# Patient Record
Sex: MALE | Race: WHITE | NOT HISPANIC OR LATINO | Employment: UNEMPLOYED | URBAN - METROPOLITAN AREA
[De-identification: names, ages, dates, MRNs, and addresses within clinical notes are randomized per-mention and may not be internally consistent; named-entity substitution may affect disease eponyms.]

---

## 2017-08-02 ENCOUNTER — OFFICE VISIT (OUTPATIENT)
Dept: RETAIL CLINIC | Facility: CLINIC | Age: 27
End: 2017-08-02

## 2017-08-02 DIAGNOSIS — Z02.83 ENCOUNTER FOR DRUG SCREENING: Primary | ICD-10-CM

## 2021-07-21 ENCOUNTER — HOSPITAL ENCOUNTER (EMERGENCY)
Facility: HOSPITAL | Age: 31
Discharge: HOME | End: 2021-07-21
Attending: EMERGENCY MEDICINE | Admitting: EMERGENCY MEDICINE

## 2021-07-21 ENCOUNTER — APPOINTMENT (OUTPATIENT)
Dept: GENERAL RADIOLOGY | Facility: HOSPITAL | Age: 31
End: 2021-07-21

## 2021-07-21 VITALS
TEMPERATURE: 98.3 F | OXYGEN SATURATION: 95 % | DIASTOLIC BLOOD PRESSURE: 97 MMHG | BODY MASS INDEX: 39.17 KG/M2 | HEIGHT: 75 IN | WEIGHT: 315 LBS | SYSTOLIC BLOOD PRESSURE: 143 MMHG | HEART RATE: 70 BPM | RESPIRATION RATE: 18 BRPM

## 2021-07-21 DIAGNOSIS — R07.9 CHEST PAIN, UNSPECIFIED TYPE: Primary | ICD-10-CM

## 2021-07-21 LAB
ALBUMIN SERPL-MCNC: 4.4 G/DL (ref 3.5–5.2)
ALBUMIN/GLOB SERPL: 1.7 G/DL
ALP SERPL-CCNC: 78 U/L (ref 39–117)
ALT SERPL W P-5'-P-CCNC: 51 U/L (ref 1–41)
ANION GAP SERPL CALCULATED.3IONS-SCNC: 10.7 MMOL/L (ref 5–15)
AST SERPL-CCNC: 25 U/L (ref 1–40)
BASOPHILS # BLD AUTO: 0.05 10*3/MM3 (ref 0–0.2)
BASOPHILS NFR BLD AUTO: 0.6 % (ref 0–1.5)
BILIRUB SERPL-MCNC: 0.3 MG/DL (ref 0–1.2)
BUN SERPL-MCNC: 9 MG/DL (ref 6–20)
BUN/CREAT SERPL: 11.8 (ref 7–25)
CALCIUM SPEC-SCNC: 9.7 MG/DL (ref 8.6–10.5)
CHLORIDE SERPL-SCNC: 103 MMOL/L (ref 98–107)
CO2 SERPL-SCNC: 26.3 MMOL/L (ref 22–29)
CREAT SERPL-MCNC: 0.76 MG/DL (ref 0.76–1.27)
DEPRECATED RDW RBC AUTO: 38.1 FL (ref 37–54)
EOSINOPHIL # BLD AUTO: 0.19 10*3/MM3 (ref 0–0.4)
EOSINOPHIL NFR BLD AUTO: 2.3 % (ref 0.3–6.2)
ERYTHROCYTE [DISTWIDTH] IN BLOOD BY AUTOMATED COUNT: 11.9 % (ref 12.3–15.4)
GFR SERPL CREATININE-BSD FRML MDRD: 120 ML/MIN/1.73
GLOBULIN UR ELPH-MCNC: 2.6 GM/DL
GLUCOSE SERPL-MCNC: 87 MG/DL (ref 65–99)
HCT VFR BLD AUTO: 50.7 % (ref 37.5–51)
HGB BLD-MCNC: 17.5 G/DL (ref 13–17.7)
HOLD SPECIMEN: NORMAL
HOLD SPECIMEN: NORMAL
IMM GRANULOCYTES # BLD AUTO: 0.02 10*3/MM3 (ref 0–0.05)
IMM GRANULOCYTES NFR BLD AUTO: 0.2 % (ref 0–0.5)
LYMPHOCYTES # BLD AUTO: 1.76 10*3/MM3 (ref 0.7–3.1)
LYMPHOCYTES NFR BLD AUTO: 21.5 % (ref 19.6–45.3)
MCH RBC QN AUTO: 30 PG (ref 26.6–33)
MCHC RBC AUTO-ENTMCNC: 34.5 G/DL (ref 31.5–35.7)
MCV RBC AUTO: 87 FL (ref 79–97)
MONOCYTES # BLD AUTO: 0.63 10*3/MM3 (ref 0.1–0.9)
MONOCYTES NFR BLD AUTO: 7.7 % (ref 5–12)
NEUTROPHILS NFR BLD AUTO: 5.54 10*3/MM3 (ref 1.7–7)
NEUTROPHILS NFR BLD AUTO: 67.7 % (ref 42.7–76)
NRBC BLD AUTO-RTO: 0 /100 WBC (ref 0–0.2)
PLATELET # BLD AUTO: 181 10*3/MM3 (ref 140–450)
PMV BLD AUTO: 10.6 FL (ref 6–12)
POTASSIUM SERPL-SCNC: 3.9 MMOL/L (ref 3.5–5.2)
PROT SERPL-MCNC: 7 G/DL (ref 6–8.5)
RBC # BLD AUTO: 5.83 10*6/MM3 (ref 4.14–5.8)
SODIUM SERPL-SCNC: 140 MMOL/L (ref 136–145)
TROPONIN T SERPL-MCNC: <0.01 NG/ML (ref 0–0.03)
WBC # BLD AUTO: 8.19 10*3/MM3 (ref 3.4–10.8)
WHOLE BLOOD HOLD SPECIMEN: NORMAL

## 2021-07-21 PROCEDURE — 99283 EMERGENCY DEPT VISIT LOW MDM: CPT

## 2021-07-21 PROCEDURE — 85025 COMPLETE CBC W/AUTO DIFF WBC: CPT | Performed by: EMERGENCY MEDICINE

## 2021-07-21 PROCEDURE — 93005 ELECTROCARDIOGRAM TRACING: CPT | Performed by: EMERGENCY MEDICINE

## 2021-07-21 PROCEDURE — 71045 X-RAY EXAM CHEST 1 VIEW: CPT

## 2021-07-21 PROCEDURE — 84484 ASSAY OF TROPONIN QUANT: CPT | Performed by: EMERGENCY MEDICINE

## 2021-07-21 PROCEDURE — 80053 COMPREHEN METABOLIC PANEL: CPT | Performed by: EMERGENCY MEDICINE

## 2021-07-21 RX ORDER — ASPIRIN 81 MG/1
81 TABLET ORAL DAILY
COMMUNITY

## 2021-07-21 RX ORDER — SODIUM CHLORIDE 0.9 % (FLUSH) 0.9 %
10 SYRINGE (ML) INJECTION AS NEEDED
Status: DISCONTINUED | OUTPATIENT
Start: 2021-07-21 | End: 2021-07-21 | Stop reason: HOSPADM

## 2021-07-21 RX ORDER — LISINOPRIL 5 MG/1
TABLET ORAL
COMMUNITY
End: 2021-12-13 | Stop reason: DRUGHIGH

## 2021-07-21 RX ORDER — SERTRALINE HYDROCHLORIDE 25 MG/1
TABLET, FILM COATED ORAL
COMMUNITY
End: 2021-08-13

## 2021-07-21 RX ORDER — ALBUTEROL SULFATE 90 UG/1
AEROSOL, METERED RESPIRATORY (INHALATION) EVERY 4 HOURS
COMMUNITY

## 2021-07-21 NOTE — ED PROVIDER NOTES
Subjective   31-year-old male presents to the ED with a chief complaint of chest pain.  Patient states that he has had 3 episodes of chest pain today.  One episode around 9 PM, one episode around 9:30 PM and one episode around 10:30 PM.  Dull ache throughout his entire chest.  The episodes last about 10 minutes.  The episodes are not associated with any other symptoms.  No shortness of breath cough or wheeze.  No nausea vomiting diarrhea or abdominal pain.  No lightheadedness or dizziness.  No diaphoresis.  No known history of coronary artery disease.  No other complaints at this time.          Review of Systems   Cardiovascular: Positive for chest pain.   All other systems reviewed and are negative.      Past Medical History:   Diagnosis Date   • Depression    • Hypertension        No Known Allergies    Past Surgical History:   Procedure Laterality Date   • DENTAL PROCEDURE     • TONSILLECTOMY         History reviewed. No pertinent family history.    Social History     Socioeconomic History   • Marital status: Unknown     Spouse name: Not on file   • Number of children: Not on file   • Years of education: Not on file   • Highest education level: Not on file   Tobacco Use   • Smoking status: Current Every Day Smoker     Packs/day: 1.00     Types: Cigarettes   • Smokeless tobacco: Never Used   Substance and Sexual Activity   • Alcohol use: Not Currently   • Drug use: Never   • Sexual activity: Defer           Objective   Physical Exam  Vitals and nursing note reviewed.   Constitutional:       General: He is not in acute distress.     Appearance: He is well-developed. He is not diaphoretic.   HENT:      Head: Normocephalic and atraumatic.      Nose: Nose normal.   Eyes:      Conjunctiva/sclera: Conjunctivae normal.      Pupils: Pupils are equal, round, and reactive to light.   Cardiovascular:      Rate and Rhythm: Normal rate and regular rhythm.   Pulmonary:      Effort: Pulmonary effort is normal. No respiratory  distress.      Breath sounds: Normal breath sounds.   Abdominal:      General: There is no distension.      Palpations: Abdomen is soft.      Tenderness: There is no abdominal tenderness.   Musculoskeletal:         General: No deformity.   Neurological:      Mental Status: He is alert and oriented to person, place, and time.      Cranial Nerves: No cranial nerve deficit.      Coordination: Coordination normal.         Procedures           ED Course  ED Course as of Jul 21 0112 Wed Jul 21, 2021   0038 EKG interpreted by me.  Sinus rhythm.  Rate of 78.  No ST segment or T wave changes.  Normal EKG    [CG]      ED Course User Index  [CG] Trevor Pitts,                                            [facility]  EKG nonischemic.  Chest x-ray negative for acute process.  Heart score of 2.  Patient is appropriate for discharge follow-up outpatient as needed.  He is agreeable to this plan.      Final diagnoses:   Chest pain, unspecified type       ED Disposition  ED Disposition     ED Disposition Condition Comment    Discharge Stable           Blake Candelaria MD  [address] [address] [phone]               Medication List      No changes were made to your prescriptions during this visit.          Trevor Pitts DO  07/21/21 0113

## 2021-08-06 NOTE — PROGRESS NOTES
Patient: Ozzie Le    YOB: 1990    Date: 08/13/2021    Primary Care Provider: Hussein Pierce APRN    Chief Complaint   Patient presents with   • Hernia       SUBJECTIVE:    History of present illness:  I saw the patient in the office today as a consultation for evaluation and treatment of periumbilical abdominal pain. Recent abdominal CT shows small umbilical hernia.  Hernia is asymptomatic and increasing in size.  Patient has pain when he is touched or have prolonged lifting or standing.  No change in bowel habits.    The following portions of the patient's history were reviewed and updated as appropriate: allergies, current medications, past family history, past medical history, past social history, past surgical history and problem list.    Review of Systems   Constitutional: Negative for chills, fever and unexpected weight change.   HENT: Negative for trouble swallowing and voice change.    Eyes: Negative for visual disturbance.   Respiratory: Negative for apnea, cough, chest tightness, shortness of breath and wheezing.    Cardiovascular: Negative for chest pain, palpitations and leg swelling.   Gastrointestinal: Positive for abdominal pain. Negative for abdominal distention, anal bleeding, blood in stool, constipation, diarrhea, nausea, rectal pain and vomiting.   Endocrine: Negative for cold intolerance and heat intolerance.   Genitourinary: Negative for difficulty urinating, dysuria, flank pain, scrotal swelling and testicular pain.   Musculoskeletal: Negative for back pain, gait problem and joint swelling.   Skin: Negative for color change, rash and wound.   Neurological: Negative for dizziness, syncope, speech difficulty, weakness, numbness and headaches.   Hematological: Negative for adenopathy. Does not bruise/bleed easily.   Psychiatric/Behavioral: Negative for confusion. The patient is not nervous/anxious.        History:  Past Medical History:   Diagnosis Date   • Depression   "  • Hypertension        Past Surgical History:   Procedure Laterality Date   • DENTAL PROCEDURE     • TONSILLECTOMY         No family history on file.    Social History     Tobacco Use   • Smoking status: Current Every Day Smoker     Packs/day: 1.00     Types: Cigarettes   • Smokeless tobacco: Never Used   Substance Use Topics   • Alcohol use: Not Currently   • Drug use: Never       Allergies:  No Known Allergies    Medications:    Current Outpatient Medications:   •  albuterol sulfate HFA (ProAir HFA) 108 (90 Base) MCG/ACT inhaler, Every 4 (Four) Hours., Disp: , Rfl:   •  aspirin 81 MG EC tablet, Take 81 mg by mouth Daily., Disp: , Rfl:   •  buPROPion XL (WELLBUTRIN XL) 150 MG 24 hr tablet, , Disp: , Rfl:   •  lisinopril (PRINIVIL,ZESTRIL) 5 MG tablet, lisinopril 5 mg tablet  Take 1 tablet every day by oral route., Disp: , Rfl:     OBJECTIVE:    Vital Signs:   Vitals:    08/13/21 1356   BP: 140/74   Pulse: 95   Temp: 97.3 °F (36.3 °C)   SpO2: 96%   Weight: (!) 149 kg (328 lb)   Height: 190.5 cm (75\")       Physical Exam:   General Appearance:    Alert, cooperative, in no acute distress   Head:    Normocephalic, without obvious abnormality, atraumatic   Eyes:            Lids and lashes normal, conjunctivae and sclerae normal, no   icterus, no pallor, corneas clear, PERRLA   Ears:    Ears appear intact with no abnormalities noted   Throat:   No oral lesions, no thrush, oral mucosa moist   Neck:   No adenopathy, supple, trachea midline, no thyromegaly, no   carotid bruit, no JVD   Lungs:     Clear to auscultation,respirations regular, even and                  unlabored    Heart:    Regular rhythm and normal rate, normal S1 and S2, no            murmur   Abdomen:     no masses, no organomegaly, soft non-tender, non-distended, no guarding, there is evidence of a 4 cm umbilical hernia that is nonreducible.  Tender to palpation.   Extremities:   Moves all extremities well, no edema, no cyanosis, no             redness "   Pulses:   Pulses palpable and equal bilaterally   Skin:   No bleeding, bruising or rash   Lymph nodes:   No palpable adenopathy   Neurologic:   Cranial nerves 2 - 12 grossly intact, sensation intact        Results Review:   I reviewed the patient's new clinical results.    Review of Systems was reviewed and confirmed as accurate as documented by the MA.    ASSESSMENT/PLAN:    1. Umbilical hernia without obstruction and without gangrene        I had a detailed and extensive discussion with the patient in the office and they understand that they need to undergo incarcerated umbilical hernia repair with mesh.  Full risks and benefits of operative versus nonoperative intervention were discussed with the patient and these included things such as nonresolution of symptoms and possible worsening of symptoms without surgical intervention versus infection, bleeding, possible recurrent hernia, possible postoperative neuralgia from nerve damage or involvement with scar tissue, etc.  The patient understands, agrees, and had no questions for me at the end of the office visit.     I discussed the patients findings and my recommendations with patient.    Electronically signed by Jackelyn Albert MD  08/13/21

## 2021-08-13 ENCOUNTER — OFFICE VISIT (OUTPATIENT)
Dept: SURGERY | Facility: CLINIC | Age: 31
End: 2021-08-13

## 2021-08-13 VITALS
HEIGHT: 75 IN | HEART RATE: 95 BPM | SYSTOLIC BLOOD PRESSURE: 140 MMHG | OXYGEN SATURATION: 96 % | TEMPERATURE: 97.3 F | WEIGHT: 315 LBS | DIASTOLIC BLOOD PRESSURE: 74 MMHG | BODY MASS INDEX: 39.17 KG/M2

## 2021-08-13 DIAGNOSIS — Z01.818 PREOP TESTING: Primary | ICD-10-CM

## 2021-08-13 DIAGNOSIS — K42.9 UMBILICAL HERNIA WITHOUT OBSTRUCTION AND WITHOUT GANGRENE: Primary | ICD-10-CM

## 2021-08-13 PROCEDURE — 99203 OFFICE O/P NEW LOW 30 MIN: CPT | Performed by: SURGERY

## 2021-08-13 RX ORDER — BUPROPION HYDROCHLORIDE 150 MG/1
TABLET ORAL
COMMUNITY
Start: 2021-07-28 | End: 2021-12-13 | Stop reason: DRUGHIGH

## 2021-08-23 ENCOUNTER — LAB (OUTPATIENT)
Dept: LAB | Facility: HOSPITAL | Age: 31
End: 2021-08-23

## 2021-08-23 DIAGNOSIS — Z01.818 PREOP TESTING: ICD-10-CM

## 2021-08-23 DIAGNOSIS — Z01.818 PREOP TESTING: Primary | ICD-10-CM

## 2021-08-23 LAB — SARS-COV-2 RNA PNL SPEC NAA+PROBE: NOT DETECTED

## 2021-08-23 PROCEDURE — C9803 HOPD COVID-19 SPEC COLLECT: HCPCS

## 2021-08-23 PROCEDURE — 87635 SARS-COV-2 COVID-19 AMP PRB: CPT

## 2021-08-24 ENCOUNTER — OUTSIDE FACILITY SERVICE (OUTPATIENT)
Dept: SURGERY | Facility: CLINIC | Age: 31
End: 2021-08-24

## 2021-08-24 DIAGNOSIS — K42.9 UMBILICAL HERNIA WITHOUT OBSTRUCTION AND WITHOUT GANGRENE: Primary | ICD-10-CM

## 2021-08-24 PROCEDURE — 49587 PR REPAIR UMBILICAL HERN,5+Y/O,STRANG: CPT | Performed by: SURGERY

## 2021-08-24 RX ORDER — HYDROCODONE BITARTRATE AND ACETAMINOPHEN 7.5; 325 MG/1; MG/1
1 TABLET ORAL EVERY 6 HOURS PRN
Qty: 14 TABLET | Refills: 0 | Status: SHIPPED | OUTPATIENT
Start: 2021-08-24 | End: 2021-11-03

## 2021-09-03 NOTE — PROGRESS NOTES
"Patient: Ozzie Le    YOB: 1990    Date: 09/10/2021    Primary Care Provider: Hussein Pierce APRN    Chief Complaint   Patient presents with   • Post-op Follow-up     umbilical hernia repair.       History of present illness:  I saw the patient in the office today as a followup from their recent hernia repair.  They state that they have done well and are having no complaints.    Vital Signs:   Vitals:    09/10/21 1418   BP: 142/74   Pulse: 96   Resp: 18   Temp: 97.6 °F (36.4 °C)   TempSrc: Temporal   SpO2: 97%   Weight: (!) 149 kg (328 lb)   Height: 190.5 cm (75\")       Physical Exam:   General Appearance:    Alert, cooperative, in no acute distress   Abdomen:     no masses, no organomegaly, soft non-tender, non-distended, no guarding, wounds are well healed, no evidence of recurrent hernia     Assessment / Plan:    1. Postoperative visit        I did discuss the situation with the patient today in the office and they have done well from their recent herniorraphy.  I have released the patient back to normal activity, they understand that they need to be careful about heavy lifting.  I need to see the patient back in the office only if they are having further problems, they know to call me if they are.    Electronically signed by Jackelyn Albert MD  09/10/21                "

## 2021-09-10 ENCOUNTER — OFFICE VISIT (OUTPATIENT)
Dept: SURGERY | Facility: CLINIC | Age: 31
End: 2021-09-10

## 2021-09-10 VITALS
OXYGEN SATURATION: 97 % | HEIGHT: 75 IN | WEIGHT: 315 LBS | HEART RATE: 96 BPM | RESPIRATION RATE: 18 BRPM | DIASTOLIC BLOOD PRESSURE: 74 MMHG | BODY MASS INDEX: 39.17 KG/M2 | TEMPERATURE: 97.6 F | SYSTOLIC BLOOD PRESSURE: 142 MMHG

## 2021-09-10 DIAGNOSIS — Z48.89 POSTOPERATIVE VISIT: Primary | ICD-10-CM

## 2021-09-10 PROCEDURE — 99024 POSTOP FOLLOW-UP VISIT: CPT | Performed by: SURGERY

## 2021-11-03 ENCOUNTER — OFFICE VISIT (OUTPATIENT)
Dept: BEHAVIORAL HEALTH | Facility: CLINIC | Age: 31
End: 2021-11-03

## 2021-11-03 VITALS — WEIGHT: 315 LBS | BODY MASS INDEX: 39.17 KG/M2 | HEIGHT: 75 IN

## 2021-11-03 DIAGNOSIS — F33.1 MODERATE EPISODE OF RECURRENT MAJOR DEPRESSIVE DISORDER (HCC): Primary | ICD-10-CM

## 2021-11-03 PROCEDURE — 90791 PSYCH DIAGNOSTIC EVALUATION: CPT | Performed by: COUNSELOR

## 2021-12-13 ENCOUNTER — OFFICE VISIT (OUTPATIENT)
Dept: BEHAVIORAL HEALTH | Facility: CLINIC | Age: 31
End: 2021-12-13

## 2021-12-13 VITALS — WEIGHT: 315 LBS | BODY MASS INDEX: 39.17 KG/M2 | HEIGHT: 75 IN

## 2021-12-13 DIAGNOSIS — F41.1 GAD (GENERALIZED ANXIETY DISORDER): Primary | ICD-10-CM

## 2021-12-13 PROCEDURE — 90792 PSYCH DIAG EVAL W/MED SRVCS: CPT | Performed by: NURSE PRACTITIONER

## 2021-12-13 RX ORDER — HYDROXYZINE PAMOATE 25 MG/1
25 CAPSULE ORAL 3 TIMES DAILY PRN
Qty: 90 CAPSULE | Refills: 2 | Status: SHIPPED | OUTPATIENT
Start: 2021-12-13

## 2021-12-13 RX ORDER — LORATADINE 10 MG/1
TABLET ORAL
COMMUNITY
Start: 2021-10-26

## 2021-12-13 RX ORDER — LISINOPRIL 10 MG/1
TABLET ORAL
COMMUNITY
Start: 2021-10-26

## 2021-12-13 RX ORDER — BUPROPION HYDROCHLORIDE 300 MG/1
TABLET ORAL
COMMUNITY
Start: 2021-10-26

## 2021-12-13 NOTE — PROGRESS NOTES
Patient Name: Ozzie Le  MRN: [id number]   :  1990     Referring Physician: Hussein Pierce APRN    Chief Complaint:     ICD-10-CM ICD-9-CM   1. YOUNG (generalized anxiety disorder)  F41.1 300.02       HPI:   Ozzie Le is a 31 y.o. male who is here today for initial evaluation of Anxiety . Pt going through a divorce. Having increased anxiety. Has one biological child and one step child. 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 When anxious pt has chest pain and some shortness of breath. Has difficulty falling asleep and staying asleep. Pt states they are trying to reconcile.     Past Medical History:   Past Medical History:   Diagnosis Date   • Depression    • Hypertension        Past Surgical History:   Past Surgical History:   Procedure Laterality Date   • DENTAL PROCEDURE     • TONSILLECTOMY         Social History:   Social History     Socioeconomic History   • Marital status: Legally    Tobacco Use   • Smoking status: Current Every Day Smoker     Packs/day: 1.00     Types: Cigarettes   • Smokeless tobacco: Never Used   Vaping Use   • Vaping Use: Never used   Substance and Sexual Activity   • Alcohol use: Not Currently   • Drug use: Never   • Sexual activity: Defer       Family History:  No family history on file.    Allergy:  No Known Allergies    Current Medications:   Current Outpatient Medications   Medication Sig Dispense Refill   • albuterol sulfate HFA (ProAir HFA) 108 (90 Base) MCG/ACT inhaler Every 4 (Four) Hours.     • aspirin 81 MG EC tablet Take 81 mg by mouth Daily.     • buPROPion XL (WELLBUTRIN XL) 300 MG 24 hr tablet      • lisinopril (PRINIVIL,ZESTRIL) 10 MG tablet      • loratadine (CLARITIN) 10 MG tablet      • hydrOXYzine pamoate (VISTARIL) 25 MG capsule Take 1 capsule by mouth 3 (Three) Times a Day As Needed for Anxiety. 90 capsule 2     No current facility-administered medications for this visit.       Lab Results:   No visits with  results within 3 Month(s) from this visit.   Latest known visit with results is:   Lab on 08/23/2021   Component Date Value Ref Range Status   • COVID19 08/23/2021 Not Detected  Not Detected - Ref. Range Final       Review of Symptoms:   Review of Systems   Constitutional: Negative for activity change, appetite change, fatigue, unexpected weight gain and unexpected weight loss.   Respiratory: Negative for shortness of breath and wheezing.    Gastrointestinal: Negative for constipation, diarrhea, nausea and vomiting.   Musculoskeletal: Negative for gait problem.   Skin: Negative for dry skin and rash.   Neurological: Negative for dizziness, speech difficulty, weakness, light-headedness, headache, memory problem and confusion.   Psychiatric/Behavioral: Positive for depressed mood. Negative for agitation, behavioral problems, decreased concentration, dysphoric mood, hallucinations, self-injury, sleep disturbance, suicidal ideas, negative for hyperactivity and stress. The patient is nervous/anxious.        Physical Exam:   Physical Exam  Vitals and nursing note reviewed.   Constitutional:       General: He is not in acute distress.     Appearance: He is well-developed. He is not diaphoretic.   HENT:      Head: Normocephalic and atraumatic.   Eyes:      Conjunctiva/sclera: Conjunctivae normal.   Cardiovascular:      Rate and Rhythm: Normal rate.   Pulmonary:      Effort: Pulmonary effort is normal. No respiratory distress.   Musculoskeletal:         General: Normal range of motion.      Cervical back: Full passive range of motion without pain and normal range of motion.   Skin:     General: Skin is warm and dry.   Neurological:      Mental Status: He is alert and oriented to person, place, and time.   Psychiatric:         Mood and Affect: Mood is anxious. Mood is not depressed. Affect is not labile, blunt, angry or inappropriate.         Speech: Speech is not rapid and pressured or tangential.         Behavior: Behavior  "normal. Behavior is not agitated, slowed, aggressive, withdrawn, hyperactive or combative. Behavior is cooperative.         Thought Content: Thought content normal. Thought content is not paranoid or delusional. Thought content does not include homicidal or suicidal ideation. Thought content does not include homicidal or suicidal plan.         Judgment: Judgment normal.       Height 190.5 cm (75\"), weight (!) 143 kg (315 lb).  Body mass index is 39.37 kg/m².     Mental Status Exam:   Appearance: appropriate  Hygiene:   good  Cooperation:  Cooperative  Eye Contact:  Good  Psychomotor Behavior:  Restless  Mood:anxious  Affect:  Appropriate  Hopelessness: Denies  Speech:  Normal  Thought Process:  Goal directed  Thought Content:  Normal  Suicidal:  None  Homicidal:  None  Hallucinations:  None  Delusion:  None  Memory:  Intact  Orientation:  Person, Place, Time and Situation  Reliability:  good  Insight:  Good  Judgement:  Good  Impulse Control:  Good  Physical/Medical Issues:  No     PHQ-9 Depression Screening  Little interest or pleasure in doing things? 1   Feeling down, depressed, or hopeless? 1   Trouble falling or staying asleep, or sleeping too much? 3 (Falling and staying asleep)   Feeling tired or having little energy? 1   Poor appetite or overeating? 2 (Poor appetite)   Feeling bad about yourself - or that you are a failure or have let yourself or your family down? 2   Trouble concentrating on things, such as reading the newspaper or watching television? 2   Moving or speaking so slowly that other people could have noticed? Or the opposite - being so fidgety or restless that you have been moving around a lot more than usual? 0   Thoughts that you would be better off dead, or of hurting yourself in some way? 0   PHQ-9 Total Score 12   If you checked off any problems, how difficult have these problems made it for you to do your work, take care of things at home, or get along with other people? Not difficult at " all      Assessment/Plan:   Diagnoses and all orders for this visit:    1. YOUNG (generalized anxiety disorder) (Primary)  -     hydrOXYzine pamoate (VISTARIL) 25 MG capsule; Take 1 capsule by mouth 3 (Three) Times a Day As Needed for Anxiety.  Dispense: 90 capsule; Refill: 2    Will add hydroxyzine prn for anxiety.     A psychological evaluation was conducted in order to assess past and current level of functioning. Areas assessed included, but were not limited to: perception of social support, perception of ability to face and deal with challenges in life (positive functioning), anxiety symptoms, depressive symptoms, perspective on beliefs/belief system, coping skills for stress, intelligence level,  Therapeutic rapport was established. Interventions conducted today were geared towards incorporating medication management along with support for continued therapy. Education was also provided as to the med management with this provider and what to expect in subsequent sessions.    We discussed risks, benefits,goals and side effects of the above medication and the patient was agreeable with the plan.Patient was educated on the importance of compliance with treatment and follow-up appointments. Patient is aware to contact the [facility] with any worsening of symptoms. To call for questions or concerns and return early if necessary. Patent is agreeable to go to the Emergency Department or call 911 should they begin SI/HI.     Treatment Plan:   Discussed risks, benefits, and alternatives of medication. Encouraged healthy habits (eating, exercise and sleep). Call if any questions or problems arise. Medication reconciled. Controlled substance monitoring report reviewed. Provided psychoeducation.. Discussed coping strategies and current stressors. Set appropriate boundaries and limits for patient's well-being. Use distraction techniques to improve symptoms. Access support networks.      Return in about 4 weeks (around  1/10/2022) for Follow Up 15 min.    Jailyn Pruitt, APRN

## 2022-02-07 ENCOUNTER — OFFICE VISIT (OUTPATIENT)
Dept: BEHAVIORAL HEALTH | Facility: CLINIC | Age: 32
End: 2022-02-07

## 2022-02-07 VITALS — WEIGHT: 315 LBS | BODY MASS INDEX: 39.17 KG/M2 | HEIGHT: 75 IN

## 2022-02-07 DIAGNOSIS — F33.0 MILD EPISODE OF RECURRENT MAJOR DEPRESSIVE DISORDER: Primary | ICD-10-CM

## 2022-02-07 PROCEDURE — 90834 PSYTX W PT 45 MINUTES: CPT | Performed by: COUNSELOR

## 2022-05-25 ENCOUNTER — OFFICE VISIT (OUTPATIENT)
Dept: BEHAVIORAL HEALTH | Facility: CLINIC | Age: 32
End: 2022-05-25

## 2022-05-25 VITALS — RESPIRATION RATE: 16 BRPM | WEIGHT: 315 LBS | BODY MASS INDEX: 39.17 KG/M2 | HEIGHT: 75 IN

## 2022-05-25 DIAGNOSIS — F33.0 MILD EPISODE OF RECURRENT MAJOR DEPRESSIVE DISORDER: Primary | ICD-10-CM

## 2022-05-25 PROCEDURE — 90847 FAMILY PSYTX W/PT 50 MIN: CPT | Performed by: COUNSELOR

## 2022-06-09 NOTE — PROGRESS NOTES
Follow Up Therapy Office Visit      Date: 2022     Patient Name: Ozzie Le  : 1990   Time In: 9:45  Time Out: 10:41    PCP: Hussein Pierce APRN    Chief Complaint:     ICD-10-CM ICD-9-CM   1. Mild episode of recurrent major depressive disorder (HCC)  F33.0 296.31       History of Present Illness: Ozzie Le is a 32 y.o. male who presents today for follow up therapy session. Patient arrived for session on time, clean and casually dressed without evidence of intoxication, withdrawal, or perceptual disturbance. Patient was cooperative and agreeable to treatment and interacted with therapist.  Patient was seen at the [place] office location. The therapist met with the patient and his wife today. 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 The patients wife discussed that he does not give her enough affection and states that he is really cold. The patient also discussed that they cannot communicate with each other, both insinuated that there has been affairs on both parties, but was not discussed further. The therapist also discussed the importance of having the same parenting styles, so while parenting their children, they will be a united front.       Subjective      Review of Systems:   The following portions of the patient's history were reviewed and updated as appropriate: allergies, current medications, past family history, past medical history, past social history, past surgical history and problem list.    Past Medical History:   Past Medical History:   Diagnosis Date   • Depression    • Hypertension        Past Surgical History:   Past Surgical History:   Procedure Laterality Date   • DENTAL PROCEDURE     • TONSILLECTOMY         Family History: History reviewed. No pertinent family history.    Social History:   Social History     Socioeconomic History   • Marital status: Legally  "   Tobacco Use   • Smoking status: Current Every Day Smoker     Packs/day: 1.00     Types: Cigarettes   • Smokeless tobacco: Never Used   Vaping Use   • Vaping Use: Never used   Substance and Sexual Activity   • Alcohol use: Not Currently   • Drug use: Never   • Sexual activity: Defer       Trauma History:  yes    Spiritual:  unknown    Mental Illness and/or Substance Abuse: The patient has been diagnosed with depression.      History: No    Medication:     Current Outpatient Medications:   •  albuterol sulfate  (90 Base) MCG/ACT inhaler, Every 4 (Four) Hours., Disp: , Rfl:   •  aspirin 81 MG EC tablet, Take 81 mg by mouth Daily., Disp: , Rfl:   •  buPROPion XL (WELLBUTRIN XL) 300 MG 24 hr tablet, , Disp: , Rfl:   •  hydrOXYzine pamoate (VISTARIL) 25 MG capsule, Take 1 capsule by mouth 3 (Three) Times a Day As Needed for Anxiety., Disp: 90 capsule, Rfl: 2  •  lisinopril (PRINIVIL,ZESTRIL) 10 MG tablet, , Disp: , Rfl:   •  loratadine (CLARITIN) 10 MG tablet, , Disp: , Rfl:     Allergies:   No Known Allergies    Educational/Work History:  Highest level of education obtained: 12th grade  Employment Status: The patient is currently working construction.     Significant Life Events:   Patient been through or witnessed a divorce? no  None.     Patient experienced a death / loss of relationship? yes  The patients dad  from a heart attack in 2020.    Patient experienced a major accident or tragic events? yes  The patients friend became paralyzed in 2019.    Patient experienced any other significant life events or trauma (such as verbal, physical, sexual abuse)? no  None.    Legal History:  The patient has no significant history of legal issues.  Court-ordered: No    PHQ-9 Score:   PHQ-9 Total Score:       YOUNG 7: Total Score: unknown     Objective     Physical Exam:   Resp. rate 16, height 190.5 cm (75\"), weight (!) 144 kg (318 lb). Body mass index is 39.75 kg/m².     Physical " Exam    Patient's Support Network Includes:  extended family    Prognosis: Good with Ongoing Treatment     Mental Status Exam:   Hygiene:   good  Cooperation:  Cooperative  Eye Contact:  Good  Psychomotor Behavior:  Appropriate  Affect:  Appropriate  Mood: normal  Hopelessness: Denies  Speech:  Normal  Thought Process:  Goal directed  Thought Content:  Normal  Suicidal:  None  Homicidal:  None  Hallucinations:  None  Delusion:  None  Memory:  Intact  Orientation:  Person , place, time, and situation  Reliability:  good  Insight:  Good  Judgement:  Good  Impulse Control:  Good  Physical/Medical Issues:  No      Assessment / Plan      Assessment/Plan:   Diagnoses and all orders for this visit:    1. Mild episode of recurrent major depressive disorder (HCC) (Primary)         The therapist will continue to promote the therapeutic alliance, address the patients issues and concerns, strengthen her self awareness, insights, and positive coping skills. Continue to work with the patient on communication exercises, that will allow the patient and his wife to be more together on issues, and learn to work together.   TREATMENT PLAN/GOALS: Continue supportive psychotherapy efforts and medications as indicated. Treatment and medication options discussed during today's visit. Patient ackowledged and verbally consented to continue with current treatment plan and was educated on the importance of compliance with treatment and follow-up appointments.     Counseled patient regarding multimodal approach with healthy nutrition, healthy sleep, regular physical activity, social activities, counseling, and medications.      Coping skills reviewed and encouraged positive framing of thoughts. No suicidal ideation or homicidal ideation at this time.      Assisted patient in processing above session content; acknowledged and normalized patient’s thoughts, feelings, and concerns.  Applied  positive coping skills and behavior management in  session.    Allowed patient to freely discuss issues without interruption or judgment. Provided safe, confidential environment to facilitate the development of positive therapeutic relationship and encourage open, honest communication. Assisted patient in identifying risk factors which would indicate the need for higher level of care including thoughts to harm self or others and/or self-harming behavior and encouraged patient to contact this office, call 911, or present to the nearest emergency room should any of these events occur. Discussed crisis intervention services and means to access.     Follow Up:   Return in about 4 weeks (around 6/22/2022) for Recheck.    JOANIE Santos  This document has been electronically signed by JOANIE Santos  June 9, 2022 13:34 EDT    Please note that portions of this note were completed with a voice recognition program. Efforts were made to edit dictation, but occasionally words are mistranscribed.

## 2023-09-20 ENCOUNTER — OFFICE VISIT (OUTPATIENT)
Dept: BEHAVIORAL HEALTH | Facility: CLINIC | Age: 33
End: 2023-09-20
Payer: MEDICAID

## 2023-09-20 DIAGNOSIS — F33.0 MILD EPISODE OF RECURRENT MAJOR DEPRESSIVE DISORDER: Primary | ICD-10-CM

## 2023-09-20 DIAGNOSIS — F43.9 TRAUMA AND STRESSOR-RELATED DISORDER: ICD-10-CM

## 2023-09-21 NOTE — PROGRESS NOTES
Follow Up Therapy Office Visit      Date: 2023     Patient Name: Ozzie Le  : 1990   Time In: 1:05  Time Out: 1:40    PCP: Hussein Pierce APRN    Chief Complaint:     ICD-10-CM ICD-9-CM   1. Mild episode of recurrent major depressive disorder  F33.0 296.31   2. Trauma and stressor-related disorder  F43.9 309.81     308.9       History of Present Illness: Ozzie Le is a 33 y.o. male who presents today for follow up therapy session. Patient arrived for session on time, clean and casually dressed without evidence of intoxication, withdrawal, or perceptual disturbance. Patient was cooperative and agreeable to treatment and interacted with therapist.  Patient was seen at the [place] office location.  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  The patient discussed that he continues to have difficulty with his wife.  Patient's wife continues to accuse him of having affairs being with other people, which causes the patient to have a lot of anxiety and anger.  This anxiety and anger causes him to have chest pains, but he does not have suicidal thoughts.  The patient does have suicidal thoughts in regards to his father's death but mainly is because of his mom, and how she treats him.  The patient has a therapist that in the future some marital counseling could be scheduled.  The patient will see about getting it scheduled.  In regards to arguing that him and his wife got in in July in which they both went to prison, the patient states that he has to go to court on  in regards to this.    Subjective      Review of Systems:   The following portions of the patient's history were reviewed and updated as appropriate: allergies, current medications, past family history, past medical history, past social history, past surgical history and problem  list.    Past Medical History:   Past Medical History:   Diagnosis Date    Depression     Hypertension        Past Surgical History:   Past Surgical History:   Procedure Laterality Date    DENTAL PROCEDURE      TONSILLECTOMY         Family History: No family history on file.    Social History:   Social History     Socioeconomic History    Marital status: Legally    Tobacco Use    Smoking status: Every Day     Packs/day: 1.00     Types: Cigarettes    Smokeless tobacco: Never   Vaping Use    Vaping Use: Never used   Substance and Sexual Activity    Alcohol use: Not Currently    Drug use: Never    Sexual activity: Defer       Trauma History:  yes    Spiritual:  unknown    Mental Illness and/or Substance Abuse: The patient has been diagnosed with depression.      History: No    Medication:     Current Outpatient Medications:     albuterol sulfate  (90 Base) MCG/ACT inhaler, Every 4 (Four) Hours., Disp: , Rfl:     aspirin 81 MG EC tablet, Take 81 mg by mouth Daily., Disp: , Rfl:     buPROPion XL (WELLBUTRIN XL) 300 MG 24 hr tablet, , Disp: , Rfl:     hydrOXYzine pamoate (VISTARIL) 25 MG capsule, Take 1 capsule by mouth 3 (Three) Times a Day As Needed for Anxiety., Disp: 90 capsule, Rfl: 2    lisinopril (PRINIVIL,ZESTRIL) 10 MG tablet, , Disp: , Rfl:     loratadine (CLARITIN) 10 MG tablet, , Disp: , Rfl:     Allergies:   No Known Allergies    Educational/Work History:  Highest level of education obtained: 12th grade  Employment Status: The patient is currently is unemployed.    Significant Life Events:   Patient been through or witnessed a divorce? no  None.     Patient experienced a death / loss of relationship? yes  The patients dad  from a heart attack in 2020.    Patient experienced a major accident or tragic events? yes  The patients friend became paralyzed in 2019.    Patient experienced any other significant life events or trauma (such as verbal, physical, sexual  abuse)? no  None.    Legal History:  The patient has no significant history of legal issues.  Court-ordered: No    PHQ-9 Score:   PHQ-9 Total Score: 1    YOUNG 7: Total Score: 2    Objective     Physical Exam:   There were no vitals taken for this visit. There is no height or weight on file to calculate BMI.     Physical Exam    Patient's Support Network Includes:  extended family    Prognosis: Good with Ongoing Treatment     Mental Status Exam:   Hygiene:   good  Cooperation:  Cooperative  Eye Contact:  Good  Psychomotor Behavior:  Appropriate  Affect:  Appropriate  Mood: normal  Hopelessness: Denies  Speech:  Normal  Thought Process:  Goal directed  Thought Content:  Normal  Suicidal:  None  Homicidal:  None  Hallucinations:  None  Delusion:  None  Memory:  Intact  Orientation:  Person , place, time, and situation  Reliability:  good  Insight:  Good  Judgement:  Good  Impulse Control:  Good  Physical/Medical Issues:  No    Nothing has changed.   Assessment / Plan      Assessment/Plan:   Diagnoses and all orders for this visit:    1. Mild episode of recurrent major depressive disorder (Primary)    2. Trauma and stressor-related disorder         The therapist will continue to promote the therapeutic alliance, address the patients issues and concerns, strengthen her self awareness, insights, and positive coping skills. Continue to work with the patient on ways to build to his marital relationship.  The therapist encouraged the patient to be aware of any triggers that he might have due to his anxiety.  TREATMENT PLAN/GOALS: Continue supportive psychotherapy efforts and medications as indicated. Treatment and medication options discussed during today's visit. Patient ackowledged and verbally consented to continue with current treatment plan and was educated on the importance of compliance with treatment and follow-up appointments.     Counseled patient regarding multimodal approach with healthy nutrition, healthy sleep,  regular physical activity, social activities, counseling, and medications.      Coping skills reviewed and encouraged positive framing of thoughts. No suicidal ideation or homicidal ideation at this time.      Assisted patient in processing above session content; acknowledged and normalized patient’s thoughts, feelings, and concerns.  Applied  positive coping skills and behavior management in session.    Allowed patient to freely discuss issues without interruption or judgment. Provided safe, confidential environment to facilitate the development of positive therapeutic relationship and encourage open, honest communication. Assisted patient in identifying risk factors which would indicate the need for higher level of care including thoughts to harm self or others and/or self-harming behavior and encouraged patient to contact this office, call 911, or present to the nearest emergency room should any of these events occur. Discussed crisis intervention services and means to access.     Follow Up:   No follow-ups on file.    JOANIE Santos  This document has been electronically signed by JOANIE Santos  September 21, 2023 13:23 EDT    Please note that portions of this note were completed with a voice recognition program. Efforts were made to edit dictation, but occasionally words are mistranscribed.

## 2023-10-30 ENCOUNTER — APPOINTMENT (OUTPATIENT)
Dept: CT IMAGING | Facility: HOSPITAL | Age: 33
End: 2023-10-30
Payer: MEDICAID

## 2023-10-30 ENCOUNTER — HOSPITAL ENCOUNTER (OUTPATIENT)
Facility: HOSPITAL | Age: 33
Setting detail: OBSERVATION
Discharge: HOME | End: 2023-10-31
Attending: EMERGENCY MEDICINE | Admitting: INTERNAL MEDICINE
Payer: MEDICAID

## 2023-10-30 DIAGNOSIS — K57.20 ABSCESS OF SIGMOID COLON DUE TO DIVERTICULITIS: Primary | ICD-10-CM

## 2023-10-30 DIAGNOSIS — K57.20 PERFORATION OF SIGMOID COLON DUE TO DIVERTICULITIS: ICD-10-CM

## 2023-10-30 LAB
ALBUMIN SERPL-MCNC: 4.1 G/DL (ref 3.5–5.2)
ALBUMIN/GLOB SERPL: 1.2 G/DL
ALP SERPL-CCNC: 80 U/L (ref 39–117)
ALT SERPL W P-5'-P-CCNC: 19 U/L (ref 1–41)
ANION GAP SERPL CALCULATED.3IONS-SCNC: 7.5 MMOL/L (ref 5–15)
AST SERPL-CCNC: 16 U/L (ref 1–40)
BASOPHILS # BLD AUTO: 0.08 10*3/MM3 (ref 0–0.2)
BASOPHILS NFR BLD AUTO: 0.5 % (ref 0–1.5)
BILIRUB SERPL-MCNC: <0.2 MG/DL (ref 0–1.2)
BILIRUB UR QL STRIP: NEGATIVE
BUN SERPL-MCNC: 11 MG/DL (ref 6–20)
BUN/CREAT SERPL: 11.6 (ref 7–25)
CALCIUM SPEC-SCNC: 9.2 MG/DL (ref 8.6–10.5)
CHLORIDE SERPL-SCNC: 103 MMOL/L (ref 98–107)
CLARITY UR: CLEAR
CO2 SERPL-SCNC: 28.5 MMOL/L (ref 22–29)
COLOR UR: YELLOW
CREAT SERPL-MCNC: 0.95 MG/DL (ref 0.76–1.27)
D-LACTATE SERPL-SCNC: 0.8 MMOL/L (ref 0.5–2)
DEPRECATED RDW RBC AUTO: 37.1 FL (ref 37–54)
EGFRCR SERPLBLD CKD-EPI 2021: 109.1 ML/MIN/1.73
EOSINOPHIL # BLD AUTO: 0.24 10*3/MM3 (ref 0–0.4)
EOSINOPHIL NFR BLD AUTO: 1.5 % (ref 0.3–6.2)
ERYTHROCYTE [DISTWIDTH] IN BLOOD BY AUTOMATED COUNT: 12.1 % (ref 12.3–15.4)
GLOBULIN UR ELPH-MCNC: 3.3 GM/DL
GLUCOSE SERPL-MCNC: 97 MG/DL (ref 65–99)
GLUCOSE UR STRIP-MCNC: NEGATIVE MG/DL
HCT VFR BLD AUTO: 42.3 % (ref 37.5–51)
HGB BLD-MCNC: 14.2 G/DL (ref 13–17.7)
HGB UR QL STRIP.AUTO: NEGATIVE
HOLD SPECIMEN: NORMAL
HOLD SPECIMEN: NORMAL
IMM GRANULOCYTES # BLD AUTO: 0.06 10*3/MM3 (ref 0–0.05)
IMM GRANULOCYTES NFR BLD AUTO: 0.4 % (ref 0–0.5)
KETONES UR QL STRIP: NEGATIVE
LEUKOCYTE ESTERASE UR QL STRIP.AUTO: NEGATIVE
LIPASE SERPL-CCNC: 25 U/L (ref 13–60)
LYMPHOCYTES # BLD AUTO: 2.05 10*3/MM3 (ref 0.7–3.1)
LYMPHOCYTES NFR BLD AUTO: 12.7 % (ref 19.6–45.3)
MCH RBC QN AUTO: 28.1 PG (ref 26.6–33)
MCHC RBC AUTO-ENTMCNC: 33.6 G/DL (ref 31.5–35.7)
MCV RBC AUTO: 83.8 FL (ref 79–97)
MONOCYTES # BLD AUTO: 1.52 10*3/MM3 (ref 0.1–0.9)
MONOCYTES NFR BLD AUTO: 9.4 % (ref 5–12)
NEUTROPHILS NFR BLD AUTO: 12.19 10*3/MM3 (ref 1.7–7)
NEUTROPHILS NFR BLD AUTO: 75.5 % (ref 42.7–76)
NITRITE UR QL STRIP: NEGATIVE
NRBC BLD AUTO-RTO: 0 /100 WBC (ref 0–0.2)
PH UR STRIP.AUTO: 6.5 [PH] (ref 5–8)
PLATELET # BLD AUTO: 264 10*3/MM3 (ref 140–450)
PMV BLD AUTO: 10.1 FL (ref 6–12)
POTASSIUM SERPL-SCNC: 4.1 MMOL/L (ref 3.5–5.2)
PROT SERPL-MCNC: 7.4 G/DL (ref 6–8.5)
PROT UR QL STRIP: NEGATIVE
RBC # BLD AUTO: 5.05 10*6/MM3 (ref 4.14–5.8)
SODIUM SERPL-SCNC: 139 MMOL/L (ref 136–145)
SP GR UR STRIP: 1.03 (ref 1–1.03)
UROBILINOGEN UR QL STRIP: NORMAL
WBC NRBC COR # BLD: 16.14 10*3/MM3 (ref 3.4–10.8)
WHOLE BLOOD HOLD COAG: NORMAL
WHOLE BLOOD HOLD SPECIMEN: NORMAL

## 2023-10-30 PROCEDURE — 96365 THER/PROPH/DIAG IV INF INIT: CPT

## 2023-10-30 PROCEDURE — 99284 EMERGENCY DEPT VISIT MOD MDM: CPT

## 2023-10-30 PROCEDURE — G0378 HOSPITAL OBSERVATION PER HR: HCPCS

## 2023-10-30 PROCEDURE — 96375 TX/PRO/DX INJ NEW DRUG ADDON: CPT

## 2023-10-30 PROCEDURE — 80053 COMPREHEN METABOLIC PANEL: CPT | Performed by: EMERGENCY MEDICINE

## 2023-10-30 PROCEDURE — 83690 ASSAY OF LIPASE: CPT | Performed by: EMERGENCY MEDICINE

## 2023-10-30 PROCEDURE — 36415 COLL VENOUS BLD VENIPUNCTURE: CPT

## 2023-10-30 PROCEDURE — 85025 COMPLETE CBC W/AUTO DIFF WBC: CPT | Performed by: EMERGENCY MEDICINE

## 2023-10-30 PROCEDURE — 81003 URINALYSIS AUTO W/O SCOPE: CPT | Performed by: EMERGENCY MEDICINE

## 2023-10-30 PROCEDURE — 87040 BLOOD CULTURE FOR BACTERIA: CPT | Performed by: EMERGENCY MEDICINE

## 2023-10-30 PROCEDURE — 25010000002 PIPERACILLIN SOD-TAZOBACTAM PER 1 G: Performed by: NURSE PRACTITIONER

## 2023-10-30 PROCEDURE — 74176 CT ABD & PELVIS W/O CONTRAST: CPT

## 2023-10-30 PROCEDURE — 83605 ASSAY OF LACTIC ACID: CPT | Performed by: NURSE PRACTITIONER

## 2023-10-30 RX ORDER — ENOXAPARIN SODIUM 100 MG/ML
40 INJECTION SUBCUTANEOUS DAILY
Status: DISCONTINUED | OUTPATIENT
Start: 2023-10-31 | End: 2023-10-31 | Stop reason: HOSPADM

## 2023-10-30 RX ORDER — NICOTINE 21 MG/24HR
1 PATCH, TRANSDERMAL 24 HOURS TRANSDERMAL
Status: DISCONTINUED | OUTPATIENT
Start: 2023-10-30 | End: 2023-10-31 | Stop reason: HOSPADM

## 2023-10-30 RX ORDER — MORPHINE SULFATE 2 MG/ML
2 INJECTION, SOLUTION INTRAMUSCULAR; INTRAVENOUS EVERY 4 HOURS PRN
Status: DISCONTINUED | OUTPATIENT
Start: 2023-10-30 | End: 2023-10-31 | Stop reason: HOSPADM

## 2023-10-30 RX ORDER — SODIUM CHLORIDE 0.9 % (FLUSH) 0.9 %
10 SYRINGE (ML) INJECTION AS NEEDED
Status: DISCONTINUED | OUTPATIENT
Start: 2023-10-30 | End: 2023-10-31 | Stop reason: HOSPADM

## 2023-10-30 RX ORDER — BISACODYL 10 MG
10 SUPPOSITORY, RECTAL RECTAL DAILY PRN
Status: DISCONTINUED | OUTPATIENT
Start: 2023-10-30 | End: 2023-10-31 | Stop reason: HOSPADM

## 2023-10-30 RX ORDER — SODIUM CHLORIDE 9 MG/ML
40 INJECTION, SOLUTION INTRAVENOUS AS NEEDED
Status: DISCONTINUED | OUTPATIENT
Start: 2023-10-30 | End: 2023-10-31 | Stop reason: HOSPADM

## 2023-10-30 RX ORDER — CHOLECALCIFEROL (VITAMIN D3) 125 MCG
5 CAPSULE ORAL NIGHTLY PRN
Status: DISCONTINUED | OUTPATIENT
Start: 2023-10-30 | End: 2023-10-31 | Stop reason: HOSPADM

## 2023-10-30 RX ORDER — BISACODYL 5 MG/1
5 TABLET, DELAYED RELEASE ORAL DAILY PRN
Status: DISCONTINUED | OUTPATIENT
Start: 2023-10-30 | End: 2023-10-31 | Stop reason: HOSPADM

## 2023-10-30 RX ORDER — ONDANSETRON 2 MG/ML
4 INJECTION INTRAMUSCULAR; INTRAVENOUS EVERY 6 HOURS PRN
Status: DISCONTINUED | OUTPATIENT
Start: 2023-10-30 | End: 2023-10-31 | Stop reason: HOSPADM

## 2023-10-30 RX ORDER — POLYETHYLENE GLYCOL 3350 17 G/17G
17 POWDER, FOR SOLUTION ORAL DAILY PRN
Status: DISCONTINUED | OUTPATIENT
Start: 2023-10-30 | End: 2023-10-31 | Stop reason: HOSPADM

## 2023-10-30 RX ORDER — NALOXONE HCL 0.4 MG/ML
0.4 VIAL (ML) INJECTION
Status: DISCONTINUED | OUTPATIENT
Start: 2023-10-30 | End: 2023-10-31 | Stop reason: HOSPADM

## 2023-10-30 RX ORDER — ACETAMINOPHEN 160 MG/5ML
650 SOLUTION ORAL EVERY 4 HOURS PRN
Status: DISCONTINUED | OUTPATIENT
Start: 2023-10-30 | End: 2023-10-31 | Stop reason: HOSPADM

## 2023-10-30 RX ORDER — ACETAMINOPHEN 325 MG/1
650 TABLET ORAL EVERY 4 HOURS PRN
Status: DISCONTINUED | OUTPATIENT
Start: 2023-10-30 | End: 2023-10-31 | Stop reason: HOSPADM

## 2023-10-30 RX ORDER — SODIUM CHLORIDE 0.9 % (FLUSH) 0.9 %
10 SYRINGE (ML) INJECTION EVERY 12 HOURS SCHEDULED
Status: DISCONTINUED | OUTPATIENT
Start: 2023-10-30 | End: 2023-10-31 | Stop reason: HOSPADM

## 2023-10-30 RX ORDER — AMOXICILLIN 250 MG
2 CAPSULE ORAL 2 TIMES DAILY
Status: DISCONTINUED | OUTPATIENT
Start: 2023-10-30 | End: 2023-10-31 | Stop reason: HOSPADM

## 2023-10-30 RX ORDER — ACETAMINOPHEN 650 MG/1
650 SUPPOSITORY RECTAL EVERY 4 HOURS PRN
Status: DISCONTINUED | OUTPATIENT
Start: 2023-10-30 | End: 2023-10-31 | Stop reason: HOSPADM

## 2023-10-30 RX ADMIN — PIPERACILLIN SODIUM AND TAZOBACTAM SODIUM 3.38 G: 3; .375 INJECTION, SOLUTION INTRAVENOUS at 22:51

## 2023-10-31 ENCOUNTER — APPOINTMENT (OUTPATIENT)
Dept: CT IMAGING | Facility: HOSPITAL | Age: 33
End: 2023-10-31
Payer: MEDICAID

## 2023-10-31 VITALS
HEART RATE: 74 BPM | HEIGHT: 75 IN | OXYGEN SATURATION: 96 % | BODY MASS INDEX: 39.12 KG/M2 | RESPIRATION RATE: 18 BRPM | DIASTOLIC BLOOD PRESSURE: 84 MMHG | SYSTOLIC BLOOD PRESSURE: 136 MMHG | TEMPERATURE: 97.8 F | WEIGHT: 314.6 LBS

## 2023-10-31 LAB
ANION GAP SERPL CALCULATED.3IONS-SCNC: 6.7 MMOL/L (ref 5–15)
BASOPHILS # BLD AUTO: 0.07 10*3/MM3 (ref 0–0.2)
BASOPHILS NFR BLD AUTO: 0.5 % (ref 0–1.5)
BUN SERPL-MCNC: 10 MG/DL (ref 6–20)
BUN/CREAT SERPL: 10.2 (ref 7–25)
CALCIUM SPEC-SCNC: 8.9 MG/DL (ref 8.6–10.5)
CHLORIDE SERPL-SCNC: 104 MMOL/L (ref 98–107)
CO2 SERPL-SCNC: 29.3 MMOL/L (ref 22–29)
CREAT SERPL-MCNC: 0.98 MG/DL (ref 0.76–1.27)
DEPRECATED RDW RBC AUTO: 38 FL (ref 37–54)
EGFRCR SERPLBLD CKD-EPI 2021: 105.1 ML/MIN/1.73
EOSINOPHIL # BLD AUTO: 0.25 10*3/MM3 (ref 0–0.4)
EOSINOPHIL NFR BLD AUTO: 1.8 % (ref 0.3–6.2)
ERYTHROCYTE [DISTWIDTH] IN BLOOD BY AUTOMATED COUNT: 12.1 % (ref 12.3–15.4)
GLUCOSE SERPL-MCNC: 105 MG/DL (ref 65–99)
HCT VFR BLD AUTO: 42 % (ref 37.5–51)
HGB BLD-MCNC: 13.9 G/DL (ref 13–17.7)
IMM GRANULOCYTES # BLD AUTO: 0.05 10*3/MM3 (ref 0–0.05)
IMM GRANULOCYTES NFR BLD AUTO: 0.4 % (ref 0–0.5)
LYMPHOCYTES # BLD AUTO: 2.33 10*3/MM3 (ref 0.7–3.1)
LYMPHOCYTES NFR BLD AUTO: 16.6 % (ref 19.6–45.3)
MCH RBC QN AUTO: 28.5 PG (ref 26.6–33)
MCHC RBC AUTO-ENTMCNC: 33.1 G/DL (ref 31.5–35.7)
MCV RBC AUTO: 86.1 FL (ref 79–97)
MONOCYTES # BLD AUTO: 1.22 10*3/MM3 (ref 0.1–0.9)
MONOCYTES NFR BLD AUTO: 8.7 % (ref 5–12)
NEUTROPHILS NFR BLD AUTO: 10.14 10*3/MM3 (ref 1.7–7)
NEUTROPHILS NFR BLD AUTO: 72 % (ref 42.7–76)
NRBC BLD AUTO-RTO: 0 /100 WBC (ref 0–0.2)
PLATELET # BLD AUTO: 247 10*3/MM3 (ref 140–450)
PMV BLD AUTO: 10.7 FL (ref 6–12)
POTASSIUM SERPL-SCNC: 4 MMOL/L (ref 3.5–5.2)
RBC # BLD AUTO: 4.88 10*6/MM3 (ref 4.14–5.8)
SODIUM SERPL-SCNC: 140 MMOL/L (ref 136–145)
WBC NRBC COR # BLD: 14.06 10*3/MM3 (ref 3.4–10.8)

## 2023-10-31 PROCEDURE — G0378 HOSPITAL OBSERVATION PER HR: HCPCS

## 2023-10-31 PROCEDURE — 25010000002 MORPHINE PER 10 MG: Performed by: INTERNAL MEDICINE

## 2023-10-31 PROCEDURE — 25010000002 PIPERACILLIN SOD-TAZOBACTAM PER 1 G: Performed by: INTERNAL MEDICINE

## 2023-10-31 PROCEDURE — 74176 CT ABD & PELVIS W/O CONTRAST: CPT

## 2023-10-31 PROCEDURE — 85025 COMPLETE CBC W/AUTO DIFF WBC: CPT | Performed by: INTERNAL MEDICINE

## 2023-10-31 PROCEDURE — 80048 BASIC METABOLIC PNL TOTAL CA: CPT | Performed by: INTERNAL MEDICINE

## 2023-10-31 RX ORDER — CEFDINIR 300 MG/1
300 CAPSULE ORAL 2 TIMES DAILY
Qty: 20 CAPSULE | Refills: 0 | Status: SHIPPED | OUTPATIENT
Start: 2023-10-31 | End: 2023-11-10

## 2023-10-31 RX ORDER — METRONIDAZOLE 500 MG/1
500 TABLET ORAL 3 TIMES DAILY
Qty: 30 TABLET | Refills: 0 | Status: SHIPPED | OUTPATIENT
Start: 2023-10-31 | End: 2023-11-10

## 2023-10-31 RX ADMIN — DOCUSATE SODIUM 50MG AND SENNOSIDES 8.6MG 2 TABLET: 8.6; 5 TABLET, FILM COATED ORAL at 00:36

## 2023-10-31 RX ADMIN — PIPERACILLIN SODIUM AND TAZOBACTAM SODIUM 3.38 G: 3; .375 INJECTION, SOLUTION INTRAVENOUS at 05:06

## 2023-10-31 RX ADMIN — PIPERACILLIN SODIUM AND TAZOBACTAM SODIUM 3.38 G: 3; .375 INJECTION, SOLUTION INTRAVENOUS at 12:26

## 2023-10-31 RX ADMIN — NICOTINE POLACRILEX 2 MG: 2 GUM, CHEWING BUCCAL at 00:34

## 2023-10-31 RX ADMIN — Medication 10 ML: at 01:14

## 2023-10-31 RX ADMIN — ACETAMINOPHEN 650 MG: 325 TABLET, FILM COATED ORAL at 00:34

## 2023-10-31 RX ADMIN — MORPHINE SULFATE 2 MG: 2 INJECTION, SOLUTION INTRAMUSCULAR; INTRAVENOUS at 00:37

## 2023-10-31 NOTE — CASE MANAGEMENT/SOCIAL WORK
Case Management Discharge Note      Final Note: DC Home with wife to transport Denied any needs or services.         Selected Continued Care - Discharged on 10/31/2023 Admission date: 10/30/2023 - Discharge disposition: Home or Self Care      Destination    No services have been selected for the patient.                Durable Medical Equipment    No services have been selected for the patient.                Dialysis/Infusion    No services have been selected for the patient.                Home Medical Care    No services have been selected for the patient.                Therapy    No services have been selected for the patient.                Community Resources    No services have been selected for the patient.                Community & DME    No services have been selected for the patient.                    Transportation Services  Private: Car    Final Discharge Disposition Code: 01 - home or self-care

## 2023-10-31 NOTE — CONSULTS
General Surgery Consult     Name:Ozzie Le  Age: 33 y.o.  Gender: male  : 1990  MRN: [id number]  Visit Number: [id number]  Admit Date: 10/30/2023  Date of Service: 10/31/23    Patient Care Team:  Provider, No Known as PCP - Jackelyn Wing MD as Consulting Physician (General Surgery)    Reason for Consultation: ***    Chief complaint ***      History of Present Illness:     Ozzie Le is a 33 y.o. male patient who presents with ***.           Past Medical History:   Diagnosis Date    Abscess of sigmoid colon due to diverticulitis 10/30/2023    Depression     Hypertension     Polycythemia vera        Past Surgical History:   Procedure Laterality Date    DENTAL PROCEDURE      HERNIA REPAIR      TONSILLECTOMY         Family History   Problem Relation Age of Onset    Cancer Mother     Kidney disease Mother     Heart disease Father     Polycythemia Father        Social History     Socioeconomic History    Marital status: Legally    Tobacco Use    Smoking status: Every Day     Packs/day: 1     Types: Cigarettes    Smokeless tobacco: Never   Vaping Use    Vaping Use: Never used   Substance and Sexual Activity    Alcohol use: Not Currently    Drug use: Never    Sexual activity: Yes       No current facility-administered medications for this encounter.    Current Outpatient Medications:     albuterol sulfate  (90 Base) MCG/ACT inhaler, Every 4 (Four) Hours. (Patient not taking: Reported on 10/31/2023), Disp: , Rfl:     aspirin 81 MG EC tablet, Take 1 tablet by mouth Daily. (Patient not taking: Reported on 10/31/2023), Disp: , Rfl:     buPROPion XL (WELLBUTRIN XL) 300 MG 24 hr tablet, , Disp: , Rfl:     cefdinir (OMNICEF) 300 MG capsule, Take 1 capsule by mouth 2 (Two) Times a Day for 10 days., Disp: 20 capsule, Rfl: 0    hydrOXYzine pamoate (VISTARIL) 25 MG capsule, Take 1 capsule by mouth 3 (Three) Times a Day As Needed for Anxiety. (Patient not taking: Reported on  "10/31/2023), Disp: 90 capsule, Rfl: 2    lisinopril (PRINIVIL,ZESTRIL) 10 MG tablet, , Disp: , Rfl:     loratadine (CLARITIN) 10 MG tablet, , Disp: , Rfl:     metroNIDAZOLE (Flagyl) 500 MG tablet, Take 1 tablet by mouth 3 (Three) Times a Day for 10 days., Disp: 30 tablet, Rfl: 0    No medications prior to admission.       No Known Allergies    Review of Systems    OBJECTIVE:     Vital Signs  Temp:  [97.4 °F (36.3 °C)-100 °F (37.8 °C)] 97.8 °F (36.6 °C)  Heart Rate:  [] 74  Resp:  [18-20] 18  BP: (112-144)/(62-84) 136/84    No intake/output data recorded.  I/O last 3 completed shifts:  In: 50 [P.O.:40; I.V.:10]  Out: 850 [Urine:850]      Physical Exam:    {General Physical Exam:61792}    Results Review:   {Results Review:86380::\"I have reviewed the entirety of the patient's clinical lab results.  I have also personally reviewed the patient's imaging\"}      Lab Results (last 72 hours)       Procedure Component Value Units Date/Time    Blood Culture With DRE - Blood, Hand, Right [[id number]]  (Normal) Collected: 10/30/23 2239    Specimen: Blood from Hand, Right Updated: 10/31/23 1100     Blood Culture No growth at less than 24 hours    Blood Culture With DRE - Blood, Arm, Left [[id number]]  (Normal) Collected: 10/30/23 2246    Specimen: Blood from Arm, Left Updated: 10/31/23 1100     Blood Culture No growth at less than 24 hours    CBC Auto Differential [[id number]]  (Abnormal) Collected: 10/31/23 0410    Specimen: Blood Updated: 10/31/23 0513     WBC 14.06 10*3/mm3      RBC 4.88 10*6/mm3      Hemoglobin 13.9 g/dL      Hematocrit 42.0 %      MCV 86.1 fL      MCH 28.5 pg      MCHC 33.1 g/dL      RDW 12.1 %      RDW-SD 38.0 fl      MPV 10.7 fL      Platelets 247 10*3/mm3      Neutrophil % 72.0 %      Lymphocyte % 16.6 %      Monocyte % 8.7 %      Eosinophil % 1.8 %      Basophil % 0.5 %      Immature Grans % 0.4 %      Neutrophils, Absolute 10.14 10*3/mm3      Lymphocytes, Absolute 2.33 10*3/mm3      Monocytes, " Absolute 1.22 10*3/mm3      Eosinophils, Absolute 0.25 10*3/mm3      Basophils, Absolute 0.07 10*3/mm3      Immature Grans, Absolute 0.05 10*3/mm3      nRBC 0.0 /100 WBC     Basic Metabolic Panel [[id number]]  (Abnormal) Collected: 10/31/23 0410    Specimen: Blood Updated: 10/31/23 0459     Glucose 105 mg/dL      BUN 10 mg/dL      Creatinine 0.98 mg/dL      Sodium 140 mmol/L      Potassium 4.0 mmol/L      Chloride 104 mmol/L      CO2 29.3 mmol/L      Calcium 8.9 mg/dL      BUN/Creatinine Ratio 10.2     Anion Gap 6.7 mmol/L      eGFR 105.1 mL/min/1.73     Narrative:      GFR Normal >60  Chronic Kidney Disease <60  Kidney Failure <15      Lactic Acid, Plasma [[id number]]  (Normal) Collected: 10/30/23 2200    Specimen: Blood Updated: 10/30/23 2230     Lactate 0.8 mmol/L     [facility] Draw [[id number]] Collected: 10/30/23 1930    Specimen: Blood Updated: 10/30/23 2045    Narrative:      The following orders were created for panel order [facility] Draw.  Procedure                               Abnormality         Status                     ---------                               -----------         ------                     Green Top (Gel)[[id number]]                                  Final result               Lavender Top[[id number]]                                     Final result               Gold Top - SST[[id number]]                                   Final result               Light Blue Top[[id number]]                                   Final result                 Please view results for these tests on the individual orders.    Green Top (Gel) [[id number]] Collected: 10/30/23 1930    Specimen: Blood Updated: 10/30/23 2045     Extra Tube Hold for add-ons.     Comment: Auto resulted.       Lavender Top [[id number]] Collected: 10/30/23 1930    Specimen: Blood Updated: 10/30/23 2045     Extra Tube hold for add-on     Comment: Auto resulted       Gold Top - SST [[id number]] Collected: 10/30/23 1930    Specimen: Blood Updated:  10/30/23 2045     Extra Tube Hold for add-ons.     Comment: Auto resulted.       Light Blue Top [[id number]] Collected: 10/30/23 1930    Specimen: Blood Updated: 10/30/23 2045     Extra Tube Hold for add-ons.     Comment: Auto resulted       Urinalysis With Microscopic If Indicated (No Culture) - Urine, Clean Catch [[id number]]  (Normal) Collected: 10/30/23 1947    Specimen: Urine, Clean Catch Updated: 10/30/23 1956     Color, UA Yellow     Appearance, UA Clear     pH, UA 6.5     Specific Gravity, UA 1.027     Glucose, UA Negative     Ketones, UA Negative     Bilirubin, UA Negative     Blood, UA Negative     Protein, UA Negative     Leuk Esterase, UA Negative     Nitrite, UA Negative     Urobilinogen, UA 1.0 E.U./dL    Narrative:      Urine microscopic not indicated.    Lipase [[id number]]  (Normal) Collected: 10/30/23 1930    Specimen: Blood Updated: 10/30/23 1951     Lipase 25 U/L     Comprehensive Metabolic Panel [[id number]] Collected: 10/30/23 1930    Specimen: Blood Updated: 10/30/23 1951     Glucose 97 mg/dL      BUN 11 mg/dL      Creatinine 0.95 mg/dL      Sodium 139 mmol/L      Potassium 4.1 mmol/L      Chloride 103 mmol/L      CO2 28.5 mmol/L      Calcium 9.2 mg/dL      Total Protein 7.4 g/dL      Albumin 4.1 g/dL      ALT (SGPT) 19 U/L      AST (SGOT) 16 U/L      Alkaline Phosphatase 80 U/L      Total Bilirubin <0.2 mg/dL      Globulin 3.3 gm/dL      A/G Ratio 1.2 g/dL      BUN/Creatinine Ratio 11.6     Anion Gap 7.5 mmol/L      eGFR 109.1 mL/min/1.73     Narrative:      GFR Normal >60  Chronic Kidney Disease <60  Kidney Failure <15      CBC & Differential [[id number]]  (Abnormal) Collected: 10/30/23 1930    Specimen: Blood Updated: 10/30/23 1938    Narrative:      The following orders were created for panel order CBC & Differential.  Procedure                               Abnormality         Status                     ---------                               -----------         ------                     CBC  "Auto Differential[[id number]]        Abnormal            Final result                 Please view results for these tests on the individual orders.    CBC Auto Differential [[id number]]  (Abnormal) Collected: 10/30/23 1930    Specimen: Blood Updated: 10/30/23 1938     WBC 16.14 10*3/mm3      RBC 5.05 10*6/mm3      Hemoglobin 14.2 g/dL      Hematocrit 42.3 %      MCV 83.8 fL      MCH 28.1 pg      MCHC 33.6 g/dL      RDW 12.1 %      RDW-SD 37.1 fl      MPV 10.1 fL      Platelets 264 10*3/mm3      Neutrophil % 75.5 %      Lymphocyte % 12.7 %      Monocyte % 9.4 %      Eosinophil % 1.5 %      Basophil % 0.5 %      Immature Grans % 0.4 %      Neutrophils, Absolute 12.19 10*3/mm3      Lymphocytes, Absolute 2.05 10*3/mm3      Monocytes, Absolute 1.52 10*3/mm3      Eosinophils, Absolute 0.24 10*3/mm3      Basophils, Absolute 0.08 10*3/mm3      Immature Grans, Absolute 0.06 10*3/mm3      nRBC 0.0 /100 WBC                             ASSESSMENT/PLAN:      Abscess of sigmoid colon due to diverticulitis      ***  I discussed the patients findings and my recommendations with {discussed with:34536::\"patient\"}.    Minda Stewart MD  10/31/23  19:14 EDT      Time: {Time spent:[id number]}    "

## 2023-10-31 NOTE — PROGRESS NOTES
"    [facility]    Name:  Ozzie Le   Age:  33 y.o.  Sex:  male  :  1990  MRN:  [id number]   Visit Number:  [id number]  Admission Date:  10/30/2023  Date Of Service:  10/31/23  Primary Care Physician:  Provider, No Known     LOS: 0 days :    Chief Complaint:      Abdominal pain    Subjective:    10/31/2023: Patient with persistent abdominal pain.  N.p.o. pending IR drain.  Went for the IR drain but they could not place it because it was too small of the area.  Awaiting further recommendations from Dr. Stewart who will be seeing the patient in consult.  History Of Presenting Illness:       Patient is an obese 33-year-old male with history significant for polycythemia vera, hypertension and depression who presents to the emergency room with complaints of worsening abdominal pain.  States that pain specifically is in his lower abdomen.  Began approximately 1 week ago.  Initially improved but came back today \"with a vengeance.\"  He describes the pain as constant.  No chest pain, shortness of breath, nausea vomiting or diarrhea.  He has never had anything like this before.  No sick contacts.  Patient smokes 1 pack/day.  Denies alcohol or illicit drug use.  On arrival to the ER, patient afebrile hemodynamically stable nonhypoxic on room air.  CMP unremarkable, lactate lipase normal.  CBC remarkable only for a WBC of 16.  Urinalysis unremarkable.  CT abdomen pelvis shows acute complicated perforated sigmoid diverticulitis with pericolonic abscess in the pelvis.  Initially, Dr. Stewart with general surgery was contacted by ER.  She denied admission and recommended transfer.  IR from outlying hospitals stated that IR in-house would be able to perform that guided drainage.  IR contacted in-house who agreed and recommended admission with plans for intervention in the [facility] service asked to admit.  Edited by: Stiven Guidry DO at 10/31/2023 1124     Review " of Systems:     All systems were reviewed and negative except as mentioned in subjective, assessment and plan.    Vital Signs:    Temp:  [97.4 °F (36.3 °C)-100 °F (37.8 °C)] 97.4 °F (36.3 °C)  Heart Rate:  [] 69  Resp:  [18-20] 20  BP: (112-144)/(62-80) 130/69    Intake and output:    I/O last 3 completed shifts:  In: 50 [P.O.:40; I.V.:10]  Out: 850 [Urine:850]  No intake/output data recorded.    Physical Examination:    Vitals reviewed.   Constitutional:       General: He is not in acute distress.     Appearance: He is obese.   HENT:      Head: Normocephalic and atraumatic.      Right Ear: External ear normal.      Left Ear: External ear normal.      Mouth/Throat:      Mouth: Mucous membranes are moist.      Pharynx: Oropharynx is clear.   Eyes:      Extraocular Movements: Extraocular movements intact.      Conjunctiva/sclera: Conjunctivae normal.   Cardiovascular:      Rate and Rhythm: Normal rate and regular rhythm.      Pulses: Normal pulses.      Heart sounds: Normal heart sounds.   Pulmonary:      Effort: Pulmonary effort is normal.      Breath sounds: Normal breath sounds.   Abdominal:      General: There is no distension.  Normoactive bowel sounds     Tenderness: There is abdominal tenderness.   Musculoskeletal:         General: Normal range of motion.   Skin:     General: Skin is warm and dry.   Neurological:      General: No focal deficit present.      Mental Status: He is alert and oriented to person, place, and time.   Edited by: Stiven Guidry DO at 10/31/2023 1124     Laboratory results:    Results from last 7 days   Lab Units 10/31/23  0410 10/30/23  1930   SODIUM mmol/L 140 139   POTASSIUM mmol/L 4.0 4.1   CHLORIDE mmol/L 104 103   CO2 mmol/L 29.3* 28.5   BUN mg/dL 10 11   CREATININE mg/dL 0.98 0.95   CALCIUM mg/dL 8.9 9.2   BILIRUBIN mg/dL  --  <0.2   ALK PHOS U/L  --  80   ALT (SGPT) U/L  --  19   AST (SGOT) U/L  --  16   GLUCOSE mg/dL 105* 97     Results from last 7 days   Lab Units  "10/31/23  0410 10/30/23  1930   WBC 10*3/mm3 14.06* 16.14*   HEMOGLOBIN g/dL 13.9 14.2   HEMATOCRIT % 42.0 42.3   PLATELETS 10*3/mm3 247 264             Results from last 7 days   Lab Units 10/30/23  2246 10/30/23  2239   BLOODCX  No growth at less than 24 hours No growth at less than 24 hours     No results for input(s): \"PHART\", \"QWQ4LTC\", \"PO2ART\", \"PJL3YSL\", \"BASEEXCESS\" in the last 8760 hours.   I have reviewed the patient's laboratory results.    Radiology results:    CT Abdomen Pelvis Without Contrast    Result Date: 10/30/2023  FINAL REPORT TECHNIQUE: Axial CT images were performed from the lung bases through the symphysis pubis without IV contrast.  This study was performed with techniques to keep radiation doses as low as reasonably achievable (ALARA). Individualized dose reduction techniques using automated exposure control or adjustment of mA and/or kV according to the patient's size were employed. CLINICAL HISTORY: Lower abdominal pain FINDINGS: Lack of intravenous contrast limits evaluation of solid abdominal and pelvic organs.  LOWER CHEST: The heart is normal size.  The lung bases are clear.  ABDOMEN/PELVIS:  Liver, gallbladder and bile ducts: The unenhanced liver is grossly unremarkable without focal abnormality.  The gallbladder is unremarkable.  There is no definite biliary duct dilatation. Adrenal glands: The adrenal glands are morphologically unremarkable without suspicious lesion.  Kidneys, ureter and urinary bladder: No nephrolithiasis.  No hydronephrosis. Urinary bladder is unremarkable.  Spleen: The spleen is normal size.  Pancreas: The pancreas is grossly unremarkable.  GI systems and mesentery: There is colonic diverticulosis with focal wall thickening and pericolonic inflammation about the sigmoid colon.  There is an extraluminal gas and fluid collection along the mesenteric aspect of the sigmoid colon compatible with a contained perforation.  There is also an adjacent pericolonic abscess " measuring 4.3 cm x 2.8 cm, best visualized on image 95 of series 2.  No evidence of bowel obstruction.  The appendix is visualized and unremarkable in appearance.  Lymph nodes: No definite pathologically enlarged abdominal or pelvic lymph nodes present within the limits of this noncontrast enhanced exam.  Vessels: The abdominal aorta is normal in caliber.  The inferior vena cava is unremarkable. Peritoneum: No free intraperitoneal fluid or pneumoperitoneum. Pelvic viscera: No acute findings.  Body wall: No body wall contusion.  Bones: No acute fracture.     Impression: Acute complicated perforated sigmoid diverticulitis with pericolonic abscess in the pelvis. Authenticated and Electronically Signed by Liam Hess MD on 10/30/2023 10:31:21 PM   I have reviewed the patient's radiology reports.    Medication Review:     I have reviewed the patient's active and prn medications.     Problem List:      Abscess of sigmoid colon due to diverticulitis      Assessment/Plan:    Perforated sigmoid diverticulitis with pericolonic abscess POA  Hypertension  Depression  Tobacco abuse  Morbid obesity     Plan:     Continue Zosyn.  Pain control.  IR could not place drain.  Discussed with Dr. Stewart she will see the patient in consult.  We will consider outpatient management versus transfer based on her recommendations.  Continue nicotine gum and patch.  Edited by: Stiven Guidry DO at 10/31/2023 1124     DVT Prophylaxis: Lovenox  Code Status:   Code Status and Medical Interventions:   Ordered at: 10/30/23 2327     Code Status (Patient has no pulse and is not breathing):    CPR (Attempt to Resuscitate)     Medical Interventions (Patient has pulse or is breathing):    Full Support      Diet:   Dietary Orders (From admission, onward)       Start     Ordered    10/31/23 0001  NPO Diet NPO Type: Strict NPO  Diet Effective Midnight        Question:  NPO Type  Answer:  Strict NPO    10/30/23 2327                   Discharge Plan:  Anticipate home in 1 to 2 days    Stiven Guidry DO  10/31/23  11:24 EDT    Dictated utilizing Dragon dictation.

## 2023-10-31 NOTE — ED NOTES
Transferred Dr. Blair, [facility], to [facility] at this time.

## 2023-10-31 NOTE — ED PROVIDER NOTES
"Subjective:  History of Present Illness:    Patient is a 33-year-old male without contributing health history.  Presents to the ER today with lower abdominal pain x1 week.  Reports that pain initially started to get better however started getting worse over the last 24 hours.  Reports low-grade fever.  Denies dysuria or frequency.  Denies change in bowel habit.  Denies OTC medication or home remedy.  Denies alleviating or exacerbating factors.    Nurses Notes reviewed and agree, including vitals, allergies, social history and prior medical history.     REVIEW OF SYSTEMS: All systems reviewed and not pertinent unless noted.  Review of Systems   Gastrointestinal:  Positive for abdominal pain.   All other systems reviewed and are negative.      Past Medical History:   Diagnosis Date    Abscess of sigmoid colon due to diverticulitis 10/30/2023    Depression     Hypertension        Allergies:    Patient has no known allergies.      Past Surgical History:   Procedure Laterality Date    DENTAL PROCEDURE      TONSILLECTOMY           Social History     Socioeconomic History    Marital status: Legally    Tobacco Use    Smoking status: Every Day     Packs/day: 1     Types: Cigarettes    Smokeless tobacco: Never   Vaping Use    Vaping Use: Never used   Substance and Sexual Activity    Alcohol use: Not Currently    Drug use: Never    Sexual activity: Defer         History reviewed. No pertinent family history.    Objective  Physical Exam:  /75   Pulse 101   Temp 100 °F (37.8 °C) (Oral)   Resp 18   Ht 190.5 cm (75\")   Wt (!) 142 kg (312 lb 6.4 oz)   SpO2 97%   BMI 39.05 kg/m²      Physical Exam  Vitals and nursing note reviewed.   Constitutional:       Appearance: He is well-developed and normal weight.   HENT:      Head: Normocephalic and atraumatic.      Mouth/Throat:      Mouth: Mucous membranes are moist.      Pharynx: Oropharynx is clear.   Eyes:      Extraocular Movements: Extraocular movements intact. "      Pupils: Pupils are equal, round, and reactive to light.   Cardiovascular:      Rate and Rhythm: Normal rate and regular rhythm.   Pulmonary:      Effort: Pulmonary effort is normal.      Breath sounds: Normal breath sounds.   Abdominal:      General: Abdomen is flat. Bowel sounds are normal.      Palpations: Abdomen is soft.      Tenderness: There is abdominal tenderness in the suprapubic area.   Skin:     General: Skin is warm and dry.      Capillary Refill: Capillary refill takes less than 2 seconds.   Neurological:      General: No focal deficit present.      Mental Status: He is alert and oriented to person, place, and time.   Psychiatric:         Mood and Affect: Mood normal.         Behavior: Behavior normal.         Procedures    ED Course:         Lab Results (last 24 hours)       Procedure Component Value Units Date/Time    CBC & Differential [[id number]]  (Abnormal) Collected: 10/30/23 1930    Specimen: Blood Updated: 10/30/23 1938    Narrative:      The following orders were created for panel order CBC & Differential.  Procedure                               Abnormality         Status                     ---------                               -----------         ------                     CBC Auto Differential[[id number]]        Abnormal            Final result                 Please view results for these tests on the individual orders.    Comprehensive Metabolic Panel [[id number]] Collected: 10/30/23 1930    Specimen: Blood Updated: 10/30/23 1951     Glucose 97 mg/dL      BUN 11 mg/dL      Creatinine 0.95 mg/dL      Sodium 139 mmol/L      Potassium 4.1 mmol/L      Chloride 103 mmol/L      CO2 28.5 mmol/L      Calcium 9.2 mg/dL      Total Protein 7.4 g/dL      Albumin 4.1 g/dL      ALT (SGPT) 19 U/L      AST (SGOT) 16 U/L      Alkaline Phosphatase 80 U/L      Total Bilirubin <0.2 mg/dL      Globulin 3.3 gm/dL      A/G Ratio 1.2 g/dL      BUN/Creatinine Ratio 11.6     Anion Gap 7.5 mmol/L      eGFR  109.1 mL/min/1.73     Narrative:      GFR Normal >60  Chronic Kidney Disease <60  Kidney Failure <15      Lipase [[id number]]  (Normal) Collected: 10/30/23 1930    Specimen: Blood Updated: 10/30/23 1951     Lipase 25 U/L     CBC Auto Differential [[id number]]  (Abnormal) Collected: 10/30/23 1930    Specimen: Blood Updated: 10/30/23 1938     WBC 16.14 10*3/mm3      RBC 5.05 10*6/mm3      Hemoglobin 14.2 g/dL      Hematocrit 42.3 %      MCV 83.8 fL      MCH 28.1 pg      MCHC 33.6 g/dL      RDW 12.1 %      RDW-SD 37.1 fl      MPV 10.1 fL      Platelets 264 10*3/mm3      Neutrophil % 75.5 %      Lymphocyte % 12.7 %      Monocyte % 9.4 %      Eosinophil % 1.5 %      Basophil % 0.5 %      Immature Grans % 0.4 %      Neutrophils, Absolute 12.19 10*3/mm3      Lymphocytes, Absolute 2.05 10*3/mm3      Monocytes, Absolute 1.52 10*3/mm3      Eosinophils, Absolute 0.24 10*3/mm3      Basophils, Absolute 0.08 10*3/mm3      Immature Grans, Absolute 0.06 10*3/mm3      nRBC 0.0 /100 WBC     Urinalysis With Microscopic If Indicated (No Culture) - Urine, Clean Catch [[id number]]  (Normal) Collected: 10/30/23 1947    Specimen: Urine, Clean Catch Updated: 10/30/23 1956     Color, UA Yellow     Appearance, UA Clear     pH, UA 6.5     Specific Gravity, UA 1.027     Glucose, UA Negative     Ketones, UA Negative     Bilirubin, UA Negative     Blood, UA Negative     Protein, UA Negative     Leuk Esterase, UA Negative     Nitrite, UA Negative     Urobilinogen, UA 1.0 E.U./dL    Narrative:      Urine microscopic not indicated.    Lactic Acid, Plasma [[id number]]  (Normal) Collected: 10/30/23 2200    Specimen: Blood Updated: 10/30/23 2230     Lactate 0.8 mmol/L     Blood Culture With DRE - Blood, Hand, Right [[id number]] Collected: 10/30/23 2239    Specimen: Blood from Hand, Right Updated: 10/30/23 2253    Blood Culture With DRE - Blood, Arm, Left [[id number]] Collected: 10/30/23 2246    Specimen: Blood from Arm, Left Updated: 10/30/23 5605              CT Abdomen Pelvis Without Contrast    Result Date: 10/30/2023  FINAL REPORT TECHNIQUE: Axial CT images were performed from the lung bases through the symphysis pubis without IV contrast.  This study was performed with techniques to keep radiation doses as low as reasonably achievable (ALARA). Individualized dose reduction techniques using automated exposure control or adjustment of mA and/or kV according to the patient's size were employed. CLINICAL HISTORY: Lower abdominal pain FINDINGS: Lack of intravenous contrast limits evaluation of solid abdominal and pelvic organs.  LOWER CHEST: The heart is normal size.  The lung bases are clear.  ABDOMEN/PELVIS:  Liver, gallbladder and bile ducts: The unenhanced liver is grossly unremarkable without focal abnormality.  The gallbladder is unremarkable.  There is no definite biliary duct dilatation. Adrenal glands: The adrenal glands are morphologically unremarkable without suspicious lesion.  Kidneys, ureter and urinary bladder: No nephrolithiasis.  No hydronephrosis. Urinary bladder is unremarkable.  Spleen: The spleen is normal size.  Pancreas: The pancreas is grossly unremarkable.  GI systems and mesentery: There is colonic diverticulosis with focal wall thickening and pericolonic inflammation about the sigmoid colon.  There is an extraluminal gas and fluid collection along the mesenteric aspect of the sigmoid colon compatible with a contained perforation.  There is also an adjacent pericolonic abscess measuring 4.3 cm x 2.8 cm, best visualized on image 95 of series 2.  No evidence of bowel obstruction.  The appendix is visualized and unremarkable in appearance.  Lymph nodes: No definite pathologically enlarged abdominal or pelvic lymph nodes present within the limits of this noncontrast enhanced exam.  Vessels: The abdominal aorta is normal in caliber.  The inferior vena cava is unremarkable. Peritoneum: No free intraperitoneal fluid or pneumoperitoneum. Pelvic  viscera: No acute findings.  Body wall: No body wall contusion.  Bones: No acute fracture.     Impression: Acute complicated perforated sigmoid diverticulitis with pericolonic abscess in the pelvis. Authenticated and Electronically Signed by Liam Hess MD on 10/30/2023 10:31:21 PM        MDM      Initial impression of presenting illness: Patient is a 33-year-old male without contributing health history.  Presents to the ER today with lower abdominal pain x1 week.  Reports that pain initially started to get better however started getting worse over the last 24 hours.  Reports low-grade fever.  Denies dysuria or frequency.  Denies change in bowel habit.  Denies OTC medication or home remedy.  Denies alleviating or exacerbating factors.    DDX: includes but is not limited to: Constipation, diverticulitis, urinary tract infection or other    Patient arrives stable with vitals interpreted by myself.     Pertinent features from physical exam: Lung sounds are clear bilaterally throughout.  Abdomen is soft.  Mild tenderness with palpation of suprapubic region.  Cardiac sounds are normal.  Bowel sounds are normal..    Initial diagnostic plan: CBC, CMP, lactate, lipase, blood cultures x2, CT abdomen pelvis without contrast, urinalysis    Results from initial plan were reviewed and interpreted by me revealing CBC is with elevation of white count.  CMP is within appropriate limit.  Lipase and lactic within appropriate limits, UA within appropriate limit.    Diagnostic information from other sources: Chart review    Interventions / Re-evaluation: Vital signs stable throughout counter.  Patient received Zosyn while in ER.    Results/clinical rationale were discussed with patient    Consultations/Discussion of results with other physicians: Call Dr. Walton.  Dr. Walton recommended transfer to [place] due to lack of IR coverage.  [facility]ed  [facility] spoke with Dr. Chatterjee.  After report Dr. Chatterjee for me that  is on divert.   Called [facility] and and spoke with radiologist Dr. Blair.  Dr. Blair informed that this procedure could be done at [facility].  Spoke with Dr. Herrera who is willing to admit patient.  Have contacted radiology to have patient added to IR list tomorrow.    Disposition plan: Admit to the hospital  -----        Final diagnoses:   Abscess of sigmoid colon due to diverticulitis   Perforation of sigmoid colon due to diverticulitis          Breezy Wright, APRN  10/30/23 8046

## 2023-10-31 NOTE — ED NOTES
Awaiting call back from Hospitalist at [facility].  Per Breezy, I am now contacting [facility] as well.

## 2023-10-31 NOTE — CASE MANAGEMENT/SOCIAL WORK
Discharge Planning Assessment  [facility]     Patient Name: Ozzie Le  MRN: [id number]  Today's Date: 10/31/2023    Admit Date: 10/30/2023    Plan: DC home with family to transport.   Discharge Needs Assessment       Row Name 10/31/23 1004       Living Environment    Current Living Arrangements home    Duration at Residence 1 year    Potentially Unsafe Housing Conditions unable to assess    In the past 12 months has the electric, gas, oil, or water company threatened to shut off services in your home? No    Primary Care Provided by self    Provides Primary Care For no one    Family Caregiver if Needed spouse    Family Caregiver Names Damaris/Spouse    Quality of Family Relationships unable to assess    Able to Return to Prior Arrangements yes       Resource/Environmental Concerns    Resource/Environmental Concerns none    Transportation Concerns none       Food Insecurity    Within the past 12 months, you worried that your food would run out before you got the money to buy more. Never true    Within the past 12 months, the food you bought just didn't last and you didn't have money to get more. Never true       Transition Planning    Patient/Family Anticipates Transition to home with family    Patient/Family Anticipated Services at Transition none    Transportation Anticipated family or friend will provide       Discharge Needs Assessment    Readmission Within the Last 30 Days no previous admission in last 30 days    Equipment Currently Used at Home none    Concerns to be Addressed denies needs/concerns at this time    Anticipated Changes Related to Illness none    Equipment Needed After Discharge none                   Discharge Plan       Row Name 10/31/23 1006       Plan    Plan DC home with family to transport.    Plan Comments CM spoke to pt and mother/Tia at bedside. Permission given to discuss DCP with both. Pt confirmed name,,address,insurance and phone numbers. No LW. No POA. Does not have a  "PCP and denied assistance to find one. \"I will get that set up when I get back home\"  Pharmacy used [facility].Agreed to meds to bed. No DME supplies at home. Lives with wife and 2 children. Plans to return home with family to transports. Denies any needs/services at this time.    Final Discharge Disposition Code 01 - home or self-care                  Continued Care and Services - Admitted Since 10/30/2023    Coordination has not been started for this encounter.          Demographic Summary       Row Name 10/31/23 1001       General Information    Admission Type observation    Arrived From emergency department    Referral Source admission list    Reason for Consult discharge planning    Preferred Language English       Contact Information    Permission Granted to Share Info With     Contact Information Obtained for                    Functional Status       Row Name 10/31/23 1002       Functional Status    Usual Activity Tolerance moderate    Current Activity Tolerance moderate       Physical Activity    On average, how many days per week do you engage in moderate to strenuous exercise (like a brisk walk)? 0 days    On average, how many minutes do you engage in exercise at this level? 0 min    Number of minutes of exercise per week 0       Assessment of Health Literacy    How often do you have someone help you read hospital materials? Never    How often do you have problems learning about your medical condition because of difficulty understanding written information? Never    How often do you have a problem understanding what is told to you about your medical condition? Never    How confident are you filling out medical forms by yourself? Extremely    Health Literacy Good       Functional Status, IADL    Medications independent    Meal Preparation independent    Housekeeping independent    Laundry independent    Shopping independent       Mental Status    General Appearance WDL WDL "       Mental Status Summary    Recent Changes in Mental Status/Cognitive Functioning no changes       Employment/    Employment Status employment seeking    Current or Previous Occupation healthcare    Employment/ Comments Worked [employer]    Current or Previous  Service none                   Psychosocial       Row Name 10/31/23 1004       Developmental Stage (Eriksson's)    Developmental Stage Stage 6 (18-35 years/Young Adulthood) Intimacy vs. Isolation                   Abuse/Neglect    No documentation.                  Legal    No documentation.                  Substance Abuse    No documentation.                  Patient Forms    No documentation.                     Zuri Bell RN

## 2023-10-31 NOTE — H&P
"  [facility]      Name:  Ozzie Le   Age:  33 y.o.  Sex:  male  :  1990  MRN:  [id number]   Visit Number:  [id number]  Admission Date:  10/30/2023  Date Of Service:  10/30/23  Primary Care Physician:  Provider, No Known    Chief Complaint:     Abdominal pain    History Of Presenting Illness:      Patient is an obese 33-year-old male with history significant for polycythemia vera, hypertension and depression who presents to the emergency room with complaints of worsening abdominal pain.  States that pain specifically is in his lower abdomen.  Began approximately 1 week ago.  Initially improved but came back today \"with a vengeance.\"  He describes the pain as constant.  No chest pain, shortness of breath, nausea vomiting or diarrhea.  He has never had anything like this before.  No sick contacts.  Patient smokes 1 pack/day.  Denies alcohol or illicit drug use.  On arrival to the ER, patient afebrile hemodynamically stable nonhypoxic on room air.  CMP unremarkable, lactate lipase normal.  CBC remarkable only for a WBC of 16.  Urinalysis unremarkable.  CT abdomen pelvis shows acute complicated perforated sigmoid diverticulitis with pericolonic abscess in the pelvis.  Initially, Dr. Stewart with general surgery was contacted by ER.  She denied admission and recommended transfer.  IR from outlying hospitals stated that IR in-house would be able to perform that guided drainage.  IR contacted in-house who agreed and recommended admission with plans for intervention in the [facility] service asked to admit.    Review Of Systems:    All systems were reviewed and negative except as mentioned in history of presenting illness, assessment and plan.    Past Medical History: Patient  has a past medical history of Abscess of sigmoid colon due to diverticulitis (10/30/2023), Depression, and Hypertension.    Past Surgical History: Patient  has a past surgical " history that includes Dental surgery and Tonsillectomy.    Social History: Patient  reports that he has been smoking cigarettes. He has been smoking an average of 1 pack per day. He has never used smokeless tobacco. He reports that he does not currently use alcohol. He reports that he does not use drugs.    Family History:  Patient's family history has been reviewed and found to be noncontributory.     Allergies:      Patient has no known allergies.    Home Medications:    Prior to Admission Medications       Prescriptions Last Dose Informant Patient Reported? Taking?    albuterol sulfate  (90 Base) MCG/ACT inhaler   Yes No    Every 4 (Four) Hours.    aspirin 81 MG EC tablet  Self Yes No    Take 81 mg by mouth Daily.    buPROPion XL (WELLBUTRIN XL) 300 MG 24 hr tablet   Yes No    hydrOXYzine pamoate (VISTARIL) 25 MG capsule   No No    Take 1 capsule by mouth 3 (Three) Times a Day As Needed for Anxiety.    lisinopril (PRINIVIL,ZESTRIL) 10 MG tablet   Yes No    loratadine (CLARITIN) 10 MG tablet   Yes No          ED Medications:    Medications   sodium chloride 0.9 % flush 10 mL (has no administration in time range)   sodium chloride 0.9 % flush 10 mL (has no administration in time range)   sodium chloride 0.9 % flush 10 mL (has no administration in time range)   sodium chloride 0.9 % infusion 40 mL (has no administration in time range)   acetaminophen (TYLENOL) tablet 650 mg (has no administration in time range)     Or   acetaminophen (TYLENOL) 160 MG/5ML oral solution 650 mg (has no administration in time range)     Or   acetaminophen (TYLENOL) suppository 650 mg (has no administration in time range)   sennosides-docusate (PERICOLACE) 8.6-50 MG per tablet 2 tablet (has no administration in time range)     And   polyethylene glycol (MIRALAX) packet 17 g (has no administration in time range)     And   bisacodyl (DULCOLAX) EC tablet 5 mg (has no administration in time range)     And   bisacodyl (DULCOLAX)  "suppository 10 mg (has no administration in time range)   ondansetron (ZOFRAN) injection 4 mg (has no administration in time range)   Enoxaparin Sodium (LOVENOX) syringe 40 mg (has no administration in time range)   piperacillin-tazobactam (ZOSYN) 3.375 g in iso-osmotic dextrose 50 ml (premix) (has no administration in time range)   piperacillin-tazobactam (ZOSYN) 3.375 g in iso-osmotic dextrose 50 ml (premix) (has no administration in time range)   melatonin tablet 5 mg (has no administration in time range)   Morphine sulfate (PF) injection 2 mg (has no administration in time range)     And   naloxone (NARCAN) injection 0.4 mg (has no administration in time range)   piperacillin-tazobactam (ZOSYN) 3.375 g in iso-osmotic dextrose 50 ml (premix) (3.375 g Intravenous New Bag 10/30/23 2251)     Vital Signs:  Temp:  [99.3 °F (37.4 °C)-100 °F (37.8 °C)] 100 °F (37.8 °C)  Heart Rate:  [101] 101  Resp:  [18] 18  BP: (124-144)/(75-80) 124/75        10/30/23  1920   Weight: (!) 142 kg (312 lb 6.4 oz)     Body mass index is 39.05 kg/m².    Physical Exam:     Most recent vital Signs: /75   Pulse 101   Temp 100 °F (37.8 °C) (Oral)   Resp 18   Ht 190.5 cm (75\")   Wt (!) 142 kg (312 lb 6.4 oz)   SpO2 97%   BMI 39.05 kg/m²     Physical Exam  Vitals reviewed.   Constitutional:       General: He is not in acute distress.     Appearance: He is obese.   HENT:      Head: Normocephalic and atraumatic.      Right Ear: External ear normal.      Left Ear: External ear normal.      Mouth/Throat:      Mouth: Mucous membranes are moist.      Pharynx: Oropharynx is clear.   Eyes:      Extraocular Movements: Extraocular movements intact.      Conjunctiva/sclera: Conjunctivae normal.   Cardiovascular:      Rate and Rhythm: Normal rate and regular rhythm.      Pulses: Normal pulses.      Heart sounds: Normal heart sounds.   Pulmonary:      Effort: Pulmonary effort is normal.      Breath sounds: Normal breath sounds.   Abdominal:      " General: There is no distension.      Tenderness: There is abdominal tenderness.   Musculoskeletal:         General: Normal range of motion.   Skin:     General: Skin is warm and dry.   Neurological:      General: No focal deficit present.      Mental Status: He is alert and oriented to person, place, and time.         Laboratory data:    I have reviewed the labs done in the emergency room.    Results from last 7 days   Lab Units 10/30/23  1930   SODIUM mmol/L 139   POTASSIUM mmol/L 4.1   CHLORIDE mmol/L 103   CO2 mmol/L 28.5   BUN mg/dL 11   CREATININE mg/dL 0.95   CALCIUM mg/dL 9.2   BILIRUBIN mg/dL <0.2   ALK PHOS U/L 80   ALT (SGPT) U/L 19   AST (SGOT) U/L 16   GLUCOSE mg/dL 97     Results from last 7 days   Lab Units 10/30/23  1930   WBC 10*3/mm3 16.14*   HEMOGLOBIN g/dL 14.2   HEMATOCRIT % 42.3   PLATELETS 10*3/mm3 264                     Results from last 7 days   Lab Units 10/30/23  1930   LIPASE U/L 25         Results from last 7 days   Lab Units 10/30/23  1947   COLOR UA  Yellow   GLUCOSE UA  Negative   KETONES UA  Negative   BLOOD UA  Negative   LEUKOCYTES UA  Negative   PH, URINE  6.5   BILIRUBIN UA  Negative   UROBILINOGEN UA  1.0 E.U./dL       Pain Management Panel           No data to display                EKG:          Radiology:    CT Abdomen Pelvis Without Contrast    Result Date: 10/30/2023  FINAL REPORT TECHNIQUE: Axial CT images were performed from the lung bases through the symphysis pubis without IV contrast.  This study was performed with techniques to keep radiation doses as low as reasonably achievable (ALARA). Individualized dose reduction techniques using automated exposure control or adjustment of mA and/or kV according to the patient's size were employed. CLINICAL HISTORY: Lower abdominal pain FINDINGS: Lack of intravenous contrast limits evaluation of solid abdominal and pelvic organs.  LOWER CHEST: The heart is normal size.  The lung bases are clear.  ABDOMEN/PELVIS:  Liver,  gallbladder and bile ducts: The unenhanced liver is grossly unremarkable without focal abnormality.  The gallbladder is unremarkable.  There is no definite biliary duct dilatation. Adrenal glands: The adrenal glands are morphologically unremarkable without suspicious lesion.  Kidneys, ureter and urinary bladder: No nephrolithiasis.  No hydronephrosis. Urinary bladder is unremarkable.  Spleen: The spleen is normal size.  Pancreas: The pancreas is grossly unremarkable.  GI systems and mesentery: There is colonic diverticulosis with focal wall thickening and pericolonic inflammation about the sigmoid colon.  There is an extraluminal gas and fluid collection along the mesenteric aspect of the sigmoid colon compatible with a contained perforation.  There is also an adjacent pericolonic abscess measuring 4.3 cm x 2.8 cm, best visualized on image 95 of series 2.  No evidence of bowel obstruction.  The appendix is visualized and unremarkable in appearance.  Lymph nodes: No definite pathologically enlarged abdominal or pelvic lymph nodes present within the limits of this noncontrast enhanced exam.  Vessels: The abdominal aorta is normal in caliber.  The inferior vena cava is unremarkable. Peritoneum: No free intraperitoneal fluid or pneumoperitoneum. Pelvic viscera: No acute findings.  Body wall: No body wall contusion.  Bones: No acute fracture.     Acute complicated perforated sigmoid diverticulitis with pericolonic abscess in the pelvis. Authenticated and Electronically Signed by Liam Hess MD on 10/30/2023 10:31:21 PM     Assessment:    Perforated sigmoid diverticulitis with pericolonic abscess POA  Hypertension  Depression  Tobacco abuse  Morbid obesity    Plan:    We will admit patient hospital for observation.  We will begin empiric antibiotics, Zosyn.  IR consulted with plans for guided drainage in the a.m.  Supportive care, antiemetics pain control as needed.  Nicotine gum and patch provided.  Patient counseled  on tobacco cessation.  Further orders as clinical course dictates.    Risk Assessment: High  DVT Prophylaxis: Lovenox  Code Status: Full  Diet: N.p.o. after midnight    Advance Care Planning   ACP discussion was held with the patient during this visit. Patient does not have an advance directive, information provided.           Hair Herrera DO  10/30/23  23:27 EDT    Dictated utilizing Dragon dictation.

## 2023-10-31 NOTE — PLAN OF CARE
Goal Outcome Evaluation:  Plan of Care Reviewed With: patient        Progress: improving  Outcome Evaluation: VSS/remains on room air.  Abd pain successfully treated with tylenol/morphine.  AM labs drawn.  Pt awaiting proposed am procedure.

## 2023-10-31 NOTE — DISCHARGE SUMMARY
"    [facility]      Name:  Ozzie Le   Age:  33 y.o.  Sex:  male  :  1990  MRN:  [id number]   Visit Number:  [id number]    Admission Date:  10/30/2023  Date of Discharge:  10/31/2023  Primary Care Physician:  Provider, No Known    Important issues to note:    Start: Cefdinir, Flagyl  Stop: Nothing  Follow up: PCP and general surgery  Brief Summary: Presented with abdominal pain due to perforated diverticulitis. Initially had been assessed for surgery and IR drain placement neither of which were necessary  and conservative management was decided upon by the time of discharge.  He was given prescriptions for antibiotics.    Discharge Diagnoses:       Abscess of sigmoid colon due to diverticulitis        Problem List:     Active Hospital Problems    Diagnosis  POA    **Abscess of sigmoid colon due to diverticulitis [K57.20]  Yes      Resolved Hospital Problems   No resolved problems to display.     Presenting Problem:    Chief Complaint   Patient presents with    Abdominal Pain      Consults:     Consulting Physician(s)         Provider   Role Specialty     Minda Stewart MD  Consulting Physician General Surgery          Procedures Performed:              History Of Presenting Illness:       Patient is an obese 33-year-old male with history significant for polycythemia vera, hypertension and depression who presents to the emergency room with complaints of worsening abdominal pain.  States that pain specifically is in his lower abdomen.  Began approximately 1 week ago.  Initially improved but came back today \"with a vengeance.\"  He describes the pain as constant.  No chest pain, shortness of breath, nausea vomiting or diarrhea.  He has never had anything like this before.  No sick contacts.  Patient smokes 1 pack/day.  Denies alcohol or illicit drug use.  On arrival to the ER, patient afebrile hemodynamically stable nonhypoxic on room air.  CMP " unremarkable, lactate lipase normal.  CBC remarkable only for a WBC of 16.  Urinalysis unremarkable.  CT abdomen pelvis shows acute complicated perforated sigmoid diverticulitis with pericolonic abscess in the pelvis.  Initially, Dr. Stewart with general surgery was contacted by ER.  She denied admission and recommended transfer.  IR from outlying hospitals stated that IR in-house would be able to perform that guided drainage.  IR contacted in-house who agreed and recommended admission with plans for intervention in the a.  Hospital service asked to admit.  Edited by: Stiven Guidry DO at 10/31/2023 1124     Hospital course:  Perforated sigmoid diverticulitis with pericolonic abscess POA  Hypertension  Depression  Tobacco abuse  Morbid obesity     Plan:     Patient was initially given IV antibiotics with Zosyn while inpatient.  Was assessed for percutaneous drain placement.  Per CT imaging they did not have good imaging windows to be able to place the drain therefore after further discussion with general surgery it was decided for conservative management with oral antibiotics prescriptions for cefdinir and Flagyl were sent to his pharmacy of choice.  Close follow-up with surgery outpatient.  Edited by: Stiven Guidry DO at 10/31/2023 1124      Vital Signs:    Temp:  [97.4 °F (36.3 °C)-100 °F (37.8 °C)] 97.8 °F (36.6 °C)  Heart Rate:  [] 69  Resp:  [18-20] 20  BP: (112-144)/(62-80) 130/69    Physical Exam:    Vitals reviewed.   Constitutional:       General: He is not in acute distress.     Appearance: He is obese.   HENT:      Head: Normocephalic and atraumatic.      Right Ear: External ear normal.      Left Ear: External ear normal.      Mouth/Throat:      Mouth: Mucous membranes are moist.      Pharynx: Oropharynx is clear.   Eyes:      Extraocular Movements: Extraocular movements intact.      Conjunctiva/sclera: Conjunctivae normal.   Cardiovascular:      Rate and Rhythm: Normal rate and regular  rhythm.      Pulses: Normal pulses.      Heart sounds: Normal heart sounds.   Pulmonary:      Effort: Pulmonary effort is normal.      Breath sounds: Normal breath sounds.   Abdominal:      General: There is no distension.  Normoactive bowel sounds     Tenderness: There is abdominal tenderness.   Musculoskeletal:         General: Normal range of motion.   Skin:     General: Skin is warm and dry.   Neurological:      General: No focal deficit present.      Mental Status: He is alert and oriented to person, place, and time.   Edited by: Stiven Guidry DO at 10/31/2023 1124    Pertinent Lab Results:     Results from last 7 days   Lab Units 10/31/23  0410 10/30/23  1930   SODIUM mmol/L 140 139   POTASSIUM mmol/L 4.0 4.1   CHLORIDE mmol/L 104 103   CO2 mmol/L 29.3* 28.5   BUN mg/dL 10 11   CREATININE mg/dL 0.98 0.95   CALCIUM mg/dL 8.9 9.2   BILIRUBIN mg/dL  --  <0.2   ALK PHOS U/L  --  80   ALT (SGPT) U/L  --  19   AST (SGOT) U/L  --  16   GLUCOSE mg/dL 105* 97     Results from last 7 days   Lab Units 10/31/23  0410 10/30/23  1930   WBC 10*3/mm3 14.06* 16.14*   HEMOGLOBIN g/dL 13.9 14.2   HEMATOCRIT % 42.0 42.3   PLATELETS 10*3/mm3 247 264                     Results from last 7 days   Lab Units 10/30/23  1930   LIPASE U/L 25         Results from last 7 days   Lab Units 10/30/23  2246 10/30/23  2239   BLOODCX  No growth at less than 24 hours No growth at less than 24 hours       Pertinent Radiology Results:    Imaging Results (All)       Procedure Component Value Units Date/Time    CT Abdomen Pelvis Without Contrast [[id number]] Collected: 10/31/23 1156     Updated: 10/31/23 1215    Narrative:      CT ABDOMEN AND PELVIS WITHOUT CONTRAST     INDICATION:  Diverticulitis, complication suspected;  K57.20-Diverticulitis of large intestine with perforation and abscess  without bleeding; K57.20-Diverticulitis of large intestine with  perforation and abscess without bleeding.      TECHNIQUE:  Thin section axial images were  obtained from the lung bases  through the pubic symphysis without oral or IV contrast.  Coronal  reconstruction images were obtained from the axial data.     COMPARISON: 10/30/2023     FINDINGS: There are no renal or ureteral stones.  There is no  hydronephrosis or perinephric stranding. The gallbladder is present.   The remaining unenhanced solid abdominal organs are within normal  limits.       Again seen is acute diverticulitis of the sigmoid colon. There is  extraluminal air in the sigmoid mesocolon with adjacent abnormal soft  tissue density and mesenteric edema. This is not a fluid collection and  it is not well-organized. The size is similar to exam done yesterday.  There is no small-bowel obstruction. The appendix is normal. There is no  lymphadenopathy or free fluid.       Impression:      Acute diverticulitis of the sigmoid colon with ill-defined  extraluminal air and phlegmon in the sigmoid mesocolon. This is not  well-organized at this time. Consider repeat imaging in several days to  see if this becomes a more organized collection of fluid and air.                          This study was performed with techniques to keep radiation doses as low  as reasonably achievable (ALARA). Individualized dose reduction  techniques using automated exposure control or adjustment of mA and/or  kV according to the patient size were employed.            This report was signed and finalized on 10/31/2023 12:13 PM by Marlene Oswald MD.       CT Abdomen Pelvis Without Contrast [[id number]] Collected: 10/30/23 2231     Updated: 10/30/23 2232    Narrative:      FINAL REPORT    TECHNIQUE:  Axial CT images were performed from the lung bases through the  symphysis pubis without IV contrast.  This study was performed  with techniques to keep radiation doses as low as reasonably  achievable (ALARA). Individualized dose reduction techniques  using automated exposure control or adjustment of mA and/or kV  according to the patient's  size were employed.    CLINICAL HISTORY:  Lower abdominal pain    FINDINGS:  Lack of intravenous contrast limits evaluation of solid  abdominal and pelvic organs.  LOWER CHEST: The heart is normal  size.  The lung bases are clear.  ABDOMEN/PELVIS:  Liver,  gallbladder and bile ducts: The unenhanced liver is grossly  unremarkable without focal abnormality.  The gallbladder is  unremarkable.  There is no definite biliary duct dilatation.  Adrenal glands: The adrenal glands are morphologically  unremarkable without suspicious lesion.  Kidneys, ureter and  urinary bladder: No nephrolithiasis.  No hydronephrosis.  Urinary bladder is unremarkable.  Spleen: The spleen is normal  size.  Pancreas: The pancreas is grossly unremarkable.  GI  systems and mesentery: There is colonic diverticulosis with  focal wall thickening and pericolonic inflammation about the  sigmoid colon.  There is an extraluminal gas and fluid  collection along the mesenteric aspect of the sigmoid colon  compatible with a contained perforation.  There is also an  adjacent pericolonic abscess measuring 4.3 cm x 2.8 cm, best  visualized on image 95 of series 2.  No evidence of bowel  obstruction.  The appendix is visualized and unremarkable in  appearance.  Lymph nodes: No definite pathologically enlarged  abdominal or pelvic lymph nodes present within the limits of  this noncontrast enhanced exam.  Vessels: The abdominal aorta is  normal in caliber.  The inferior vena cava is unremarkable.  Peritoneum: No free intraperitoneal fluid or pneumoperitoneum.  Pelvic viscera: No acute findings.  Body wall: No body wall  contusion.  Bones: No acute fracture.      Impression:      Acute complicated perforated sigmoid diverticulitis with  pericolonic abscess in the pelvis.    Authenticated and Electronically Signed by Liam Hess MD on  10/30/2023 10:31:21 PM            Echo:      Condition on Discharge:      Stable.    Code status during the hospital  stay:    Code Status and Medical Interventions:   Ordered at: 10/30/23 1202     Code Status (Patient has no pulse and is not breathing):    CPR (Attempt to Resuscitate)     Medical Interventions (Patient has pulse or is breathing):    Full Support     Discharge Disposition:    Home or Self Care    Discharge Medications:       Discharge Medications        New Medications        Instructions Start Date   cefdinir 300 MG capsule  Commonly known as: OMNICEF   300 mg, Oral, 2 Times Daily      metroNIDAZOLE 500 MG tablet  Commonly known as: Flagyl   500 mg, Oral, 3 Times Daily             Continue These Medications        Instructions Start Date   albuterol sulfate  (90 Base) MCG/ACT inhaler  Commonly known as: PROVENTIL HFA;VENTOLIN HFA;PROAIR HFA   Every 4 Hours      aspirin 81 MG EC tablet   81 mg, Daily      buPROPion  MG 24 hr tablet  Commonly known as: WELLBUTRIN XL   No dose, route, or frequency recorded.      hydrOXYzine pamoate 25 MG capsule  Commonly known as: VISTARIL   25 mg, Oral, 3 Times Daily PRN      lisinopril 10 MG tablet  Commonly known as: PRINIVIL,ZESTRIL   No dose, route, or frequency recorded.      loratadine 10 MG tablet  Commonly known as: CLARITIN   No dose, route, or frequency recorded.             Discharge Diet:     Diet Instructions       Advance Diet As Tolerated -Target Diet: full liquid      Target Diet: full liquid          Activity at Discharge:       Follow-up Appointments:    Additional Instructions for the Follow-ups that You Need to Schedule       Discharge Follow-up with PCP   As directed       Currently Documented PCP:    Provider, No Known    PCP Phone Number:    [phone]     Follow Up Details: 1 week        Discharge Follow-up with Specified Provider: Dr. Stewart; 1 Week   As directed      To: Dr. Stewart   Follow Up: 1 Week               Follow-up Information       Provider, No Known .    Why: 1 week  Contact information:  [facility]  [id number]  [phone]                           Future Appointments   Date Time Provider Department Center   11/13/2023  1:45 PM [facility]   11/29/2023  1:30 PM Jaimee Anderson APRN [facility]     Test Results Pending at Discharge:    Pending Labs       Order Current Status    Blood Culture With DRE - Blood, Arm, Left Preliminary result    Blood Culture With DRE - Blood, Hand, Right Preliminary result               Stiven Guidry DO  10/31/23  13:35 EDT    Time: I spent 45 minutes on this discharge activity which included: face-to-face encounter with the patient, reviewing the data in the system, coordination of the care with the nursing staff as well as consultants, documentation, and entering orders.     Dictated utilizing Dragon dictation.

## 2023-11-01 PROBLEM — J00 ACUTE NASOPHARYNGITIS (COMMON COLD): Status: ACTIVE | Noted: 2020-01-20

## 2023-11-01 PROBLEM — E66.01 MORBID OBESITY WITH BODY MASS INDEX (BMI) OF 40.0 OR HIGHER: Status: ACTIVE | Noted: 2023-03-27

## 2023-11-01 PROBLEM — J20.9 ACUTE BRONCHITIS, UNSPECIFIED: Status: ACTIVE | Noted: 2019-02-21

## 2023-11-01 PROBLEM — G47.33 OSA (OBSTRUCTIVE SLEEP APNEA): Status: ACTIVE | Noted: 2023-08-07

## 2023-11-01 PROBLEM — K21.9 GASTRO-ESOPHAGEAL REFLUX DISEASE WITHOUT ESOPHAGITIS: Chronic | Status: ACTIVE | Noted: 2019-06-07

## 2023-11-01 PROBLEM — D75.0: Status: ACTIVE | Noted: 2023-04-27

## 2023-11-01 PROBLEM — Z02.1 ENCOUNTER FOR PRE-EMPLOYMENT EXAMINATION: Status: ACTIVE | Noted: 2018-01-16

## 2023-11-01 PROBLEM — F17.200 NICOTINE DEPENDENCE: Chronic | Status: ACTIVE | Noted: 2019-06-07

## 2023-11-01 PROBLEM — R21 RASH AND OTHER NONSPECIFIC SKIN ERUPTION: Status: ACTIVE | Noted: 2019-06-07

## 2023-11-02 NOTE — PROGRESS NOTES
Subjective   Ozzie Le is a 33 y.o. male.   Chief Complaint   Patient presents with    Follow-up    Abscess        History of Present Illness   Mr. Le comes the office today for follow-up after his recent brief hospitalization for sigmoid diverticulitis with formation of a small peridiverticular abscess.  He was treated medically with bowel rest followed by dietary limitations, and antibiotic therapy.  He reports that he has completed his antibiotics, and reports that his health status has returned to baseline.  He denies any fever, chills, nausea, vomiting, diarrhea, or constipation.  He describes his bowel habits as normal.  He denies any residual pain in the left lower quadrant.      The following portions of the patient's history were reviewed and updated as appropriate: allergies, current medications, past family history, past medical history, past social history, past surgical history, and problem list.    Review of Systems   Constitutional:  Negative for chills, fever and unexpected weight loss.   HENT:  Negative for hearing loss, trouble swallowing and voice change.    Eyes:  Negative for visual disturbance.   Respiratory:  Negative for apnea, cough, chest tightness, shortness of breath and wheezing.    Cardiovascular:  Negative for chest pain, palpitations and leg swelling.   Gastrointestinal:  Negative for abdominal distention, abdominal pain, anal bleeding, blood in stool, constipation, diarrhea, nausea, rectal pain, vomiting, GERD and indigestion.   Endocrine: Negative for cold intolerance and heat intolerance.   Genitourinary:  Negative for difficulty urinating, dysuria and flank pain.   Musculoskeletal:  Negative for back pain and gait problem.   Skin:  Negative for color change, rash, skin lesions and wound.   Neurological:  Negative for dizziness, syncope, speech difficulty, weakness, light-headedness and numbness.   Hematological:  Negative for adenopathy. Does not bruise/bleed  "easily.   Psychiatric/Behavioral:  Negative for depressed mood. The patient is not nervous/anxious.    I have reviewed and confirmed the accuracy of the ROS as documented by the MA/LPN/RN Minda Stewart MD      Objective   /80   Pulse 90   Temp 98.6 °F (37 °C)   Ht 190.5 cm (75\")   Wt (!) 140 kg (307 lb 12.8 oz)   SpO2 97%   BMI 38.47 kg/m²     Physical Exam  Constitutional:       Appearance: He is well-developed.   HENT:      Head: Normocephalic and atraumatic.   Eyes:      General: No scleral icterus.     Pupils: Pupils are equal, round, and reactive to light.   Cardiovascular:      Rate and Rhythm: Regular rhythm.   Pulmonary:      Effort: Pulmonary effort is normal.   Abdominal:      General: There is no distension.      Palpations: Abdomen is soft.      Tenderness: There is no abdominal tenderness.   Musculoskeletal:      Cervical back: Neck supple.   Skin:     General: Skin is warm and dry.   Neurological:      Mental Status: He is alert and oriented to person, place, and time.   Psychiatric:         Behavior: Behavior normal.           Assessment & Plan   Diagnoses and all orders for this visit:    1. Diverticulitis of large intestine with abscess without bleeding (Primary)  -     CT Abdomen Pelvis With Contrast; Future      I had a long discussion with the patient in the office today regarding follow-up of his recent episode of complicated diverticulitis.  I did recommend a follow-up CT scan of the abdomen pelvis to ensure resolution of the previous phlegmon/abscess.  Orders were placed for the same.  I will plan to see him back for follow-up once his imaging has been completed.  We discussed ongoing caution with regards to his diet, and we discussed the signs and symptoms of concern that would necessitate more urgent reevaluation.  He understands, and is agreeable to imaging as recommended, along with in office follow-up.           "

## 2023-11-04 LAB
BACTERIA SPEC AEROBE CULT: NORMAL
BACTERIA SPEC AEROBE CULT: NORMAL

## 2023-11-07 ENCOUNTER — OFFICE VISIT (OUTPATIENT)
Dept: SURGERY | Facility: CLINIC | Age: 33
End: 2023-11-07
Payer: MEDICAID

## 2023-11-07 VITALS
DIASTOLIC BLOOD PRESSURE: 80 MMHG | TEMPERATURE: 98.6 F | OXYGEN SATURATION: 97 % | SYSTOLIC BLOOD PRESSURE: 130 MMHG | BODY MASS INDEX: 38.27 KG/M2 | WEIGHT: 307.8 LBS | HEART RATE: 90 BPM | HEIGHT: 75 IN

## 2023-11-07 DIAGNOSIS — K57.20 DIVERTICULITIS OF LARGE INTESTINE WITH ABSCESS WITHOUT BLEEDING: Primary | ICD-10-CM

## 2023-11-07 PROCEDURE — 99213 OFFICE O/P EST LOW 20 MIN: CPT | Performed by: SURGERY

## 2023-11-07 PROCEDURE — 1160F RVW MEDS BY RX/DR IN RCRD: CPT | Performed by: SURGERY

## 2023-11-07 PROCEDURE — 1159F MED LIST DOCD IN RCRD: CPT | Performed by: SURGERY

## 2023-12-04 ENCOUNTER — HOSPITAL ENCOUNTER (OUTPATIENT)
Dept: CT IMAGING | Facility: HOSPITAL | Age: 33
Discharge: HOME | End: 2023-12-04
Admitting: SURGERY
Payer: MEDICAID

## 2023-12-04 DIAGNOSIS — K57.20 DIVERTICULITIS OF LARGE INTESTINE WITH ABSCESS WITHOUT BLEEDING: ICD-10-CM

## 2023-12-04 PROCEDURE — 25510000001 IOPAMIDOL 61 % SOLUTION: Performed by: SURGERY

## 2023-12-04 PROCEDURE — 74177 CT ABD & PELVIS W/CONTRAST: CPT

## 2023-12-04 RX ADMIN — IOPAMIDOL 100 ML: 612 INJECTION, SOLUTION INTRAVENOUS at 17:33

## 2023-12-18 NOTE — PROGRESS NOTES
Subjective   Ozzie Le is a 33 y.o. male.   Chief Complaint   Patient presents with    Follow-up     Ct abd pelvis        History of Present Illness   Mr. Le returns the office today for follow-up after undergoing a repeat CT scan of the abdomen pelvis to reevaluate his recent episode of sigmoid diverticulitis with formation of a small pericolonic abscess.  The patient was managed nonoperatively, and completed a course of broad-spectrum antibiotic therapy.  Today, he reports that he is feeling well, and has no particular abdominal complaints.  He reports regular bowel movements.  He reports that he is tolerating a diet without difficulty.    Repeat CT imaging demonstrated significant interval improvement in the mid sigmoid diverticulitis with significant interval decrease in the infiltration of the adjacent fat, fluid collection/phlegmon, and extraluminal air when compared to the prior study.  There is still a residual 2 cm long collection of extraluminal air, and a residual 1.9 cm phlegmon he denies fevers or chills..      The following portions of the patient's history were reviewed and updated as appropriate: allergies, current medications, past family history, past medical history, past social history, past surgical history, and problem list.    Review of Systems   Constitutional:  Negative for chills, fever and unexpected weight loss.   HENT:  Negative for hearing loss, trouble swallowing and voice change.    Eyes:  Negative for visual disturbance.   Respiratory:  Negative for apnea, cough, chest tightness, shortness of breath and wheezing.    Cardiovascular:  Negative for chest pain, palpitations and leg swelling.   Gastrointestinal:  Positive for abdominal pain. Negative for abdominal distention, anal bleeding, blood in stool, constipation, diarrhea, nausea, rectal pain, vomiting, GERD and indigestion.   Endocrine: Negative for cold intolerance and heat intolerance.   Genitourinary:  Negative  "for difficulty urinating, dysuria and flank pain.   Musculoskeletal:  Negative for back pain and gait problem.   Skin:  Negative for color change, rash, skin lesions and wound.   Neurological:  Negative for dizziness, syncope, speech difficulty, weakness, light-headedness and numbness.   Hematological:  Negative for adenopathy. Does not bruise/bleed easily.   Psychiatric/Behavioral:  Negative for depressed mood. The patient is not nervous/anxious.    I have reviewed and confirmed the accuracy of the ROS as documented by the MA/LPN/RN Minda Stewart MD      Objective   /72   Temp 97.1 °F (36.2 °C) (Temporal)   Resp 16   Ht 190.5 cm (75\")   Wt (!) 142 kg (312 lb)   BMI 39.00 kg/m²     Physical Exam  Constitutional:       Appearance: He is well-developed.   HENT:      Head: Normocephalic and atraumatic.   Cardiovascular:      Rate and Rhythm: Regular rhythm.   Pulmonary:      Effort: Pulmonary effort is normal.   Abdominal:      General: There is no distension.      Palpations: Abdomen is soft.      Tenderness: There is no abdominal tenderness.   Musculoskeletal:      Cervical back: Neck supple.   Skin:     General: Skin is warm and dry.   Neurological:      Mental Status: He is alert and oriented to person, place, and time.   Psychiatric:         Behavior: Behavior normal.       Outside Records:  I have taken the opportunity to review the patient's available outside records in paper format, scanned format and those available on Care Everywhere    Results Review:  I have personally reviewed the relevant patient imaging.     Narrative & Impression   PROCEDURE: CT ABDOMEN PELVIS W CONTRAST-     HISTORY: Diverticulitis, complication suspected; K57.20-Diverticulitis  of large intestine with perforation and abscess without bleeding     COMPARISON: October 31, 2023.     PROCEDURE: The patient was injected with IV contrast. Oral contrast was  administered. Axial images were obtained from the lung bases to " the  pubic symphysis by computed tomography. This study was performed with  techniques to keep radiation doses as low as reasonably achievable,  (ALARA). Individualized dose reduction techniques using automated  exposure control or adjustment of mA and/or kV according to the patient  size were employed.     FINDINGS:     ABDOMEN: The lung bases are clear. The heart is proper size. The liver  is homogenous with no focal abnormality. Gallbladder present with no CT  visible stones the spleen is unremarkable. No adrenal mass is present.   The pancreas is unremarkable. The kidneys are unremarkable, without  evidence of mass or hydronephrosis. The aorta is proper caliber. There  is no free fluid or adenopathy.     PELVIS: The GI tract demonstrates no obstruction.  The appendix is  identified and appears normal. The urinary bladder is unremarkable. No  free fluid identified in the pelvis. Again noted is infiltration of the  fat surrounding the mid sigmoid colon consistent with diverticulitis.  This is improved compared to prior study. There is still a 2 cm long  collection of extraluminal air but this is also decreased from prior.  Previously there is a 37 mm fluid collection/phlegmon which is now  decreased to 19 mm. There is mild wall thickening of the adjacent mid  sigmoid colon and multiple diverticula.     IMPRESSION:  Significant interval improvement in mid sigmoid  diverticulitis with significant interval decrease in infiltration of the  adjacent fat, fluid collection/phlegmon and extraluminal air compared to  prior.           CTDI: 15.05 mGy  DLP:837.04 mGy.cm     This report was signed and finalized on 12/5/2023 8:06 AM by Juanita Agosto MD.         Assessment & Plan   Diagnoses and all orders for this visit:    1. Diverticulitis of sigmoid colon (Primary)      We discussed the CT findings with Mr. Le in the office today.  Clinically, he is asymptomatic.  His CT demonstrates significant improvement in his  diverticulitis, but not yet complete resolution.  I recommended that this be followed with a diagnostic colonoscopy for complete evaluation of the colon given his recent episode of diverticulitis, and to specifically exclude other underlying etiologies of his CT scan findings.  We discussed this at length.  The patient is agreeable to colonoscopy, but tells me that he will need to call back to make scheduling arrangements. We discussed the indications for colonoscopy as well as the risks, benefits and alternatives to this procedure. Risks including but not limited to perforation, bleeding,need for blood transfusion or emergent surgery ,and missed neoplasm were reviewed in detail with the patient. The necessary bowel preparation and pre-procedure clear liquid diet was explained in detail.  A written instructional handout was also provided.  Electronic prescriptions for miralax and dulcolax were sent to the patient's pharmacy.  The patient was given an opportunity to ask questions.  The patient verbalized understanding of these recommendations and the plan of care. The patient is willing to proceed with colonoscopy and will call back when he is ready to make scheduling arrangements.  At that point, my office will assist him with scheduling for the colonoscopy procedure and pre-admission testing.  He was counseled once again regarding the signs and symptoms of concern, and knows to return to the office should any of these symptoms develop in the interim.

## 2023-12-20 ENCOUNTER — OFFICE VISIT (OUTPATIENT)
Dept: BEHAVIORAL HEALTH | Facility: CLINIC | Age: 33
End: 2023-12-20
Payer: MEDICAID

## 2023-12-20 DIAGNOSIS — F33.0 MILD EPISODE OF RECURRENT MAJOR DEPRESSIVE DISORDER: Primary | ICD-10-CM

## 2023-12-21 ENCOUNTER — OFFICE VISIT (OUTPATIENT)
Dept: SURGERY | Facility: CLINIC | Age: 33
End: 2023-12-21
Payer: MEDICAID

## 2023-12-21 VITALS
DIASTOLIC BLOOD PRESSURE: 72 MMHG | TEMPERATURE: 97.1 F | RESPIRATION RATE: 16 BRPM | SYSTOLIC BLOOD PRESSURE: 140 MMHG | WEIGHT: 312 LBS | HEIGHT: 75 IN | BODY MASS INDEX: 38.79 KG/M2

## 2023-12-21 DIAGNOSIS — K57.32 DIVERTICULITIS OF SIGMOID COLON: Primary | ICD-10-CM

## 2023-12-21 PROCEDURE — 1160F RVW MEDS BY RX/DR IN RCRD: CPT | Performed by: SURGERY

## 2023-12-21 PROCEDURE — 99213 OFFICE O/P EST LOW 20 MIN: CPT | Performed by: SURGERY

## 2023-12-21 PROCEDURE — 3078F DIAST BP <80 MM HG: CPT | Performed by: SURGERY

## 2023-12-21 PROCEDURE — 1159F MED LIST DOCD IN RCRD: CPT | Performed by: SURGERY

## 2023-12-21 PROCEDURE — 3077F SYST BP >= 140 MM HG: CPT | Performed by: SURGERY

## 2023-12-22 ENCOUNTER — OFFICE VISIT (OUTPATIENT)
Dept: FAMILY MEDICINE CLINIC | Facility: CLINIC | Age: 33
End: 2023-12-22
Payer: MEDICAID

## 2023-12-22 VITALS
BODY MASS INDEX: 39.17 KG/M2 | SYSTOLIC BLOOD PRESSURE: 122 MMHG | HEIGHT: 75 IN | TEMPERATURE: 97.5 F | HEART RATE: 72 BPM | RESPIRATION RATE: 16 BRPM | OXYGEN SATURATION: 96 % | DIASTOLIC BLOOD PRESSURE: 82 MMHG | WEIGHT: 315 LBS

## 2023-12-22 DIAGNOSIS — E88.810 METABOLIC SYNDROME X: Primary | ICD-10-CM

## 2023-12-22 DIAGNOSIS — G47.33 OSA (OBSTRUCTIVE SLEEP APNEA): ICD-10-CM

## 2023-12-22 DIAGNOSIS — J01.00 ACUTE NON-RECURRENT MAXILLARY SINUSITIS: ICD-10-CM

## 2023-12-22 DIAGNOSIS — J30.9 ALLERGIC RHINITIS, UNSPECIFIED SEASONALITY, UNSPECIFIED TRIGGER: ICD-10-CM

## 2023-12-22 DIAGNOSIS — E78.5 DYSLIPIDEMIA: ICD-10-CM

## 2023-12-22 DIAGNOSIS — E66.01 CLASS 2 SEVERE OBESITY DUE TO EXCESS CALORIES WITH SERIOUS COMORBIDITY AND BODY MASS INDEX (BMI) OF 39.0 TO 39.9 IN ADULT: ICD-10-CM

## 2023-12-22 DIAGNOSIS — F17.210 CIGARETTE NICOTINE DEPENDENCE WITHOUT COMPLICATION: Chronic | ICD-10-CM

## 2023-12-22 DIAGNOSIS — I10 PRIMARY HYPERTENSION: ICD-10-CM

## 2023-12-22 PROBLEM — E66.812 CLASS 2 SEVERE OBESITY DUE TO EXCESS CALORIES WITH SERIOUS COMORBIDITY AND BODY MASS INDEX (BMI) OF 39.0 TO 39.9 IN ADULT: Status: ACTIVE | Noted: 2023-03-27

## 2023-12-22 PROBLEM — J20.9 ACUTE BRONCHITIS, UNSPECIFIED: Status: RESOLVED | Noted: 2019-02-21 | Resolved: 2023-12-22

## 2023-12-22 RX ORDER — LISINOPRIL 10 MG/1
10 TABLET ORAL DAILY
Qty: 30 TABLET | Refills: 2 | Status: SHIPPED | OUTPATIENT
Start: 2023-12-22

## 2023-12-22 RX ORDER — FLUTICASONE PROPIONATE 50 MCG
2 SPRAY, SUSPENSION (ML) NASAL DAILY
Qty: 18.2 ML | Refills: 2 | Status: SHIPPED | OUTPATIENT
Start: 2023-12-22

## 2023-12-22 RX ORDER — AMOXICILLIN 500 MG/1
500 CAPSULE ORAL 3 TIMES DAILY
Qty: 21 CAPSULE | Refills: 0 | Status: SHIPPED | OUTPATIENT
Start: 2023-12-22 | End: 2023-12-29

## 2023-12-22 RX ORDER — NICOTINE 21 MG/24HR
1 PATCH, TRANSDERMAL 24 HOURS TRANSDERMAL EVERY 24 HOURS
Qty: 28 PATCH | Refills: 0 | Status: SHIPPED | OUTPATIENT
Start: 2023-12-22 | End: 2024-01-19

## 2023-12-22 RX ORDER — SUMATRIPTAN 50 MG/1
TABLET, FILM COATED ORAL
Qty: 10 TABLET | Refills: 1 | Status: CANCELLED | OUTPATIENT
Start: 2023-12-22

## 2023-12-22 RX ORDER — LEVOCETIRIZINE DIHYDROCHLORIDE 5 MG/1
5 TABLET, FILM COATED ORAL EVERY EVENING
Qty: 30 TABLET | Refills: 2 | Status: SHIPPED | OUTPATIENT
Start: 2023-12-22

## 2023-12-22 RX ORDER — TOPIRAMATE 25 MG/1
25 TABLET ORAL NIGHTLY
Qty: 30 TABLET | Refills: 1 | Status: CANCELLED | OUTPATIENT
Start: 2023-12-22

## 2023-12-22 NOTE — PROGRESS NOTES
"    Office Note     Name: Ozzie Le  : 1990   MRN: [id number]     Chief Complaint:  Establish Care (Pt has not had a PCP for about a year. Dental and vision are not up to date.) and Headache (Pt reports migraines that have reoccurred for years. The last month they have occurred at least 30 times. He feels they happen every night. /)    Subjective     History of Present Illness:  Ozzie Le is a 33 y.o. male who presents today as a new patient to establish care.     PMH significant for: polycythemia, anxiety/depression d/o, sleep apnea,Tobacco abuse.    Polycythemia following with Heme/onc    Sleep apnea: He was given the diagnosis of BRIGIDA, due to insurance issues he was unable to get CPAP machine. The patient reports that his spouse has told him that he sometimes stops breathing at night and snores loudly.    Obesity: BMI of 39.95, He has been trying to eat less and make healthier choices.    Anxiety/Depression: He was taking Wellbutrin,it was helping his symptoms. Since his PCP relocated he has not been able to get his medicine.    Tobacco abuse: Smokes 1ppd. Wants to quit.     Headaches: Hx of migraines per patient. No HA in years then recently started reoccurring. Happen at night mostly before bed. Reports he was given allergy medication previously which helped resolve the HA. HA aborted with Goody's powder.      I have reviewed the following portions of the patient's history and these were updated and discussed with the patient as appropriate: past family history, past medical history, past social history, past surgical history, and problem list.     Objective     Vital Signs  /82   Pulse 72   Temp 97.5 °F (36.4 °C)   Resp 16   Ht 190.5 cm (75\")   Wt (!) 145 kg (319 lb 9.6 oz)   SpO2 96%   BMI 39.95 kg/m²   Estimated body mass index is 39.95 kg/m² as calculated from the following:    Height as of this encounter: 190.5 cm (75\").    Weight as of this encounter: 145 kg (319 " lb 9.6 oz).    Physical Exam  Vitals reviewed.   Constitutional:       General: He is not in acute distress.     Appearance: Normal appearance. He is not ill-appearing or toxic-appearing.   HENT:      Head: Normocephalic.      Right Ear: Tympanic membrane, ear canal and external ear normal.      Left Ear: Tympanic membrane, ear canal and external ear normal.      Nose: Congestion present. No rhinorrhea.      Mouth/Throat:      Mouth: Mucous membranes are moist.      Pharynx: Posterior oropharyngeal erythema present. No oropharyngeal exudate.      Comments: PND noted  Eyes:      Extraocular Movements: Extraocular movements intact.   Cardiovascular:      Rate and Rhythm: Normal rate and regular rhythm.      Heart sounds: Normal heart sounds. No murmur heard.  Pulmonary:      Effort: Pulmonary effort is normal. No respiratory distress.      Breath sounds: Normal breath sounds. No stridor. No wheezing, rhonchi or rales.   Chest:      Chest wall: No tenderness.   Musculoskeletal:         General: Normal range of motion.      Cervical back: Normal range of motion and neck supple. No rigidity or tenderness.   Lymphadenopathy:      Cervical: No cervical adenopathy.   Skin:     General: Skin is warm and dry.   Neurological:      Mental Status: He is alert and oriented to person, place, and time.   Psychiatric:         Mood and Affect: Mood normal.         Behavior: Behavior normal.                      Assessment and Plan     Diagnoses and all orders for this visit:    1. Metabolic syndrome X (Primary)    2. Class 2 severe obesity due to excess calories with serious comorbidity and body mass index (BMI) of 39.0 to 39.9 in adult    3. Cigarette nicotine dependence without complication  -     nicotine polacrilex (NICORETTE) 4 MG gum; Chew 1 each As Needed for Smoking Cessation.  Dispense: 220 each; Refill: 1  -     nicotine (Nicotine Step 1) 21 MG/24HR patch; Place 1 patch on the skin as directed by provider Daily for 28 days.   Dispense: 28 patch; Refill: 0    4. BRIGIDA (obstructive sleep apnea)    5. Dyslipidemia    6. Allergic rhinitis, unspecified seasonality, unspecified trigger  -     levocetirizine (XYZAL) 5 MG tablet; Take 1 tablet by mouth Every Evening.  Dispense: 30 tablet; Refill: 2  -     fluticasone (FLONASE) 50 MCG/ACT nasal spray; 2 sprays into the nostril(s) as directed by provider Daily.  Dispense: 18.2 mL; Refill: 2    7. Acute non-recurrent maxillary sinusitis  -     amoxicillin (AMOXIL) 500 MG capsule; Take 1 capsule by mouth 3 (Three) Times a Day for 7 days.  Dispense: 21 capsule; Refill: 0    8. Primary hypertension  -     lisinopril (PRINIVIL,ZESTRIL) 10 MG tablet; Take 1 tablet by mouth Daily.  Dispense: 30 tablet; Refill: 2    Sxs likely 2/2 sinus infection  Patient's vital signs demonstrate hemodynamic stability  Will start short course of abx   Start fluticasone nasal spray 2 sprays in each nostril daily.   Start oral antihistamine xyzal  Symptomatic treatment  Increase fluid intake  Will restart lisinopril  Counseled patient regarding smoking cessation for > 10 min including:  Asked patient about current tobacco use. he is currently engaged in tobacco use.  Assessed patient's willingness to attempt to quit. At this time patient is willing to attempt to quit tobacco use. Additionally, I assessed behavioral health risks and factors affecting choice of behavior change goals and/or methods.  Advised patient to stop tobacco use by giving clear, specific, and personalized behavior change advise, including verbal discussion and/or distributing written information about personal health harms and benefits.  Assisted patient's efforts in tobacco use cessation through using behavior techniques; aiding patient in achieving agreed upon goals by acquiring the skills, confidence, and social/environmental supports for behavior change; and supplemented with adjunctive medical treatments.  Collaboratively Agreed upon appropriate  treatment goals and methods based on the patient's interest in and willingness to change behavior including:  I advised patient to quit, and offered support.  Discussed current use pattern.  Nicotine patches beginning at 21 mg.  Nicotine gum.  Arranged for follow up contacts to provide ongoing assistance/support and to adjust the treatment as needed, including referral to more intensive or specialized treatment.    Class 2 Severe Obesity (BMI >=35 and <=39.9). Obesity-related health conditions include the following: hypertension. Obesity is newly identified. BMI is is above average; BMI management plan is completed. We discussed low calorie, low carb based diet program, increasing exercise, and Information on healthy weight added to patient's after visit summary.         Discussion Summary:     Discussed plan of care in detail with patient today. Patient was encouraged to keep me informed of any acute changes, lack of improvement, or any new concerning symptoms.  Patient is also aware of reasons to seek emergent care. Patient verbalized understanding and agrees with plan of care.    This visit was billed based on time.  I spent  75  minutes caring for Ozzie Le on this date of service. This time includes time spent by me in the following activities:preparing for the visit, reviewing tests, obtaining and/or reviewing a separately obtained history, performing a medically appropriate examination and/or evaluation , counseling and educating the patient/family/caregiver, ordering medications, tests, or procedures, referring and communicating with other health care professionals , documenting information in the medical record, independently interpreting results and communicating that information with the patient/family/caregiver, and care coordination.    Follow Up  Return in about 1 month (around 1/22/2024).    Please note that portions of this note may have been completed with a voice recognition program.      Sean Fernandez MD, MPH  [facility] [place] [facility] [place]

## 2023-12-28 PROBLEM — I10 PRIMARY HYPERTENSION: Status: ACTIVE | Noted: 2023-12-28

## 2023-12-28 PROBLEM — J30.9 ALLERGIC RHINITIS: Status: ACTIVE | Noted: 2023-12-28

## 2024-01-02 PROBLEM — K57.20 ABSCESS OF SIGMOID COLON DUE TO DIVERTICULITIS: Status: RESOLVED | Noted: 2023-10-30 | Resolved: 2024-01-02

## 2024-01-09 ENCOUNTER — PREP FOR SURGERY (OUTPATIENT)
Dept: OTHER | Facility: HOSPITAL | Age: 34
End: 2024-01-09
Payer: MEDICAID

## 2024-01-09 DIAGNOSIS — K57.32 DIVERTICULITIS OF SIGMOID COLON: Primary | ICD-10-CM

## 2024-01-09 RX ORDER — SODIUM CHLORIDE 0.9 % (FLUSH) 0.9 %
10 SYRINGE (ML) INJECTION EVERY 12 HOURS SCHEDULED
OUTPATIENT
Start: 2024-01-09

## 2024-01-09 RX ORDER — SODIUM CHLORIDE, SODIUM LACTATE, POTASSIUM CHLORIDE, CALCIUM CHLORIDE 600; 310; 30; 20 MG/100ML; MG/100ML; MG/100ML; MG/100ML
50 INJECTION, SOLUTION INTRAVENOUS CONTINUOUS
OUTPATIENT
Start: 2024-01-09

## 2024-01-09 RX ORDER — SODIUM CHLORIDE 9 MG/ML
40 INJECTION, SOLUTION INTRAVENOUS AS NEEDED
OUTPATIENT
Start: 2024-01-09

## 2024-01-09 RX ORDER — SODIUM CHLORIDE 0.9 % (FLUSH) 0.9 %
10 SYRINGE (ML) INJECTION AS NEEDED
OUTPATIENT
Start: 2024-01-09

## 2024-01-10 ENCOUNTER — TELEPHONE (OUTPATIENT)
Dept: SURGERY | Facility: CLINIC | Age: 34
End: 2024-01-10
Payer: MEDICAID

## 2024-01-10 PROBLEM — K57.32 DIVERTICULITIS OF SIGMOID COLON: Status: ACTIVE | Noted: 2024-01-09

## 2024-01-11 NOTE — PROGRESS NOTES
"     Follow Up Therapy Office Visit      Date: 2023     Patient Name: Ozzie Le  : 1990   Time In: 5:15  Time Out:5:53    PCP: Sean Fernandez MD    Chief Complaint:     ICD-10-CM ICD-9-CM   1. Mild episode of recurrent major depressive disorder  F33.0 296.31         History of Present Illness: Ozzie Le is a 33 y.o. male who presents today for follow up therapy session. Patient arrived for session on time, clean and casually dressed without evidence of intoxication, withdrawal, or perceptual disturbance. Patient was cooperative and agreeable to treatment and interacted with therapist.  Patient was seen at the [place] office location.  The patient states, and his wife confirms, that things have been going better with each other.  The patient states they are trying to communicate more.  Patient states that they are going back to court from the disagreement they had a few months ago in which charges were pressed.  The patient needs a letter discussing that they are getting along better\" will be on .  The patient continues to work, and is trying to be the best  and father he can be.  Subjective      Review of Systems:   The following portions of the patient's history were reviewed and updated as appropriate: allergies, current medications, past family history, past medical history, past social history, past surgical history and problem list.    Past Medical History:   Past Medical History:   Diagnosis Date    Abscess of sigmoid colon due to diverticulitis 10/30/2023    Depression     Hypertension     Polycythemia vera     Primary erythrocytosis 2023       Past Surgical History:   Past Surgical History:   Procedure Laterality Date    DENTAL PROCEDURE      HERNIA REPAIR      TONSILLECTOMY         Family History:   Family History   Problem Relation Age of Onset    Cancer Mother     Kidney disease Mother     Heart disease Father     Polycythemia Father        Social " History:   Social History     Socioeconomic History    Marital status: Legally    Tobacco Use    Smoking status: Every Day     Packs/day: 1.00     Years: 15.00     Additional pack years: 0.00     Total pack years: 15.00     Types: Cigarettes    Smokeless tobacco: Never   Vaping Use    Vaping Use: Never used   Substance and Sexual Activity    Alcohol use: Not Currently    Drug use: Never    Sexual activity: Defer       Trauma History:  yes    Spiritual:  unknown    Mental Illness and/or Substance Abuse: The patient has been diagnosed with depression.      History: No    Medication:     Current Outpatient Medications:     Aspirin 81 MG capsule, Take 81 mg by mouth Daily. (Patient not taking: Reported on 1/10/2024), Disp: , Rfl:     fluticasone (FLONASE) 50 MCG/ACT nasal spray, 2 sprays into the nostril(s) as directed by provider Daily. (Patient not taking: Reported on 1/10/2024), Disp: 18.2 mL, Rfl: 2    levocetirizine (XYZAL) 5 MG tablet, Take 1 tablet by mouth Every Evening., Disp: 30 tablet, Rfl: 2    lisinopril (PRINIVIL,ZESTRIL) 10 MG tablet, Take 1 tablet by mouth Daily., Disp: 30 tablet, Rfl: 2    nicotine (Nicotine Step 1) 21 MG/24HR patch, Place 1 patch on the skin as directed by provider Daily for 28 days. (Patient taking differently: Place 1 patch on the skin as directed by provider Daily. Hasn't started yet), Disp: 28 patch, Rfl: 0    nicotine polacrilex (NICORETTE) 4 MG gum, Chew 1 each As Needed for Smoking Cessation. (Patient not taking: Reported on 1/10/2024), Disp: 220 each, Rfl: 1    Allergies:   No Known Allergies    Educational/Work History:  Highest level of education obtained: 12th grade  Employment Status: The patient is currently is unemployed.    Significant Life Events:   Patient been through or witnessed a divorce? no  None.     Patient experienced a death / loss of relationship? yes  The patients dad  from a heart attack in 2020.    Patient experienced a major  accident or tragic events? yes  The patients friend became paralyzed in September 2019.    Patient experienced any other significant life events or trauma (such as verbal, physical, sexual abuse)? no  None.    Legal History:  The patient has no significant history of legal issues.  Court-ordered: No    PHQ-9 Score:   PHQ-9 Total Score: Unknown    YOUNG 7: Total Score: Unknown    Objective     Physical Exam:   There were no vitals taken for this visit. There is no height or weight on file to calculate BMI.     Physical Exam    Patient's Support Network Includes:  wife    Prognosis: Good with Ongoing Treatment     Mental Status Exam:   Hygiene:   good  Cooperation:  Cooperative  Eye Contact:  Good  Psychomotor Behavior:  Appropriate  Affect:  Appropriate  Mood: normal  Hopelessness: Denies  Speech:  Normal  Thought Process:  Goal directed  Thought Content:  Normal  Suicidal:  None  Homicidal:  None  Hallucinations:  None  Delusion:  None  Memory:  Intact  Orientation:  Person , place, time, and situation  Reliability:  good  Insight:  Good  Judgement:  Good  Impulse Control:  Good  Physical/Medical Issues:  No      Assessment / Plan      Assessment/Plan:   Diagnoses and all orders for this visit:    1. Mild episode of recurrent major depressive disorder (Primary)           The therapist will continue to promote the therapeutic alliance, address the patients issues and concerns, strengthen her self awareness, insights, and positive coping skills. Continue to work with the patient on ways to build to his marital relationship.  The therapist encouraged the patient to be aware of any triggers that he might have due to his anxiety/and anger.  TREATMENT PLAN/GOALS: Continue supportive psychotherapy efforts and medications as indicated. Treatment and medication options discussed during today's visit. Patient ackowledged and verbally consented to continue with current treatment plan and was educated on the importance of  compliance with treatment and follow-up appointments.     Counseled patient regarding multimodal approach with healthy nutrition, healthy sleep, regular physical activity, social activities, counseling, and medications.      Coping skills reviewed and encouraged positive framing of thoughts. No suicidal ideation or homicidal ideation at this time.      Assisted patient in processing above session content; acknowledged and normalized patient’s thoughts, feelings, and concerns.  Applied  positive coping skills and behavior management in session.    Allowed patient to freely discuss issues without interruption or judgment. Provided safe, confidential environment to facilitate the development of positive therapeutic relationship and encourage open, honest communication. Assisted patient in identifying risk factors which would indicate the need for higher level of care including thoughts to harm self or others and/or self-harming behavior and encouraged patient to contact this office, call 911, or present to the nearest emergency room should any of these events occur. Discussed crisis intervention services and means to access.     Follow Up:   Return for Recheck.    JOANIE Santos  This document has been electronically signed by JOANIE Santos  January 11, 2024 13:40 EST    Please note that portions of this note were completed with a voice recognition program. Efforts were made to edit dictation, but occasionally words are mistranscribed.

## 2024-01-12 RX ORDER — POLYETHYLENE GLYCOL 3350 17 G/17G
POWDER, FOR SOLUTION ORAL
Qty: 238 G | Refills: 0 | Status: SHIPPED | OUTPATIENT
Start: 2024-01-12

## 2024-01-12 RX ORDER — BISACODYL 5 MG/1
TABLET, DELAYED RELEASE ORAL
Qty: 4 TABLET | Refills: 0 | Status: SHIPPED | OUTPATIENT
Start: 2024-01-12

## 2024-02-02 ENCOUNTER — TELEPHONE (OUTPATIENT)
Dept: SURGERY | Facility: CLINIC | Age: 34
End: 2024-02-02
Payer: MEDICAID

## 2024-02-02 NOTE — TELEPHONE ENCOUNTER
Patient no-showed for a colonoscopy that scheduled on 01/15/2024. I attempted to contact the patient and there was no answer and no voicemail.

## 2024-02-02 NOTE — TELEPHONE ENCOUNTER
Patient returned my call and said that he had canceled due to an upper respiratory infection. The patient has been rescheduled 03/01/2024.

## 2024-02-27 ENCOUNTER — TELEPHONE (OUTPATIENT)
Dept: SURGERY | Facility: CLINIC | Age: 34
End: 2024-02-27
Payer: MEDICAID

## 2024-02-27 NOTE — TELEPHONE ENCOUNTER
Called to confirm procedure scheduled 3/1/2024.  Needs to reschedule.  Please call at [phone].

## 2024-03-25 ENCOUNTER — TELEPHONE (OUTPATIENT)
Dept: SURGERY | Facility: CLINIC | Age: 34
End: 2024-03-25
Payer: MEDICAID

## 2024-03-25 NOTE — TELEPHONE ENCOUNTER
Spoke with patient and he is having to cancel procedure with Dr. Stewart on 03/29 due to transportation. He is going wait for his wife to get her new work schedule and he will call back.

## 2024-04-09 ENCOUNTER — OFFICE VISIT (OUTPATIENT)
Dept: FAMILY MEDICINE CLINIC | Facility: CLINIC | Age: 34
End: 2024-04-09
Payer: MEDICAID

## 2024-04-09 VITALS
BODY MASS INDEX: 39.17 KG/M2 | WEIGHT: 315 LBS | TEMPERATURE: 97.8 F | RESPIRATION RATE: 16 BRPM | OXYGEN SATURATION: 96 % | HEIGHT: 75 IN | HEART RATE: 85 BPM | SYSTOLIC BLOOD PRESSURE: 128 MMHG | DIASTOLIC BLOOD PRESSURE: 78 MMHG

## 2024-04-09 DIAGNOSIS — F17.210 CIGARETTE NICOTINE DEPENDENCE WITHOUT COMPLICATION: ICD-10-CM

## 2024-04-09 DIAGNOSIS — R05.8 OTHER COUGH: ICD-10-CM

## 2024-04-09 DIAGNOSIS — R21 RASH AND OTHER NONSPECIFIC SKIN ERUPTION: ICD-10-CM

## 2024-04-09 DIAGNOSIS — I10 PRIMARY HYPERTENSION: ICD-10-CM

## 2024-04-09 DIAGNOSIS — J45.41 MODERATE PERSISTENT ASTHMA WITH ACUTE EXACERBATION: Primary | ICD-10-CM

## 2024-04-09 DIAGNOSIS — J30.9 ALLERGIC RHINITIS, UNSPECIFIED SEASONALITY, UNSPECIFIED TRIGGER: ICD-10-CM

## 2024-04-09 DIAGNOSIS — R06.83 SNORING: ICD-10-CM

## 2024-04-09 DIAGNOSIS — E66.01 CLASS 3 SEVERE OBESITY DUE TO EXCESS CALORIES WITH SERIOUS COMORBIDITY AND BODY MASS INDEX (BMI) OF 40.0 TO 44.9 IN ADULT: ICD-10-CM

## 2024-04-09 LAB
EXPIRATION DATE: NORMAL
FLUAV AG UPPER RESP QL IA.RAPID: NOT DETECTED
FLUBV AG UPPER RESP QL IA.RAPID: NOT DETECTED
INTERNAL CONTROL: NORMAL
Lab: NORMAL
SARS-COV-2 AG UPPER RESP QL IA.RAPID: NOT DETECTED

## 2024-04-09 PROCEDURE — 87428 SARSCOV & INF VIR A&B AG IA: CPT | Performed by: FAMILY MEDICINE

## 2024-04-09 PROCEDURE — 1159F MED LIST DOCD IN RCRD: CPT | Performed by: FAMILY MEDICINE

## 2024-04-09 PROCEDURE — 3074F SYST BP LT 130 MM HG: CPT | Performed by: FAMILY MEDICINE

## 2024-04-09 PROCEDURE — 99214 OFFICE O/P EST MOD 30 MIN: CPT | Performed by: FAMILY MEDICINE

## 2024-04-09 PROCEDURE — 3078F DIAST BP <80 MM HG: CPT | Performed by: FAMILY MEDICINE

## 2024-04-09 PROCEDURE — 1160F RVW MEDS BY RX/DR IN RCRD: CPT | Performed by: FAMILY MEDICINE

## 2024-04-09 RX ORDER — MONTELUKAST SODIUM 10 MG/1
10 TABLET ORAL NIGHTLY
Qty: 30 TABLET | Refills: 3 | Status: SHIPPED | OUTPATIENT
Start: 2024-04-09

## 2024-04-09 RX ORDER — PREDNISONE 20 MG/1
40 TABLET ORAL DAILY
Qty: 10 TABLET | Refills: 0 | Status: SHIPPED | OUTPATIENT
Start: 2024-04-09 | End: 2024-04-15

## 2024-04-09 RX ORDER — FLUTICASONE PROPIONATE 50 MCG
2 SPRAY, SUSPENSION (ML) NASAL DAILY
Qty: 18.2 ML | Refills: 2 | Status: SHIPPED | OUTPATIENT
Start: 2024-04-09

## 2024-04-09 RX ORDER — LEVOCETIRIZINE DIHYDROCHLORIDE 5 MG/1
5 TABLET, FILM COATED ORAL EVERY EVENING
Qty: 30 TABLET | Refills: 2 | Status: SHIPPED | OUTPATIENT
Start: 2024-04-09

## 2024-04-09 RX ORDER — LISINOPRIL 10 MG/1
10 TABLET ORAL DAILY
Qty: 30 TABLET | Refills: 2 | Status: SHIPPED | OUTPATIENT
Start: 2024-04-09

## 2024-04-09 RX ORDER — LEVALBUTEROL TARTRATE 45 UG/1
1-2 AEROSOL, METERED ORAL EVERY 4 HOURS PRN
Qty: 15 G | Refills: 3 | Status: SHIPPED | OUTPATIENT
Start: 2024-04-09

## 2024-04-09 NOTE — PROGRESS NOTES
"    Office Note     Name: Ozzie Le  : 1990   MRN: [id number]     Chief Complaint:  Cough (Cough has been present for years but worse for the past week), Headache (Pt has had a headache attributes this to HTN), and Hypertension    Subjective     History of Present Illness:  Ozzie Le is a 34 y.o. male who presents today for cough, headache, and hypertension.    Cough  This is a recurrent problem. The current episode started in the past 7 days. The problem has been worse. The problem occurs constantly. The cough is Productive of sputum. Associated symptoms include headaches, nasal congestion, postnasal drip, rhinorrhea, shortness of breath and wheezing. Pertinent negatives include no chest pain, chills, ear congestion, ear pain, fever, heartburn, hemoptysis, myalgias, rash, sore throat, sweats or weight loss. The symptoms are aggravated by dust, pollens and cold air. Risk factors for lung disease include smoking/tobacco exposure. He has tried OTC cough suppressant for the symptoms. The treatment provided no relief. His past medical history is significant for asthma, bronchitis and environmental allergies.        I have reviewed the following portions of the patient's history and these were updated and discussed with the patient as appropriate: past family history, past medical history, past social history, past surgical history, and problem list.     Objective     Vital Signs  /78   Pulse 85   Temp 97.8 °F (36.6 °C) (Oral)   Resp 16   Ht 190.5 cm (75\")   Wt (!) 145 kg (320 lb)   SpO2 96%   BMI 40.00 kg/m²   Estimated body mass index is 40 kg/m² as calculated from the following:    Height as of this encounter: 190.5 cm (75\").    Weight as of this encounter: 145 kg (320 lb).    Physical Exam  Vitals reviewed.   Constitutional:       General: He is not in acute distress.     Appearance: Normal appearance. He is not ill-appearing or toxic-appearing.   HENT:      Head: " Normocephalic.      Right Ear: Tympanic membrane, ear canal and external ear normal.      Left Ear: Tympanic membrane, ear canal and external ear normal.      Nose: Congestion present.      Mouth/Throat:      Mouth: Mucous membranes are moist.      Pharynx: Posterior oropharyngeal erythema present. No oropharyngeal exudate.      Comments: PND noted  Eyes:      Extraocular Movements: Extraocular movements intact.   Cardiovascular:      Rate and Rhythm: Normal rate and regular rhythm.      Heart sounds: Normal heart sounds. No murmur heard.  Pulmonary:      Effort: Pulmonary effort is normal. No respiratory distress.      Breath sounds: No stridor. Wheezing and rhonchi present. No rales.   Chest:      Chest wall: No tenderness.   Musculoskeletal:         General: Normal range of motion.      Cervical back: Normal range of motion and neck supple. No rigidity or tenderness.   Lymphadenopathy:      Cervical: Cervical adenopathy present.   Skin:     General: Skin is warm and dry.   Neurological:      Mental Status: He is alert and oriented to person, place, and time.   Psychiatric:         Mood and Affect: Mood normal.            Assessment and Plan     Diagnoses and all orders for this visit:    1. Moderate persistent asthma with acute exacerbation (Primary)  -     levalbuterol (XOPENEX HFA) 45 MCG/ACT inhaler; Inhale 1-2 puffs Every 4 (Four) Hours As Needed for Wheezing.  Dispense: 15 g; Refill: 3  -     montelukast (Singulair) 10 MG tablet; Take 1 tablet by mouth Every Night.  Dispense: 30 tablet; Refill: 3  -     predniSONE (DELTASONE) 20 MG tablet; Take 2 tablets by mouth Daily for 5 days. (Patient not taking: Reported on 4/15/2024)  Dispense: 10 tablet; Refill: 0    2. Other cough  -     POCT SARS-CoV-2 Antigen DAVINA + Flu    3. Allergic rhinitis, unspecified seasonality, unspecified trigger  -     fluticasone (FLONASE) 50 MCG/ACT nasal spray; 2 sprays into the nostril(s) as directed by provider Daily.  Dispense: 18.2  mL; Refill: 2  -     levocetirizine (XYZAL) 5 MG tablet; Take 1 tablet by mouth Every Evening.  Dispense: 30 tablet; Refill: 2    4. Primary hypertension  -     lisinopril (PRINIVIL,ZESTRIL) 10 MG tablet; Take 1 tablet by mouth Daily.  Dispense: 30 tablet; Refill: 2    5. Snoring  -     Ambulatory Referral to Sleep Medicine    6. Rash and other nonspecific skin eruption    7. Cigarette nicotine dependence without complication    8. Class 3 severe obesity due to excess calories with serious comorbidity and body mass index (BMI) of 40.0 to 44.9 in adult    POC COVID/influenza test is negative  Sxs likely 2/2 asthma exacerbation  Will start short course of oral steroid taper   Will start levalbuterol and singulair  Start fluticasone nasal spray 2 sprays in each nostril daily. May obtain this OTC  Start xyzal  Increase fluid intake  Counseled regarding course of viral illness  HTN well controlled on current medication. VS demonstrate HD stability  Continue lisinopril `10 mg daily. Refill escribed  Patient has snoring with history of BRIGIDA not on CPAP machine.  Referral for sleep study  Patient does have history of eczema.  Counseled regarding keeping area moisturized and using lotions.  Patient counseled on smoking cessation.  Not ready to quit at this time.      Class 3 Severe Obesity (BMI >=40). Obesity-related health conditions include the following: obstructive sleep apnea and hypertension. Obesity is worsening. BMI is is above average; BMI management plan is completed. We discussed low calorie, low carb based diet program, portion control, and increasing exercise.         Discussion Summary:     Discussed plan of care in detail with patient today. Patient was encouraged to keep me informed of any acute changes, lack of improvement, or any new concerning symptoms.  Patient is also aware of reasons to seek emergent care. Patient verbalized understanding and agrees with plan of care.    This visit was billed based on  MDM.    Follow Up  Return in about 1 month (around 5/9/2024) for Annual, recheck asthma.    Please note that portions of this note may have been completed with a voice recognition program.     Sean Fernandez MD, MPH  [facility] [place] [facility] [place]

## 2024-04-15 ENCOUNTER — OFFICE VISIT (OUTPATIENT)
Dept: FAMILY MEDICINE CLINIC | Facility: CLINIC | Age: 34
End: 2024-04-15
Payer: MEDICAID

## 2024-04-15 VITALS
HEART RATE: 86 BPM | TEMPERATURE: 97 F | BODY MASS INDEX: 39.17 KG/M2 | OXYGEN SATURATION: 97 % | WEIGHT: 315 LBS | RESPIRATION RATE: 16 BRPM | DIASTOLIC BLOOD PRESSURE: 82 MMHG | SYSTOLIC BLOOD PRESSURE: 122 MMHG | HEIGHT: 75 IN

## 2024-04-15 DIAGNOSIS — J45.909 REACTIVE AIRWAY DISEASE WITHOUT COMPLICATION, UNSPECIFIED ASTHMA SEVERITY, UNSPECIFIED WHETHER PERSISTENT: ICD-10-CM

## 2024-04-15 DIAGNOSIS — E78.2 MIXED HYPERLIPIDEMIA: ICD-10-CM

## 2024-04-15 DIAGNOSIS — R42 DIZZINESS: Primary | ICD-10-CM

## 2024-04-15 DIAGNOSIS — R42 LIGHTHEADEDNESS: ICD-10-CM

## 2024-04-15 PROCEDURE — 3079F DIAST BP 80-89 MM HG: CPT | Performed by: FAMILY MEDICINE

## 2024-04-15 PROCEDURE — 99215 OFFICE O/P EST HI 40 MIN: CPT | Performed by: FAMILY MEDICINE

## 2024-04-15 PROCEDURE — 3074F SYST BP LT 130 MM HG: CPT | Performed by: FAMILY MEDICINE

## 2024-04-15 PROCEDURE — 1160F RVW MEDS BY RX/DR IN RCRD: CPT | Performed by: FAMILY MEDICINE

## 2024-04-15 PROCEDURE — 1159F MED LIST DOCD IN RCRD: CPT | Performed by: FAMILY MEDICINE

## 2024-04-15 RX ORDER — BUDESONIDE AND FORMOTEROL FUMARATE DIHYDRATE 80; 4.5 UG/1; UG/1
2 AEROSOL RESPIRATORY (INHALATION)
Qty: 10.2 G | Refills: 12 | Status: SHIPPED | OUTPATIENT
Start: 2024-04-15

## 2024-04-15 NOTE — PROGRESS NOTES
"    Office Note     Name: Ozzie Le  : 1990   MRN: [id number]     Chief Complaint:  Shortness of Breath (Pt is still having shortness of breath since last visit. )    Subjective     History of Present Illness:  Ozzie Le is a 34 y.o. male who presents today for SOB. Reports that steroid did not help much. He is using levalbuterol at least 2x per day and continues to have wheeze and SOB. He is a smoker 1ppd x 15 yr. Patient also c/o dizziness and lightheadedness since last visit. He reports it first started when using tizanidine but has continued even after stopping this medication. Dizziness is random and intermittent. Nothing seems to help or worsen it.    I have reviewed the following portions of the patient's history and these were updated and discussed with the patient as appropriate: past family history, past medical history, past social history, past surgical history, and problem list.     Objective     Vital Signs  /82   Pulse 86   Temp 97 °F (36.1 °C) (Temporal)   Resp 16   Ht 190.5 cm (75\")   Wt (!) 145 kg (320 lb)   SpO2 97%   BMI 40.00 kg/m²   Estimated body mass index is 40 kg/m² as calculated from the following:    Height as of this encounter: 190.5 cm (75\").    Weight as of this encounter: 145 kg (320 lb).    Physical Exam  Vitals reviewed.   Constitutional:       General: He is not in acute distress.     Appearance: Normal appearance. He is obese. He is not ill-appearing.   HENT:      Head: Normocephalic.   Eyes:      Extraocular Movements: Extraocular movements intact.      Pupils: Pupils are equal, round, and reactive to light.   Cardiovascular:      Rate and Rhythm: Normal rate and regular rhythm.      Heart sounds: Normal heart sounds. No murmur heard.  Pulmonary:      Effort: Pulmonary effort is normal. No respiratory distress.      Breath sounds: No stridor. Wheezing and rales present. No rhonchi.   Chest:      Chest wall: No tenderness. "   Musculoskeletal:         General: Normal range of motion.      Cervical back: Normal range of motion.   Skin:     General: Skin is warm and dry.   Neurological:      Mental Status: He is alert and oriented to person, place, and time.   Psychiatric:         Mood and Affect: Mood normal.         Behavior: Behavior normal.            Assessment and Plan     Diagnoses and all orders for this visit:    1. Dizziness (Primary)  -     Comprehensive metabolic panel  -     Magnesium  -     TSH Rfx On Abnormal To Free T4    2. Lightheadedness  -     Comprehensive metabolic panel  -     Magnesium  -     CBC Auto Differential  -     TSH Rfx On Abnormal To Free T4    3. Reactive airway disease without complication, unspecified asthma severity, unspecified whether persistent  -     Complete PFT - Pre & Post Bronchodilator; Future  -     budesonide-formoterol (Symbicort) 80-4.5 MCG/ACT inhaler; Inhale 2 puffs 2 (Two) Times a Day.  Dispense: 10.2 g; Refill: 12    4. Mixed hyperlipidemia  -     Lipid panel    VS demonstrate HD stability  Patient reports reduced fluid intake. Dizziness and lightheadedness have been since starting oral corticosteroids  Will start symbicort for RAD and order PFT. May consider pulm referral  Labs as per above             Discussion Summary:     Discussed plan of care in detail with patient today. Patient was encouraged to keep me informed of any acute changes, lack of improvement, or any new concerning symptoms.  Patient is also aware of reasons to seek emergent care. Patient verbalized understanding and agrees with plan of care.    This visit was billed based on time.  I spent 40 minutes caring for Ozzie Le on this date of service. This time includes time spent by me in the following activities:preparing for the visit, reviewing tests, obtaining and/or reviewing a separately obtained history, performing a medically appropriate examination and/or evaluation , counseling and educating the  patient/family/caregiver, ordering medications, tests, or procedures, referring and communicating with other health care professionals , documenting information in the medical record, independently interpreting results and communicating that information with the patient/family/caregiver, and care coordination.    Follow Up  Return if symptoms worsen or fail to improve.    Please note that portions of this note may have been completed with a voice recognition program.     Sean Fernandez MD, MPH  [facility] [place] [facility] [place]

## 2024-04-16 LAB
ALBUMIN SERPL-MCNC: 4 G/DL (ref 3.5–5.2)
ALBUMIN/GLOB SERPL: 1.8 G/DL
ALP SERPL-CCNC: 72 U/L (ref 39–117)
ALT SERPL-CCNC: 27 U/L (ref 1–41)
AST SERPL-CCNC: 16 U/L (ref 1–40)
BASOPHILS # BLD AUTO: 0.08 10*3/MM3 (ref 0–0.2)
BASOPHILS NFR BLD AUTO: 1 % (ref 0–1.5)
BILIRUB SERPL-MCNC: 0.2 MG/DL (ref 0–1.2)
BUN SERPL-MCNC: 15 MG/DL (ref 6–20)
BUN/CREAT SERPL: 16 (ref 7–25)
CALCIUM SERPL-MCNC: 8.9 MG/DL (ref 8.6–10.5)
CHLORIDE SERPL-SCNC: 104 MMOL/L (ref 98–107)
CHOLEST SERPL-MCNC: 139 MG/DL (ref 0–200)
CO2 SERPL-SCNC: 25.6 MMOL/L (ref 22–29)
CREAT SERPL-MCNC: 0.94 MG/DL (ref 0.76–1.27)
EGFRCR SERPLBLD CKD-EPI 2021: 109.8 ML/MIN/1.73
EOSINOPHIL # BLD AUTO: 0.21 10*3/MM3 (ref 0–0.4)
EOSINOPHIL NFR BLD AUTO: 2.6 % (ref 0.3–6.2)
ERYTHROCYTE [DISTWIDTH] IN BLOOD BY AUTOMATED COUNT: 13 % (ref 12.3–15.4)
GLOBULIN SER CALC-MCNC: 2.2 GM/DL
GLUCOSE SERPL-MCNC: 99 MG/DL (ref 65–99)
HCT VFR BLD AUTO: 46.9 % (ref 37.5–51)
HDLC SERPL-MCNC: 28 MG/DL (ref 40–60)
HGB BLD-MCNC: 15.2 G/DL (ref 13–17.7)
IMM GRANULOCYTES # BLD AUTO: 0.02 10*3/MM3 (ref 0–0.05)
IMM GRANULOCYTES NFR BLD AUTO: 0.2 % (ref 0–0.5)
LDLC SERPL CALC-MCNC: 68 MG/DL (ref 0–100)
LYMPHOCYTES # BLD AUTO: 1.81 10*3/MM3 (ref 0.7–3.1)
LYMPHOCYTES NFR BLD AUTO: 22.5 % (ref 19.6–45.3)
MAGNESIUM SERPL-MCNC: 2 MG/DL (ref 1.6–2.6)
MCH RBC QN AUTO: 26.4 PG (ref 26.6–33)
MCHC RBC AUTO-ENTMCNC: 32.4 G/DL (ref 31.5–35.7)
MCV RBC AUTO: 81.4 FL (ref 79–97)
MONOCYTES # BLD AUTO: 0.67 10*3/MM3 (ref 0.1–0.9)
MONOCYTES NFR BLD AUTO: 8.3 % (ref 5–12)
NEUTROPHILS # BLD AUTO: 5.25 10*3/MM3 (ref 1.7–7)
NEUTROPHILS NFR BLD AUTO: 65.4 % (ref 42.7–76)
NRBC BLD AUTO-RTO: 0 /100 WBC (ref 0–0.2)
PLATELET # BLD AUTO: 204 10*3/MM3 (ref 140–450)
POTASSIUM SERPL-SCNC: 4.2 MMOL/L (ref 3.5–5.2)
PROT SERPL-MCNC: 6.2 G/DL (ref 6–8.5)
RBC # BLD AUTO: 5.76 10*6/MM3 (ref 4.14–5.8)
SODIUM SERPL-SCNC: 140 MMOL/L (ref 136–145)
TRIGL SERPL-MCNC: 263 MG/DL (ref 0–150)
TSH SERPL DL<=0.005 MIU/L-ACNC: 1.47 UIU/ML (ref 0.27–4.2)
VLDLC SERPL CALC-MCNC: 43 MG/DL (ref 5–40)
WBC # BLD AUTO: 8.04 10*3/MM3 (ref 3.4–10.8)

## 2024-04-19 ENCOUNTER — TELEPHONE (OUTPATIENT)
Dept: FAMILY MEDICINE CLINIC | Facility: CLINIC | Age: 34
End: 2024-04-19
Payer: MEDICAID

## 2024-04-19 NOTE — TELEPHONE ENCOUNTER
Caller: Ozzie Le    Relationship: Self    Best call back number: [phone]     What orders are you requesting (i.e. lab or imaging): BLOOD PRESSURE MACHINE     Additional notes: PATIENT STATES HIS MACHINE STOPPED WORKING A COUPLE OF DAYS AGO AND HE THREW IT AWAY. HE STATES HE CANNOT AFFORD TO BUY A NEW ONE AND WAS REQUESTING ORDERS TO BE PLACED FOR INSURANCE TO COVER A NEW ONE.     PLEASE CALL PATIENT BACK, IF NO ANSWER LEAVE A DETAILED MESSAGE.

## 2024-04-22 ENCOUNTER — HOSPITAL ENCOUNTER (OUTPATIENT)
Dept: PULMONOLOGY | Facility: HOSPITAL | Age: 34
Discharge: HOME | End: 2024-04-22
Admitting: FAMILY MEDICINE
Payer: MEDICAID

## 2024-04-22 DIAGNOSIS — J45.909 REACTIVE AIRWAY DISEASE WITHOUT COMPLICATION, UNSPECIFIED ASTHMA SEVERITY, UNSPECIFIED WHETHER PERSISTENT: ICD-10-CM

## 2024-04-22 PROCEDURE — 94729 DIFFUSING CAPACITY: CPT

## 2024-04-22 PROCEDURE — 94010 BREATHING CAPACITY TEST: CPT

## 2024-04-22 PROCEDURE — 94726 PLETHYSMOGRAPHY LUNG VOLUMES: CPT

## 2024-04-22 PROCEDURE — 94726 PLETHYSMOGRAPHY LUNG VOLUMES: CPT | Performed by: INTERNAL MEDICINE

## 2024-04-22 PROCEDURE — 94729 DIFFUSING CAPACITY: CPT | Performed by: INTERNAL MEDICINE

## 2024-04-22 PROCEDURE — 94010 BREATHING CAPACITY TEST: CPT | Performed by: INTERNAL MEDICINE

## 2024-04-22 RX ORDER — BLOOD-GLUCOSE METER
1 EACH MISCELLANEOUS ONCE
Qty: 1 EACH | Refills: 0 | Status: SHIPPED | OUTPATIENT
Start: 2024-04-22 | End: 2024-04-22

## 2024-04-25 PROBLEM — R06.83 SNORING: Status: ACTIVE | Noted: 2024-04-25

## 2024-04-25 PROBLEM — J45.41 MODERATE PERSISTENT ASTHMA WITH ACUTE EXACERBATION: Status: ACTIVE | Noted: 2024-04-25

## 2024-04-25 PROBLEM — E66.813 CLASS 3 SEVERE OBESITY DUE TO EXCESS CALORIES WITH SERIOUS COMORBIDITY AND BODY MASS INDEX (BMI) OF 40.0 TO 44.9 IN ADULT: Status: ACTIVE | Noted: 2023-03-27

## 2024-05-02 ENCOUNTER — OFFICE VISIT (OUTPATIENT)
Dept: FAMILY MEDICINE CLINIC | Facility: CLINIC | Age: 34
End: 2024-05-02
Payer: MEDICAID

## 2024-05-02 VITALS
RESPIRATION RATE: 16 BRPM | DIASTOLIC BLOOD PRESSURE: 82 MMHG | WEIGHT: 314.2 LBS | HEIGHT: 75 IN | HEART RATE: 95 BPM | BODY MASS INDEX: 39.07 KG/M2 | SYSTOLIC BLOOD PRESSURE: 124 MMHG | TEMPERATURE: 98.1 F | OXYGEN SATURATION: 95 %

## 2024-05-02 DIAGNOSIS — J45.41 MODERATE PERSISTENT ASTHMA WITH ACUTE EXACERBATION: ICD-10-CM

## 2024-05-02 DIAGNOSIS — G47.33 OSA (OBSTRUCTIVE SLEEP APNEA): ICD-10-CM

## 2024-05-02 DIAGNOSIS — E78.1 HYPERTRIGLYCERIDEMIA: ICD-10-CM

## 2024-05-02 DIAGNOSIS — E66.01 CLASS 2 SEVERE OBESITY DUE TO EXCESS CALORIES WITH SERIOUS COMORBIDITY AND BODY MASS INDEX (BMI) OF 39.0 TO 39.9 IN ADULT: ICD-10-CM

## 2024-05-02 DIAGNOSIS — Z00.00 WELL ADULT EXAM: Primary | ICD-10-CM

## 2024-05-02 DIAGNOSIS — J30.9 CHRONIC ALLERGIC RHINITIS: ICD-10-CM

## 2024-05-02 DIAGNOSIS — Z23 NEED FOR VACCINATION: ICD-10-CM

## 2024-05-02 PROCEDURE — 99395 PREV VISIT EST AGE 18-39: CPT | Performed by: FAMILY MEDICINE

## 2024-05-02 PROCEDURE — 90677 PCV20 VACCINE IM: CPT | Performed by: FAMILY MEDICINE

## 2024-05-02 PROCEDURE — 1126F AMNT PAIN NOTED NONE PRSNT: CPT | Performed by: FAMILY MEDICINE

## 2024-05-02 PROCEDURE — 2014F MENTAL STATUS ASSESS: CPT | Performed by: FAMILY MEDICINE

## 2024-05-02 PROCEDURE — 90471 IMMUNIZATION ADMIN: CPT | Performed by: FAMILY MEDICINE

## 2024-05-02 PROCEDURE — 3074F SYST BP LT 130 MM HG: CPT | Performed by: FAMILY MEDICINE

## 2024-05-02 PROCEDURE — 1160F RVW MEDS BY RX/DR IN RCRD: CPT | Performed by: FAMILY MEDICINE

## 2024-05-02 PROCEDURE — 1159F MED LIST DOCD IN RCRD: CPT | Performed by: FAMILY MEDICINE

## 2024-05-02 PROCEDURE — 3079F DIAST BP 80-89 MM HG: CPT | Performed by: FAMILY MEDICINE

## 2024-05-02 RX ORDER — TRIAMCINOLONE ACETONIDE 55 UG/1
2 SPRAY, METERED NASAL DAILY
Qty: 16.9 G | Refills: 11 | Status: SHIPPED | OUTPATIENT
Start: 2024-05-02

## 2024-05-02 RX ORDER — FENOFIBRATE 48 MG/1
48 TABLET, COATED ORAL DAILY
Qty: 30 TABLET | Refills: 3 | Status: SHIPPED | OUTPATIENT
Start: 2024-05-02

## 2024-05-02 RX ORDER — METHYLPREDNISOLONE 4 MG
TABLET, DOSE PACK ORAL SEE ADMIN INSTRUCTIONS
COMMUNITY
Start: 2024-04-29 | End: 2024-05-06

## 2024-05-02 RX ORDER — AZITHROMYCIN 250 MG/1
TABLET, FILM COATED ORAL TAKE AS DIRECTED
COMMUNITY
Start: 2024-04-29

## 2024-05-08 PROBLEM — E78.1 HYPERTRIGLYCERIDEMIA: Status: ACTIVE | Noted: 2024-05-08

## 2024-05-09 ENCOUNTER — OFFICE VISIT (OUTPATIENT)
Dept: PULMONOLOGY | Facility: CLINIC | Age: 34
End: 2024-05-09
Payer: MEDICAID

## 2024-05-09 ENCOUNTER — PATIENT ROUNDING (BHMG ONLY) (OUTPATIENT)
Dept: PULMONOLOGY | Facility: CLINIC | Age: 34
End: 2024-05-09
Payer: MEDICAID

## 2024-05-09 VITALS
OXYGEN SATURATION: 95 % | BODY MASS INDEX: 39.04 KG/M2 | RESPIRATION RATE: 18 BRPM | SYSTOLIC BLOOD PRESSURE: 124 MMHG | HEIGHT: 75 IN | WEIGHT: 314 LBS | HEART RATE: 98 BPM | DIASTOLIC BLOOD PRESSURE: 76 MMHG

## 2024-05-09 DIAGNOSIS — G47.33 OBSTRUCTIVE SLEEP APNEA: Primary | ICD-10-CM

## 2024-05-09 DIAGNOSIS — G47.19 EXCESSIVE DAYTIME SLEEPINESS: ICD-10-CM

## 2024-05-09 DIAGNOSIS — F17.210 NICOTINE DEPENDENCE, CIGARETTES, UNCOMPLICATED: ICD-10-CM

## 2024-05-09 DIAGNOSIS — E66.9 OBESITY (BMI 30-39.9): ICD-10-CM

## 2024-05-09 DIAGNOSIS — J45.40 MODERATE PERSISTENT ASTHMA WITHOUT COMPLICATION: ICD-10-CM

## 2024-05-09 DIAGNOSIS — R06.83 SNORING: ICD-10-CM

## 2024-06-03 ENCOUNTER — HOSPITAL ENCOUNTER (OUTPATIENT)
Dept: SLEEP MEDICINE | Facility: HOSPITAL | Age: 34
Discharge: HOME | End: 2024-06-03
Admitting: INTERNAL MEDICINE
Payer: MEDICAID

## 2024-06-03 DIAGNOSIS — G47.33 OBSTRUCTIVE SLEEP APNEA: ICD-10-CM

## 2024-06-03 DIAGNOSIS — R06.83 SNORING: ICD-10-CM

## 2024-06-03 DIAGNOSIS — G47.19 EXCESSIVE DAYTIME SLEEPINESS: ICD-10-CM

## 2024-06-03 DIAGNOSIS — E66.9 OBESITY (BMI 30-39.9): ICD-10-CM

## 2024-06-03 PROCEDURE — 95806 SLEEP STUDY UNATT&RESP EFFT: CPT

## 2024-06-24 DIAGNOSIS — G47.33 OSA (OBSTRUCTIVE SLEEP APNEA): Primary | ICD-10-CM

## 2024-07-03 ENCOUNTER — OFFICE VISIT (OUTPATIENT)
Dept: BEHAVIORAL HEALTH | Facility: CLINIC | Age: 34
End: 2024-07-03
Payer: MEDICAID

## 2024-07-03 DIAGNOSIS — F43.9 TRAUMA AND STRESSOR-RELATED DISORDER: ICD-10-CM

## 2024-07-03 DIAGNOSIS — F33.0 MILD EPISODE OF RECURRENT MAJOR DEPRESSIVE DISORDER: Primary | ICD-10-CM

## 2024-07-03 NOTE — PROGRESS NOTES
"     Follow Up Therapy Office Visit      Date: 2024     Patient Name: Ozzie Le  : 1990   Time In: 1:05  Time Out:1:50    PCP: Sean Fernandez MD    Chief Complaint:     ICD-10-CM ICD-9-CM   1. Mild episode of recurrent major depressive disorder  F33.0 296.31   2. Trauma and stressor-related disorder  F43.9 309.81     308.9           History of Present Illness: Ozzie Le is a 34 y.o. male who presents today for follow up therapy session. Patient arrived for session on time, clean and casually dressed without evidence of intoxication, withdrawal, or perceptual disturbance. Patient was cooperative and agreeable to treatment and interacted with therapist.  Patient was seen at the [place] office location.  The patient and his wife came to the appointment today.  The patient and his wife were in disagreements today regarding the behavior that they had towards each other.  The patient's wife discussed that the patient constantly accuses her of \"cheating\" when she has a job, so she cannot keep a job.  The patient discussed that he is frustrated because financially they are in a difficult situation and everything costs so much.  Discussed the need for both of them to move forward and stop living in the past.  They both agree that they want to stay  and they discussed that they want to work on the marriage.  The patient's wife discussed the importance of them both bettering themselves so they can make more money and get better jobs.  The patient discussed that he would love to work in [employer] shop, but physically and medically he would not be able to do that he has that type of cancer.  The patient's wife wants to get a certification in billing and coding and work from home.    Subjective      Review of Systems:   The following portions of the patient's history were reviewed and updated as appropriate: allergies, current medications, past family history, past medical history, past " social history, past surgical history and problem list.    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       Past Surgical History:   Past Surgical History:   Procedure Laterality Date    DENTAL PROCEDURE      HERNIA REPAIR      TONSILLECTOMY         Family History:   Family History   Problem Relation Age of Onset    Cancer Mother     Kidney disease Mother     Heart disease Father     Polycythemia Father        Social History:   Social History     Socioeconomic History    Marital status: Legally    Tobacco Use    Smoking status: Every Day     Current packs/day: 1.00     Average packs/day: 1 pack/day for 15.0 years (15.0 ttl pk-yrs)     Types: Cigarettes    Smokeless tobacco: Never   Vaping Use    Vaping status: Never Used   Substance and Sexual Activity    Alcohol use: Not Currently    Drug use: Never    Sexual activity: Defer       Trauma History:  yes    Spiritual:  unknown    Mental Illness and/or Substance Abuse: The patient has been diagnosed with depression.      History: No    Medication:     Current Outpatient Medications:     azithromycin (ZITHROMAX) 250 MG tablet, Take  by mouth Take As Directed. (Patient not taking: Reported on 5/9/2024), Disp: , Rfl:     bisacodyl 5 MG EC tablet, Use as directed. (Patient not taking: Reported on 4/9/2024), Disp: 4 tablet, Rfl: 0    fenofibrate (Tricor) 48 MG tablet, Take 1 tablet by mouth Daily. (Patient not taking: Reported on 5/9/2024), Disp: 30 tablet, Rfl: 3    Fluticasone-Umeclidin-Vilant (TRELEGY ELLIPTA) 200-62.5-25 MCG/ACT inhaler, Inhale 1 puff Daily. Rinse mouth with water after use., Disp: 60 each, Rfl: 5    levalbuterol (XOPENEX HFA) 45 MCG/ACT inhaler, Inhale 1-2 puffs Every 4 (Four) Hours As Needed for Wheezing. (Patient not taking: Reported on 5/2/2024), Disp: 15 g, Rfl:  3    levocetirizine (XYZAL) 5 MG tablet, Take 1 tablet by mouth Every Evening., Disp: 30 tablet, Rfl: 2    lisinopril (PRINIVIL,ZESTRIL) 10 MG tablet, Take 1 tablet by mouth Daily., Disp: 30 tablet, Rfl: 2    montelukast (Singulair) 10 MG tablet, Take 1 tablet by mouth Every Night., Disp: 30 tablet, Rfl: 3    nicotine polacrilex (NICORETTE) 4 MG gum, Chew 1 each As Needed for Smoking Cessation. (Patient not taking: Reported on 1/10/2024), Disp: 220 each, Rfl: 1    polyethylene glycol (MIRALAX) 17 GM/SCOOP powder, Mix 238g powder with 64 oz of clear liquid. Use as directed. (Patient not taking: Reported on 2024), Disp: 238 g, Rfl: 0    Triamcinolone Acetonide (NASACORT) 55 MCG/ACT nasal inhaler, 2 sprays into the nostril(s) as directed by provider Daily., Disp: 16.9 g, Rfl: 11    Allergies:   No Known Allergies    Educational/Work History:  Highest level of education obtained: 12th grade  Employment Status: The patient is currently is unemployed.    Significant Life Events:   Patient been through or witnessed a divorce? no  None.     Patient experienced a death / loss of relationship? yes  The patients dad  from a heart attack in 2020.    Patient experienced a major accident or tragic events? yes  The patients friend became paralyzed in 2019.    Patient experienced any other significant life events or trauma (such as verbal, physical, sexual abuse)? no  None.    Legal History:  The patient has no significant history of legal issues.  Court-ordered: No    PHQ-9 Score:   PHQ-9 Total Score: 0    YOUNG 7: Total Score: 3    Objective     Physical Exam:   There were no vitals taken for this visit. There is no height or weight on file to calculate BMI.     Physical Exam    Patient's Support Network Includes:  wife    Prognosis: Good with Ongoing Treatment     Mental Status Exam:   Hygiene:   good  Cooperation:  Cooperative  Eye Contact:  Good  Psychomotor Behavior:  Appropriate  Affect:   frustrated  Mood: quiet/angry  Hopelessness: Denies  Speech:  Normal  Thought Process:  Goal directed  Thought Content:  Normal  Suicidal:  None  Homicidal:  None  Hallucinations:  None  Delusion:  None  Memory:  Intact  Orientation:  Person , place, time, and situation  Reliability:  good  Insight:  Good  Judgement:  Good  Impulse Control:  Good  Physical/Medical Issues:  No      Assessment / Plan      Assessment/Plan:   Diagnoses and all orders for this visit:    1. Mild episode of recurrent major depressive disorder (Primary)    2. Trauma and stressor-related disorder             The therapist will continue to promote the therapeutic alliance, address the patients issues and concerns, strengthen her self awareness, insights, and positive coping skills. Continue to work with the patient on ways to build to his marital relationship.  The therapist encouraged the patient and his wife understand and act on things that they can control and they cannot act on the things that they cannot control.    TREATMENT PLAN/GOALS: Continue supportive psychotherapy efforts and medications as indicated. Treatment and medication options discussed during today's visit. Patient ackowledged and verbally consented to continue with current treatment plan and was educated on the importance of compliance with treatment and follow-up appointments.     Counseled patient regarding multimodal approach with healthy nutrition, healthy sleep, regular physical activity, social activities, counseling, and medications.      Coping skills reviewed and encouraged positive framing of thoughts. No suicidal ideation or homicidal ideation at this time.      Assisted patient in processing above session content; acknowledged and normalized patient’s thoughts, feelings, and concerns.  Applied  positive coping skills and behavior management in session.    Allowed patient to freely discuss issues without interruption or judgment. Provided safe, confidential  environment to facilitate the development of positive therapeutic relationship and encourage open, honest communication. Assisted patient in identifying risk factors which would indicate the need for higher level of care including thoughts to harm self or others and/or self-harming behavior and encouraged patient to contact this office, call 911, or present to the nearest emergency room should any of these events occur. Discussed crisis intervention services and means to access.     Follow Up:   No follow-ups on file.    JOANIE Santos  This document has been electronically signed by JOANIE Santos  July 3, 2024 16:24 EDT    Please note that portions of this note were completed with a voice recognition program. Efforts were made to edit dictation, but occasionally words are mistranscribed.

## 2024-08-07 ENCOUNTER — OFFICE VISIT (OUTPATIENT)
Dept: BEHAVIORAL HEALTH | Facility: CLINIC | Age: 34
End: 2024-08-07
Payer: MEDICAID

## 2024-08-07 DIAGNOSIS — F43.9 TRAUMA AND STRESSOR-RELATED DISORDER: ICD-10-CM

## 2024-08-07 DIAGNOSIS — F33.0 MILD EPISODE OF RECURRENT MAJOR DEPRESSIVE DISORDER: Primary | ICD-10-CM

## 2024-08-07 PROCEDURE — 90834 PSYTX W PT 45 MINUTES: CPT | Performed by: COUNSELOR

## 2024-08-07 NOTE — PROGRESS NOTES
Follow Up Therapy Office Visit      Date: 2024     Patient Name: Ozzie Le  : 1990   Time In: 2:45  Time Out:3:28    PCP: Sean Fernandez MD    Chief Complaint:     ICD-10-CM ICD-9-CM   1. Mild episode of recurrent major depressive disorder  F33.0 296.31   2. Trauma and stressor-related disorder  F43.9 309.81     308.9             History of Present Illness: Ozzie Le is a 34 y.o. male who presents today for follow up therapy session. Patient arrived for session on time, clean and casually dressed without evidence of intoxication, withdrawal, or perceptual disturbance. Patient was cooperative and agreeable to treatment and interacted with therapist.  Patient was seen at the [place] office location.  The patient discusses frustration with his wife regarding his medical diagnosis that he just found out today.  The patient went to see a doctor today about his legs and the pain that he has in them, but ended up talking about his heart and some of the chest pains that he has been having.  The patient discussed that his wife is focusing more on the leg pain rather than the heart.  Also the patient discussed his concern about his wife wants him to get help with his anger, but she does not leave him alone and continuously wants to get the last word in, and will not let him calm down.  The patient also explained that it does not help that he is not getting good sleep because he has a CPAP now, and it continuously dries his mouth out.    Subjective      Review of Systems:   The following portions of the patient's history were reviewed and updated as appropriate: allergies, current medications, past family history, past medical history, past social history, past surgical history and problem list.    Past Medical History:   Past Medical History:   Diagnosis Date    Abscess of sigmoid colon due to diverticulitis 10/30/2023    Depression     Hypertension     Polycythemia vera     Primary  erythrocytosis 04/27/2023       Past Surgical History:   Past Surgical History:   Procedure Laterality Date    DENTAL PROCEDURE      HERNIA REPAIR      TONSILLECTOMY         Family History:   Family History   Problem Relation Age of Onset    Cancer Mother     Kidney disease Mother     Heart disease Father     Polycythemia Father        Social History:   Social History     Socioeconomic History    Marital status: Legally    Tobacco Use    Smoking status: Every Day     Current packs/day: 1.00     Average packs/day: 1 pack/day for 15.0 years (15.0 ttl pk-yrs)     Types: Cigarettes    Smokeless tobacco: Never   Vaping Use    Vaping status: Never Used   Substance and Sexual Activity    Alcohol use: Not Currently    Drug use: Never    Sexual activity: Defer       Trauma History:  yes    Spiritual:  unknown    Mental Illness and/or Substance Abuse: The patient has been diagnosed with depression.      History: No    Medication:     Current Outpatient Medications:     azithromycin (ZITHROMAX) 250 MG tablet, Take  by mouth Take As Directed. (Patient not taking: Reported on 5/9/2024), Disp: , Rfl:     bisacodyl 5 MG EC tablet, Use as directed. (Patient not taking: Reported on 4/9/2024), Disp: 4 tablet, Rfl: 0    fenofibrate (Tricor) 48 MG tablet, Take 1 tablet by mouth Daily. (Patient not taking: Reported on 5/9/2024), Disp: 30 tablet, Rfl: 3    Fluticasone-Umeclidin-Vilant (TRELEGY ELLIPTA) 200-62.5-25 MCG/ACT inhaler, Inhale 1 puff Daily. Rinse mouth with water after use., Disp: 60 each, Rfl: 5    levalbuterol (XOPENEX HFA) 45 MCG/ACT inhaler, Inhale 1-2 puffs Every 4 (Four) Hours As Needed for Wheezing. (Patient not taking: Reported on 5/2/2024), Disp: 15 g, Rfl: 3    levocetirizine (XYZAL) 5 MG tablet, Take 1 tablet by mouth Every Evening., Disp: 30 tablet, Rfl: 2    lisinopril (PRINIVIL,ZESTRIL) 10 MG tablet, Take 1 tablet by mouth Daily., Disp: 30 tablet, Rfl: 2    montelukast (Singulair) 10 MG tablet,  Take 1 tablet by mouth Every Night., Disp: 30 tablet, Rfl: 3    nicotine polacrilex (NICORETTE) 4 MG gum, Chew 1 each As Needed for Smoking Cessation. (Patient not taking: Reported on 1/10/2024), Disp: 220 each, Rfl: 1    polyethylene glycol (MIRALAX) 17 GM/SCOOP powder, Mix 238g powder with 64 oz of clear liquid. Use as directed. (Patient not taking: Reported on 2024), Disp: 238 g, Rfl: 0    Triamcinolone Acetonide (NASACORT) 55 MCG/ACT nasal inhaler, 2 sprays into the nostril(s) as directed by provider Daily., Disp: 16.9 g, Rfl: 11    Allergies:   No Known Allergies    Educational/Work History:  Highest level of education obtained: 12th grade  Employment Status: The patient is currently is unemployed.    Significant Life Events:   Patient been through or witnessed a divorce? no  None.     Patient experienced a death / loss of relationship? yes  The patients dad  from a heart attack in 2020.    Patient experienced a major accident or tragic events? yes  The patients friend became paralyzed in 2019.    Patient experienced any other significant life events or trauma (such as verbal, physical, sexual abuse)? no  None.    Legal History:  The patient has no significant history of legal issues.  Court-ordered: No    PHQ-9 Score:   PHQ-9 Total Score: 6    YOUNG 7: Total Score: 6    Objective     Physical Exam:   There were no vitals taken for this visit. There is no height or weight on file to calculate BMI.     Physical Exam    Patient's Support Network Includes:  wife    Prognosis: Good with Ongoing Treatment     Mental Status Exam:   Hygiene:   good  Cooperation:  Cooperative  Eye Contact:  Good  Psychomotor Behavior:  Appropriate  Affect:  frustrated  Mood: quiet/angry  Hopelessness: Denies  Speech:  Normal  Thought Process:  Goal directed  Thought Content:  Normal  Suicidal:  None  Homicidal:  None  Hallucinations:  None  Delusion:  None  Memory:  Intact  Orientation:  Person , place, time,  and situation  Reliability:  good  Insight:  Good  Judgement:  Good  Impulse Control:  Good  Physical/Medical Issues:  No    Nothing has changed  Assessment / Plan      Assessment/Plan:   Diagnoses and all orders for this visit:    1. Mild episode of recurrent major depressive disorder (Primary)    2. Trauma and stressor-related disorder               The therapist will continue to promote the therapeutic alliance, address the patients issues and concerns, strengthen her self awareness, insights, and positive coping skills. Continue to work with the patient on ways to build to his marital relationship.  The therapist encouraged the patient and his wife understand and act on things that they can control and they cannot act on the things that they cannot control.  Discussed the patient's ability to recognize when he gets angry, and what he needs to do after recognizing his anger.  The patient will talk with his wife and have her come in for the next appointment to discuss with the patient we will be doing when he gets angry.    TREATMENT PLAN/GOALS: Continue supportive psychotherapy efforts and medications as indicated. Treatment and medication options discussed during today's visit. Patient ackowledged and verbally consented to continue with current treatment plan and was educated on the importance of compliance with treatment and follow-up appointments.     Counseled patient regarding multimodal approach with healthy nutrition, healthy sleep, regular physical activity, social activities, counseling, and medications.      Coping skills reviewed and encouraged positive framing of thoughts. No suicidal ideation or homicidal ideation at this time.      Assisted patient in processing above session content; acknowledged and normalized patient’s thoughts, feelings, and concerns.  Applied  positive coping skills and behavior management in session.    Allowed patient to freely discuss issues without interruption or judgment.  Provided safe, confidential environment to facilitate the development of positive therapeutic relationship and encourage open, honest communication. Assisted patient in identifying risk factors which would indicate the need for higher level of care including thoughts to harm self or others and/or self-harming behavior and encouraged patient to contact this office, call 911, or present to the nearest emergency room should any of these events occur. Discussed crisis intervention services and means to access.     Follow Up:   No follow-ups on file.    JOANIE Santos  This document has been electronically signed by JOANIE Santos  August 7, 2024 17:22 EDT    Please note that portions of this note were completed with a voice recognition program. Efforts were made to edit dictation, but occasionally words are mistranscribed.

## 2024-09-30 ENCOUNTER — OFFICE VISIT (OUTPATIENT)
Dept: BEHAVIORAL HEALTH | Facility: CLINIC | Age: 34
End: 2024-09-30
Payer: MEDICAID

## 2024-09-30 DIAGNOSIS — F33.0 MILD EPISODE OF RECURRENT MAJOR DEPRESSIVE DISORDER: Primary | ICD-10-CM

## 2024-09-30 DIAGNOSIS — F43.9 TRAUMA AND STRESSOR-RELATED DISORDER: ICD-10-CM

## 2024-09-30 DIAGNOSIS — R45.4 ANGER: ICD-10-CM

## 2024-09-30 PROCEDURE — 90834 PSYTX W PT 45 MINUTES: CPT | Performed by: COUNSELOR

## 2024-09-30 NOTE — PROGRESS NOTES
"     Follow Up Therapy Office Visit      Date: 2024     Patient Name: Ozzie Le  : 1990   Time In: 12:55  Time Out:1:39    PCP: Sean Fernandez MD    Chief Complaint:     ICD-10-CM ICD-9-CM   1. Mild episode of recurrent major depressive disorder  F33.0 296.31   2. Trauma and stressor-related disorder  F43.9 309.81     308.9   3. Anger  R45.4 799.29               History of Present Illness: Ozzie Le is a 34 y.o. male who presents today for follow up therapy session. Patient arrived for session on time, clean and casually dressed without evidence of intoxication, withdrawal, or perceptual disturbance. Patient was cooperative and agreeable to treatment and interacted with therapist.  Patient was seen at the [place] office location.  The patient states that he is not working right now, and he is concerned and worried about his heart and issues that may have.  The patient's next heart appointment is on the , and they will be running more tests.  The patient states his wife will be starting to work on Wednesday as a cook in a nursing home.  The patient admits that to being very hard to have her with him together , so he will be glad that she is working.  The patient states that they have their ups and downs as far as getting along but on  was her wedding anniversary, and the patient forgot.  This made his wife very upset.  The patient states that his wife knows how to \"push his buttons\", and states that he is under a lot of \"stress\", and it was very difficult for him to remember certain days.  The patient states that the anger issues are still there with his wife.    Subjective      Review of Systems:   The following portions of the patient's history were reviewed and updated as appropriate: allergies, current medications, past family history, past medical history, past social history, past surgical history and problem list.    Past Medical History:   Past " Medical History:   Diagnosis Date    Abscess of sigmoid colon due to diverticulitis 10/30/2023    Depression     Hypertension     Polycythemia vera     Primary erythrocytosis 04/27/2023       Past Surgical History:   Past Surgical History:   Procedure Laterality Date    DENTAL PROCEDURE      HERNIA REPAIR      TONSILLECTOMY         Family History:   Family History   Problem Relation Age of Onset    Cancer Mother     Kidney disease Mother     Heart disease Father     Polycythemia Father        Social History:   Social History     Socioeconomic History    Marital status: Legally    Tobacco Use    Smoking status: Every Day     Current packs/day: 1.00     Average packs/day: 1 pack/day for 15.0 years (15.0 ttl pk-yrs)     Types: Cigarettes    Smokeless tobacco: Never   Vaping Use    Vaping status: Never Used   Substance and Sexual Activity    Alcohol use: Not Currently    Drug use: Never    Sexual activity: Defer       Trauma History:  yes    Spiritual:  unknown    Mental Illness and/or Substance Abuse: The patient has been diagnosed with depression.      History: No    Medication:     Current Outpatient Medications:     azithromycin (ZITHROMAX) 250 MG tablet, Take  by mouth Take As Directed. (Patient not taking: Reported on 5/9/2024), Disp: , Rfl:     bisacodyl 5 MG EC tablet, Use as directed. (Patient not taking: Reported on 4/9/2024), Disp: 4 tablet, Rfl: 0    fenofibrate (Tricor) 48 MG tablet, Take 1 tablet by mouth Daily. (Patient not taking: Reported on 5/9/2024), Disp: 30 tablet, Rfl: 3    Fluticasone-Umeclidin-Vilant (TRELEGY ELLIPTA) 200-62.5-25 MCG/ACT inhaler, Inhale 1 puff Daily. Rinse mouth with water after use., Disp: 60 each, Rfl: 5    levalbuterol (XOPENEX HFA) 45 MCG/ACT inhaler, Inhale 1-2 puffs Every 4 (Four) Hours As Needed for Wheezing. (Patient not taking: Reported on 5/2/2024), Disp: 15 g, Rfl: 3    levocetirizine (XYZAL) 5 MG tablet, Take 1 tablet by mouth Every Evening., Disp: 30  tablet, Rfl: 2    lisinopril (PRINIVIL,ZESTRIL) 10 MG tablet, Take 1 tablet by mouth Daily., Disp: 30 tablet, Rfl: 2    montelukast (Singulair) 10 MG tablet, Take 1 tablet by mouth Every Night., Disp: 30 tablet, Rfl: 3    nicotine polacrilex (NICORETTE) 4 MG gum, Chew 1 each As Needed for Smoking Cessation. (Patient not taking: Reported on 1/10/2024), Disp: 220 each, Rfl: 1    polyethylene glycol (MIRALAX) 17 GM/SCOOP powder, Mix 238g powder with 64 oz of clear liquid. Use as directed. (Patient not taking: Reported on 2024), Disp: 238 g, Rfl: 0    Triamcinolone Acetonide (NASACORT) 55 MCG/ACT nasal inhaler, 2 sprays into the nostril(s) as directed by provider Daily., Disp: 16.9 g, Rfl: 11    Allergies:   No Known Allergies    Educational/Work History:  Highest level of education obtained: 12th grade  Employment Status: The patient is currently is unemployed.    Significant Life Events:   Patient been through or witnessed a divorce? no  None.     Patient experienced a death / loss of relationship? yes  The patients dad  from a heart attack in 2020.    Patient experienced a major accident or tragic events? yes  The patients friend became paralyzed in 2019.    Patient experienced any other significant life events or trauma (such as verbal, physical, sexual abuse)? no  None.    Legal History:  The patient has no significant history of legal issues.  Court-ordered: No    PHQ-9 Score:   PHQ-9 Total Score: 3    YOUNG 7: Total Score: 1    Objective     Physical Exam:   There were no vitals taken for this visit. There is no height or weight on file to calculate BMI.     Physical Exam    Patient's Support Network Includes:  wife    Prognosis: Good with Ongoing Treatment     Mental Status Exam:   Hygiene:   good  Cooperation:  Cooperative  Eye Contact:  Good  Psychomotor Behavior:  Appropriate  Affect: Appropriate  Mood: Normal  Hopelessness: Denies  Speech:  Normal  Thought Process:  Goal  directed  Thought Content:  Normal  Suicidal:  None  Homicidal:  None  Hallucinations:  None  Delusion:  None  Memory:  Intact  Orientation:  Person , place, time, and situation  Reliability:  good  Insight:  Good  Judgement:  Good  Impulse Control:  Good  Physical/Medical Issues:  No      Assessment / Plan      Assessment/Plan:   Diagnoses and all orders for this visit:    1. Mild episode of recurrent major depressive disorder (Primary)    2. Trauma and stressor-related disorder    3. Anger                 The therapist will continue to promote the therapeutic alliance, address the patients issues and concerns, strengthen her self awareness, insights, and positive coping skills. Continue to work with the patient on ways to build to his marital relationship.  The therapist encouraged the patient to utilize anger management techniques, and how he needs to give himself a timeout to calm down.    TREATMENT PLAN/GOALS: Continue supportive psychotherapy efforts and medications as indicated. Treatment and medication options discussed during today's visit. Patient ackowledged and verbally consented to continue with current treatment plan and was educated on the importance of compliance with treatment and follow-up appointments.     Counseled patient regarding multimodal approach with healthy nutrition, healthy sleep, regular physical activity, social activities, counseling, and medications.      Coping skills reviewed and encouraged positive framing of thoughts. No suicidal ideation or homicidal ideation at this time.      Assisted patient in processing above session content; acknowledged and normalized patient’s thoughts, feelings, and concerns.  Applied  positive coping skills and behavior management in session.    Allowed patient to freely discuss issues without interruption or judgment. Provided safe, confidential environment to facilitate the development of positive therapeutic relationship and encourage open, honest  communication. Assisted patient in identifying risk factors which would indicate the need for higher level of care including thoughts to harm self or others and/or self-harming behavior and encouraged patient to contact this office, call 911, or present to the nearest emergency room should any of these events occur. Discussed crisis intervention services and means to access.     Follow Up:   No follow-ups on file.    JOANIE Santos  This document has been electronically signed by JOANIE Santos  September 30, 2024 15:23 EDT    Please note that portions of this note were completed with a voice recognition program. Efforts were made to edit dictation, but occasionally words are mistranscribed.

## 2024-10-30 ENCOUNTER — OFFICE VISIT (OUTPATIENT)
Dept: BEHAVIORAL HEALTH | Facility: CLINIC | Age: 34
End: 2024-10-30
Payer: MEDICAID

## 2024-10-30 DIAGNOSIS — R45.4 ANGER: ICD-10-CM

## 2024-10-30 DIAGNOSIS — F33.0 MILD EPISODE OF RECURRENT MAJOR DEPRESSIVE DISORDER: Primary | ICD-10-CM

## 2024-10-31 NOTE — PROGRESS NOTES
Follow Up Therapy Office Visit      Date: 10/30/2024     Patient Name: Ozzie Le  : 1990   Time In: 1:07  Time Out:1:40    PCP: Sean Fernandez MD    Chief Complaint:     ICD-10-CM ICD-9-CM   1. Mild episode of recurrent major depressive disorder  F33.0 296.31   2. Anger  R45.4 799.29                 History of Present Illness: Ozzie Le is a 34 y.o. male who presents today for follow up therapy session. Patient arrived for session on time, clean and casually dressed without evidence of intoxication, withdrawal, or perceptual disturbance. Patient was cooperative and agreeable to treatment and interacted with therapist.  Patient was seen at the [place] office location.  States that things with his wife are up and down.  The patient is still concerned about problems that he is having with his heart.  Patient states that he went to his first got cardiologist and they sent him to a specialist.  The specialist said there was nothing wrong.  The patient is confused and used wants to get another answer with her it is good or bad.  The patient states that he is having a lot of physical difficulties and he is hurting a lot in his legs and body.  The patient states that he has been hard to get quiet when he gets angry so he does not get loud and say things that he does not want to say.    Subjective      Review of Systems:   The following portions of the patient's history were reviewed and updated as appropriate: allergies, current medications, past family history, past medical history, past social history, past surgical history and problem list.    Past Medical History:   Past Medical History:   Diagnosis Date    Abscess of sigmoid colon due to diverticulitis 10/30/2023    Depression     Hypertension     Polycythemia vera     Primary erythrocytosis 2023       Past Surgical History:   Past Surgical History:   Procedure Laterality Date    DENTAL PROCEDURE      HERNIA REPAIR       TONSILLECTOMY         Family History:   Family History   Problem Relation Age of Onset    Cancer Mother     Kidney disease Mother     Heart disease Father     Polycythemia Father        Social History:   Social History     Socioeconomic History    Marital status: Legally    Tobacco Use    Smoking status: Every Day     Current packs/day: 1.00     Average packs/day: 1 pack/day for 15.0 years (15.0 ttl pk-yrs)     Types: Cigarettes    Smokeless tobacco: Never   Vaping Use    Vaping status: Never Used   Substance and Sexual Activity    Alcohol use: Not Currently    Drug use: Never    Sexual activity: Defer       Trauma History:  yes    Spiritual:  unknown    Mental Illness and/or Substance Abuse: The patient has been diagnosed with depression.      History: No    Medication:     Current Outpatient Medications:     azithromycin (ZITHROMAX) 250 MG tablet, Take  by mouth Take As Directed. (Patient not taking: Reported on 5/9/2024), Disp: , Rfl:     bisacodyl 5 MG EC tablet, Use as directed. (Patient not taking: Reported on 4/9/2024), Disp: 4 tablet, Rfl: 0    fenofibrate (Tricor) 48 MG tablet, Take 1 tablet by mouth Daily. (Patient not taking: Reported on 5/9/2024), Disp: 30 tablet, Rfl: 3    Fluticasone-Umeclidin-Vilant (TRELEGY ELLIPTA) 200-62.5-25 MCG/ACT inhaler, Inhale 1 puff Daily. Rinse mouth with water after use., Disp: 60 each, Rfl: 5    levalbuterol (XOPENEX HFA) 45 MCG/ACT inhaler, Inhale 1-2 puffs Every 4 (Four) Hours As Needed for Wheezing. (Patient not taking: Reported on 5/2/2024), Disp: 15 g, Rfl: 3    levocetirizine (XYZAL) 5 MG tablet, Take 1 tablet by mouth Every Evening., Disp: 30 tablet, Rfl: 2    lisinopril (PRINIVIL,ZESTRIL) 10 MG tablet, Take 1 tablet by mouth Daily., Disp: 30 tablet, Rfl: 2    montelukast (Singulair) 10 MG tablet, Take 1 tablet by mouth Every Night., Disp: 30 tablet, Rfl: 3    nicotine polacrilex (NICORETTE) 4 MG gum, Chew 1 each As Needed for Smoking Cessation.  (Patient not taking: Reported on 1/10/2024), Disp: 220 each, Rfl: 1    polyethylene glycol (MIRALAX) 17 GM/SCOOP powder, Mix 238g powder with 64 oz of clear liquid. Use as directed. (Patient not taking: Reported on 2024), Disp: 238 g, Rfl: 0    Triamcinolone Acetonide (NASACORT) 55 MCG/ACT nasal inhaler, 2 sprays into the nostril(s) as directed by provider Daily., Disp: 16.9 g, Rfl: 11    Allergies:   No Known Allergies    Educational/Work History:  Highest level of education obtained: 12th grade  Employment Status: The patient is currently is unemployed.    Significant Life Events:   Patient been through or witnessed a divorce? no  None.     Patient experienced a death / loss of relationship? yes  The patients dad  from a heart attack in 2020.    Patient experienced a major accident or tragic events? yes  The patients friend became paralyzed in 2019.    Patient experienced any other significant life events or trauma (such as verbal, physical, sexual abuse)? no  None.    Legal History:  The patient has no significant history of legal issues.  Court-ordered: No    PHQ-9 Score:   PHQ-9 Total Score: 2    YOUNG 7: Total Score: 2    Objective     Physical Exam:   There were no vitals taken for this visit. There is no height or weight on file to calculate BMI.     Physical Exam    Patient's Support Network Includes:  wife    Prognosis: Good with Ongoing Treatment     Mental Status Exam:   Hygiene:   good  Cooperation:  Cooperative  Eye Contact:  Good  Psychomotor Behavior:  Appropriate  Affect: Appropriate  Mood: Normal  Hopelessness: Denies  Speech:  Normal  Thought Process:  Goal directed  Thought Content:  Normal  Suicidal:  None  Homicidal:  None  Hallucinations:  None  Delusion:  None  Memory:  Intact  Orientation:  Person , place, time, and situation  Reliability:  good  Insight:  Good  Judgement:  Good  Impulse Control:  Good  Physical/Medical Issues:  No      Assessment / Plan       Assessment/Plan:   Diagnoses and all orders for this visit:    1. Mild episode of recurrent major depressive disorder (Primary)    2. Anger          The therapist will continue to promote the therapeutic alliance, address the patients issues and concerns, strengthen her self awareness, insights, and positive coping skills. Continue to work with the patient on ways to build to his marital relationship.  The therapist encouraged the patient to separate himself if he can when he gets angry, and also to get an appointment with his primary care doctor to see if they could possibly make another referral to a cardiologist to get a third answer.    TREATMENT PLAN/GOALS: Continue supportive psychotherapy efforts and medications as indicated. Treatment and medication options discussed during today's visit. Patient ackowledged and verbally consented to continue with current treatment plan and was educated on the importance of compliance with treatment and follow-up appointments.     Counseled patient regarding multimodal approach with healthy nutrition, healthy sleep, regular physical activity, social activities, counseling, and medications.      Coping skills reviewed and encouraged positive framing of thoughts. No suicidal ideation or homicidal ideation at this time.      Assisted patient in processing above session content; acknowledged and normalized patient’s thoughts, feelings, and concerns.  Applied  positive coping skills and behavior management in session.    Allowed patient to freely discuss issues without interruption or judgment. Provided safe, confidential environment to facilitate the development of positive therapeutic relationship and encourage open, honest communication. Assisted patient in identifying risk factors which would indicate the need for higher level of care including thoughts to harm self or others and/or self-harming behavior and encouraged patient to contact this office, call 911, or present  to the nearest emergency room should any of these events occur. Discussed crisis intervention services and means to access.     Follow Up:   No follow-ups on file.    JOANIE Santos  This document has been electronically signed by JOANIE Santos  October 31, 2024 08:56 EDT    Please note that portions of this note were completed with a voice recognition program. Efforts were made to edit dictation, but occasionally words are mistranscribed.

## 2024-11-01 ENCOUNTER — OFFICE VISIT (OUTPATIENT)
Dept: PULMONOLOGY | Facility: CLINIC | Age: 34
End: 2024-11-01
Payer: MEDICAID

## 2024-11-01 VITALS
WEIGHT: 315 LBS | DIASTOLIC BLOOD PRESSURE: 74 MMHG | RESPIRATION RATE: 18 BRPM | OXYGEN SATURATION: 97 % | BODY MASS INDEX: 39.17 KG/M2 | HEART RATE: 86 BPM | SYSTOLIC BLOOD PRESSURE: 144 MMHG | HEIGHT: 75 IN

## 2024-11-01 DIAGNOSIS — E66.01 MORBID OBESITY WITH BMI OF 40.0-44.9, ADULT: ICD-10-CM

## 2024-11-01 DIAGNOSIS — G47.33 OSA (OBSTRUCTIVE SLEEP APNEA): Primary | ICD-10-CM

## 2024-11-01 DIAGNOSIS — J45.40 MODERATE PERSISTENT ASTHMA WITHOUT COMPLICATION: ICD-10-CM

## 2024-11-01 DIAGNOSIS — F17.210 NICOTINE DEPENDENCE, CIGARETTES, UNCOMPLICATED: ICD-10-CM

## 2024-11-01 PROCEDURE — 3077F SYST BP >= 140 MM HG: CPT | Performed by: NURSE PRACTITIONER

## 2024-11-01 PROCEDURE — 3078F DIAST BP <80 MM HG: CPT | Performed by: NURSE PRACTITIONER

## 2024-11-01 PROCEDURE — 99214 OFFICE O/P EST MOD 30 MIN: CPT | Performed by: NURSE PRACTITIONER

## 2024-11-01 RX ORDER — CLOPIDOGREL BISULFATE 75 MG/1
75 TABLET ORAL DAILY
COMMUNITY
Start: 2024-09-26 | End: 2024-11-01

## 2024-11-01 NOTE — PROGRESS NOTES
"Chief Complaint   Patient presents with    Sleeping Problem    Follow-up         Subjective   Ozzie Le is a 34 y.o. male.   The patient comes in today for follow-up of obstructive sleep apnea and asthma    I reviewed his sleep study and discussed the results with him today.  The sleep study revealed mild sleep apnea with an apnea hypopnea index of 14 per hour.      He has been set up with AutoPAP at a pressure of 6-14.  He has not been using machine the last month due to dry mouth. He tried increasing humiditiy and then water gets in tubing and causes a clicking noise that wakes him.     He states the DME tells him his mask is leaking. He is using a FFM.     He was treated for sinus infection 3 months ago but no respiratory illness since then.    He uses Trelegy some days because he forgets. His breathing has improved since his last visit.     He continues to smoke 1 ppd cigarettes.      The following portions of the patient's history were reviewed and updated as appropriate: allergies, current medications, past family history, past medical history, past social history, and past surgical history.      Review of Systems   HENT:  Positive for sinus pressure. Negative for sneezing and sore throat.    Respiratory:  Positive for cough and chest tightness. Negative for shortness of breath and wheezing (sometimes).        Objective   Visit Vitals  /74   Pulse 86   Resp 18   Ht 190.5 cm (75\")   Wt (!) 147 kg (325 lb)   SpO2 97%   BMI 40.62 kg/m²       Physical Exam  Vitals reviewed.   HENT:      Head: Atraumatic.      Mouth/Throat:      Mouth: Mucous membranes are moist.      Comments: Crowded oropharynx.  Eyes:      Extraocular Movements: Extraocular movements intact.   Cardiovascular:      Rate and Rhythm: Normal rate and regular rhythm.   Pulmonary:      Effort: Pulmonary effort is normal. No respiratory distress.      Comments: Somewhat decreased air entry without wheezing.  Skin:     General: Skin is " warm.   Neurological:      Mental Status: He is alert and oriented to person, place, and time.         Assessment & Plan   Diagnoses and all orders for this visit:    1. BRIGIDA (obstructive sleep apnea) (Primary)  -     BIPAP / CPAP Adjustment    2. Nicotine dependence, cigarettes, uncomplicated    3. Moderate persistent asthma without complication    4. Morbid obesity with BMI of 40.0-44.9, adult    Other orders  -     Fluticasone-Umeclidin-Vilant (TRELEGY ELLIPTA) 200-62.5-25 MCG/ACT inhaler; Inhale 1 puff Daily. Rinse mouth with water after use.  Dispense: 60 each; Refill: 5           Return for keep appt in May.    DISCUSSION (if any):  Spirometry today consistent with moderate obstruction.    His symptoms of asthma are under adequate control at this time.  I have advised him to use Trelegy on a daily basis.  Maybe he should use the inhaler first thing in the morning prior to brushing his teeth so that he will remember to use it every day.    He should continue the rescue inhaler as needed for shortness of breath and wheezing.    Patient's medications for underlying asthma were reviewed with him in great detail.    Any needed adjustments to his pulmonary medications, either for clinical or insurance coverage reasons, have been made and are reflected in the orders.    Side effects of prescribed medications discussed with the patient.    Asthma action plan with discussed with him.    The patient was asked to call this office if the symptoms worsen.     I have advised him to stop smoking.    Per report he has not been using CPAP machine for the last month.     He seems to have issues with the CPAP mask as well as drying out of his mouth and throat.  I have ordered heated tubing to see if this helps.  He has tried adjusting the humidity and cannot seem to find the perfect setting.    Unfortunately, patient is here for 31-90 day compliance but I have not received compliance from DME.     Patient has no primary care at  this time.      Dictated utilizing Dragon dictation.    This document was electronically signed by AKI Greenfield November 4, 2024  15:54 EST

## 2024-11-09 ENCOUNTER — HOSPITAL ENCOUNTER (EMERGENCY)
Facility: HOSPITAL | Age: 34
Discharge: HOME | End: 2024-11-09
Attending: EMERGENCY MEDICINE
Payer: MEDICAID

## 2024-11-09 ENCOUNTER — APPOINTMENT (OUTPATIENT)
Dept: CT IMAGING | Facility: HOSPITAL | Age: 34
End: 2024-11-09
Payer: MEDICAID

## 2024-11-09 VITALS
TEMPERATURE: 97.9 F | WEIGHT: 315 LBS | HEIGHT: 75 IN | OXYGEN SATURATION: 95 % | RESPIRATION RATE: 19 BRPM | DIASTOLIC BLOOD PRESSURE: 71 MMHG | BODY MASS INDEX: 39.17 KG/M2 | HEART RATE: 103 BPM | SYSTOLIC BLOOD PRESSURE: 122 MMHG

## 2024-11-09 DIAGNOSIS — K57.32 SIGMOID DIVERTICULITIS: Primary | ICD-10-CM

## 2024-11-09 LAB
ALBUMIN SERPL-MCNC: 4.3 G/DL (ref 3.5–5.2)
ALBUMIN/GLOB SERPL: 1.7 G/DL
ALP SERPL-CCNC: 87 U/L (ref 39–117)
ALT SERPL W P-5'-P-CCNC: 29 U/L (ref 1–41)
ANION GAP SERPL CALCULATED.3IONS-SCNC: 12.7 MMOL/L (ref 5–15)
AST SERPL-CCNC: 18 U/L (ref 1–40)
BASOPHILS # BLD AUTO: 0.06 10*3/MM3 (ref 0–0.2)
BASOPHILS NFR BLD AUTO: 0.3 % (ref 0–1.5)
BILIRUB SERPL-MCNC: 0.4 MG/DL (ref 0–1.2)
BILIRUB UR QL STRIP: NEGATIVE
BUN SERPL-MCNC: 16 MG/DL (ref 6–20)
BUN/CREAT SERPL: 14.5 (ref 7–25)
CALCIUM SPEC-SCNC: 8.8 MG/DL (ref 8.6–10.5)
CHLORIDE SERPL-SCNC: 104 MMOL/L (ref 98–107)
CLARITY UR: CLEAR
CO2 SERPL-SCNC: 21.3 MMOL/L (ref 22–29)
COLOR UR: YELLOW
CREAT SERPL-MCNC: 1.1 MG/DL (ref 0.76–1.27)
DEPRECATED RDW RBC AUTO: 38.4 FL (ref 37–54)
EGFRCR SERPLBLD CKD-EPI 2021: 90.9 ML/MIN/1.73
EOSINOPHIL # BLD AUTO: 0.06 10*3/MM3 (ref 0–0.4)
EOSINOPHIL NFR BLD AUTO: 0.3 % (ref 0.3–6.2)
ERYTHROCYTE [DISTWIDTH] IN BLOOD BY AUTOMATED COUNT: 13.2 % (ref 12.3–15.4)
GLOBULIN UR ELPH-MCNC: 2.6 GM/DL
GLUCOSE SERPL-MCNC: 156 MG/DL (ref 65–99)
GLUCOSE UR STRIP-MCNC: NEGATIVE MG/DL
HCT VFR BLD AUTO: 50.5 % (ref 37.5–51)
HGB BLD-MCNC: 17 G/DL (ref 13–17.7)
HGB UR QL STRIP.AUTO: NEGATIVE
HOLD SPECIMEN: NORMAL
HOLD SPECIMEN: NORMAL
IMM GRANULOCYTES # BLD AUTO: 0.09 10*3/MM3 (ref 0–0.05)
IMM GRANULOCYTES NFR BLD AUTO: 0.5 % (ref 0–0.5)
KETONES UR QL STRIP: NEGATIVE
LEUKOCYTE ESTERASE UR QL STRIP.AUTO: NEGATIVE
LIPASE SERPL-CCNC: 20 U/L (ref 13–60)
LYMPHOCYTES # BLD AUTO: 1.22 10*3/MM3 (ref 0.7–3.1)
LYMPHOCYTES NFR BLD AUTO: 6.3 % (ref 19.6–45.3)
MCH RBC QN AUTO: 27.4 PG (ref 26.6–33)
MCHC RBC AUTO-ENTMCNC: 33.7 G/DL (ref 31.5–35.7)
MCV RBC AUTO: 81.5 FL (ref 79–97)
MONOCYTES # BLD AUTO: 1.16 10*3/MM3 (ref 0.1–0.9)
MONOCYTES NFR BLD AUTO: 6 % (ref 5–12)
NEUTROPHILS NFR BLD AUTO: 16.69 10*3/MM3 (ref 1.7–7)
NEUTROPHILS NFR BLD AUTO: 86.6 % (ref 42.7–76)
NITRITE UR QL STRIP: NEGATIVE
NRBC BLD AUTO-RTO: 0 /100 WBC (ref 0–0.2)
PH UR STRIP.AUTO: 5.5 [PH] (ref 5–8)
PLATELET # BLD AUTO: 205 10*3/MM3 (ref 140–450)
PMV BLD AUTO: 10.8 FL (ref 6–12)
POTASSIUM SERPL-SCNC: 4.2 MMOL/L (ref 3.5–5.2)
PROT SERPL-MCNC: 6.9 G/DL (ref 6–8.5)
PROT UR QL STRIP: NEGATIVE
RBC # BLD AUTO: 6.2 10*6/MM3 (ref 4.14–5.8)
SODIUM SERPL-SCNC: 138 MMOL/L (ref 136–145)
SP GR UR STRIP: 1.02 (ref 1–1.03)
UROBILINOGEN UR QL STRIP: NORMAL
WBC NRBC COR # BLD AUTO: 19.28 10*3/MM3 (ref 3.4–10.8)
WHOLE BLOOD HOLD COAG: NORMAL
WHOLE BLOOD HOLD SPECIMEN: NORMAL

## 2024-11-09 PROCEDURE — 83690 ASSAY OF LIPASE: CPT | Performed by: EMERGENCY MEDICINE

## 2024-11-09 PROCEDURE — 96374 THER/PROPH/DIAG INJ IV PUSH: CPT

## 2024-11-09 PROCEDURE — 85025 COMPLETE CBC W/AUTO DIFF WBC: CPT | Performed by: EMERGENCY MEDICINE

## 2024-11-09 PROCEDURE — 25510000001 IOPAMIDOL 61 % SOLUTION: Performed by: EMERGENCY MEDICINE

## 2024-11-09 PROCEDURE — 25010000002 KETOROLAC TROMETHAMINE PER 15 MG: Performed by: NURSE PRACTITIONER

## 2024-11-09 PROCEDURE — 80053 COMPREHEN METABOLIC PANEL: CPT | Performed by: EMERGENCY MEDICINE

## 2024-11-09 PROCEDURE — 74177 CT ABD & PELVIS W/CONTRAST: CPT

## 2024-11-09 PROCEDURE — 99285 EMERGENCY DEPT VISIT HI MDM: CPT | Performed by: EMERGENCY MEDICINE

## 2024-11-09 PROCEDURE — 81003 URINALYSIS AUTO W/O SCOPE: CPT | Performed by: EMERGENCY MEDICINE

## 2024-11-09 RX ORDER — SODIUM CHLORIDE 0.9 % (FLUSH) 0.9 %
10 SYRINGE (ML) INJECTION AS NEEDED
Status: DISCONTINUED | OUTPATIENT
Start: 2024-11-09 | End: 2024-11-09 | Stop reason: HOSPADM

## 2024-11-09 RX ORDER — IOPAMIDOL 612 MG/ML
100 INJECTION, SOLUTION INTRAVASCULAR
Status: COMPLETED | OUTPATIENT
Start: 2024-11-09 | End: 2024-11-09

## 2024-11-09 RX ORDER — CLOPIDOGREL BISULFATE 75 MG/1
75 TABLET ORAL DAILY
COMMUNITY

## 2024-11-09 RX ORDER — KETOROLAC TROMETHAMINE 30 MG/ML
30 INJECTION, SOLUTION INTRAMUSCULAR; INTRAVENOUS ONCE
Status: COMPLETED | OUTPATIENT
Start: 2024-11-09 | End: 2024-11-09

## 2024-11-09 RX ADMIN — KETOROLAC TROMETHAMINE 30 MG: 30 INJECTION, SOLUTION INTRAMUSCULAR; INTRAVENOUS at 13:30

## 2024-11-09 RX ADMIN — IOPAMIDOL 100 ML: 612 INJECTION, SOLUTION INTRAVENOUS at 13:04

## 2024-11-09 NOTE — ED PROVIDER NOTES
"Subjective:  History of Present Illness:    Patient is a 34-year-old male with history of sigmoid diverticulitis.  Presents to the ER today with lower abdominal pain that started last night.  He denies fever.  Denies changes in bowel habit.  Denies dysuria frequency or hematuria.  Denies OTC medication home remedy.  Denies alleviating or exacerbating factors.    Nurses Notes reviewed and agree, including vitals, allergies, social history and prior medical history.     REVIEW OF SYSTEMS: All systems reviewed and not pertinent unless noted.  Review of Systems   Gastrointestinal:  Positive for abdominal pain.   All other systems reviewed and are negative.      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      Past Surgical History:   Procedure Laterality Date    DENTAL PROCEDURE      HERNIA REPAIR      TONSILLECTOMY           Social History     Socioeconomic History    Marital status: Legally    Tobacco Use    Smoking status: Every Day     Current packs/day: 1.00     Average packs/day: 1 pack/day for 15.0 years (15.0 ttl pk-yrs)     Types: Cigarettes    Smokeless tobacco: Never   Vaping Use    Vaping status: Never Used   Substance and Sexual Activity    Alcohol use: Not Currently    Drug use: Never    Sexual activity: Defer         Family History   Problem Relation Age of Onset    Cancer Mother     Kidney disease Mother     Heart disease Father     Polycythemia Father        Objective  Physical Exam:  /71   Pulse 103   Temp 97.9 °F (36.6 °C) (Oral)   Resp 19   Ht 190.5 cm (75\")   Wt (!) 143 kg (315 lb)   SpO2 95%   BMI 39.37 kg/m²      Physical Exam  Vitals and nursing note reviewed.   Constitutional:       Appearance: He is well-developed and normal weight.   HENT:      Head: " Normocephalic and atraumatic.      Mouth/Throat:      Mouth: Mucous membranes are moist.      Pharynx: Oropharynx is clear.   Eyes:      Extraocular Movements: Extraocular movements intact.      Pupils: Pupils are equal, round, and reactive to light.   Cardiovascular:      Rate and Rhythm: Normal rate and regular rhythm.   Pulmonary:      Effort: Pulmonary effort is normal.      Breath sounds: Normal breath sounds.   Abdominal:      General: Bowel sounds are normal.      Palpations: Abdomen is soft.      Tenderness:  in the periumbilical area   Skin:     General: Skin is warm.      Capillary Refill: Capillary refill takes less than 2 seconds.   Neurological:      General: No focal deficit present.      Mental Status: He is alert and oriented to person, place, and time.   Psychiatric:         Mood and Affect: Mood normal.         Behavior: Behavior normal.         Procedures    ED Course:         Lab Results (last 24 hours)       Procedure Component Value Units Date/Time    CBC & Differential [[id number]]  (Abnormal) Collected: 11/09/24 1209    Specimen: Blood Updated: 11/09/24 1218    Narrative:      The following orders were created for panel order CBC & Differential.  Procedure                               Abnormality         Status                     ---------                               -----------         ------                     CBC Auto Differential[[id number]]        Abnormal            Final result                 Please view results for these tests on the individual orders.    Comprehensive Metabolic Panel [[id number]]  (Abnormal) Collected: 11/09/24 1209    Specimen: Blood Updated: 11/09/24 1239     Glucose 156 mg/dL      BUN 16 mg/dL      Creatinine 1.10 mg/dL      Sodium 138 mmol/L      Potassium 4.2 mmol/L      Chloride 104 mmol/L      CO2 21.3 mmol/L      Calcium 8.8 mg/dL      Total Protein 6.9 g/dL      Albumin 4.3 g/dL      ALT (SGPT) 29 U/L      AST (SGOT) 18 U/L      Alkaline Phosphatase 87  U/L      Total Bilirubin 0.4 mg/dL      Globulin 2.6 gm/dL      A/G Ratio 1.7 g/dL      BUN/Creatinine Ratio 14.5     Anion Gap 12.7 mmol/L      eGFR 90.9 mL/min/1.73     Narrative:      GFR Normal >60  Chronic Kidney Disease <60  Kidney Failure <15      Lipase [[id number]]  (Normal) Collected: 11/09/24 1209    Specimen: Blood Updated: 11/09/24 1239     Lipase 20 U/L     CBC Auto Differential [[id number]]  (Abnormal) Collected: 11/09/24 1209    Specimen: Blood Updated: 11/09/24 1218     WBC 19.28 10*3/mm3      RBC 6.20 10*6/mm3      Hemoglobin 17.0 g/dL      Hematocrit 50.5 %      MCV 81.5 fL      MCH 27.4 pg      MCHC 33.7 g/dL      RDW 13.2 %      RDW-SD 38.4 fl      MPV 10.8 fL      Platelets 205 10*3/mm3      Neutrophil % 86.6 %      Lymphocyte % 6.3 %      Monocyte % 6.0 %      Eosinophil % 0.3 %      Basophil % 0.3 %      Immature Grans % 0.5 %      Neutrophils, Absolute 16.69 10*3/mm3      Lymphocytes, Absolute 1.22 10*3/mm3      Monocytes, Absolute 1.16 10*3/mm3      Eosinophils, Absolute 0.06 10*3/mm3      Basophils, Absolute 0.06 10*3/mm3      Immature Grans, Absolute 0.09 10*3/mm3      nRBC 0.0 /100 WBC     Urinalysis With Microscopic If Indicated (No Culture) - Urine, Clean Catch [[id number]]  (Normal) Collected: 11/09/24 1226    Specimen: Urine, Clean Catch Updated: 11/09/24 1231     Color, UA Yellow     Appearance, UA Clear     pH, UA 5.5     Specific Gravity, UA 1.025     Glucose, UA Negative     Ketones, UA Negative     Bilirubin, UA Negative     Blood, UA Negative     Protein, UA Negative     Leuk Esterase, UA Negative     Nitrite, UA Negative     Urobilinogen, UA 0.2 E.U./dL    Narrative:      Urine microscopic not indicated.             CT Abdomen Pelvis With Contrast    Result Date: 11/9/2024  PROCEDURE: CT ABDOMEN PELVIS W CONTRAST-  HISTORY: Lower abdominal pain  Comparison: December 4, 2023  FINDINGS: Axial CT images of the abdomen and pelvis were obtained with IV contrast only. Coronal  reformatted images were also obtained. This study was performed with techniques to keep radiation doses as low as reasonably achievable, (ALARA). Individualized dose reduction techniques using automated exposure control or adjustment of mA and/or kV according to the patient size were employed.  The lung bases are clear. The liver has an unremarkable appearance, without evidence of mass. The gallbladder appears normal without evidence of gallstones. There is no evidence of biliary ductal dilatation. The pancreas appears normal. The spleen size is within normal limits. There is no evidence of renal mass or hydronephrosis. There is no evidence of adenopathy. No abnormal fluid collection is seen. No localized inflammatory process is identified.  Images of the pelvis reveal scattered sigmoid diverticula. There our inflammatory changes adjacent to the sigmoid colon consistent with acute diverticulitis. A small amount of pelvic free fluid is seen which is likely reactive. There is no evidence of abscess or bowel obstruction. The appendix is unremarkable.        Impression: Acute sigmoid diverticulitis.  Small amount of pelvic free fluid, likely reactive.  No evidence of abscess or bowel obstruction.      CTDI: 16.46 mGy DLP:916.32 mGy.cm  This report was signed and finalized on 11/9/2024 1:31 PM by Palomo Leal MD.          MDM      Initial impression of presenting illness: Patient is a 34-year-old male with history of sigmoid diverticulitis.  Presents to the ER today with lower abdominal pain that started last night.  He denies fever.  Denies changes in bowel habit.  Denies dysuria frequency or hematuria.  Denies OTC medication home remedy.  Denies alleviating or exacerbating factors.    DDX: includes but is not limited to: Colitis, enterocolitis, diverticulitis or other    Patient arrives stable with vitals interpreted by myself.     Pertinent features from physical exam: Lung sounds are clear bilaterally throughout.   Abdomen is soft.  There is tenderness with palpation of periumbilical region.  Bowel sounds are normal.  Cardiac sounds are normal..    Initial diagnostic plan: CBC, CMP, lipase, urinalysis, CT abdomen pelvis    Results from initial plan were reviewed and interpreted by me revealing WBC count is 19.28.  CMP is within appropriate range.  Urinalysis normal.  CT ab pelvis following impression acute sigmoid diverticulitis.  Small amount of pelvic free fluid likely reactive.  No evidence of abscess or bowel obstruction    Diagnostic information from other sources: Chart review    Interventions / Re-evaluation: Vital signs stable throughout encounter    Results/clinical rationale were discussed with patient    Consultations/Discussion of results with other physicians: N/A    Disposition plan: Patient is hemodynamically stable nontoxic-appearing appropriate discharge.  Outpatient follow-up with PCP in 1 week.  Follow-up ER for new or symptoms.  Will send home on Augmentin.  -----        Final diagnoses:   Sigmoid diverticulitis          Breezy Wright, APRN  11/09/24 1502

## 2024-12-09 ENCOUNTER — APPOINTMENT (OUTPATIENT)
Dept: CT IMAGING | Facility: HOSPITAL | Age: 34
DRG: 391 | End: 2024-12-09
Payer: MEDICAID

## 2024-12-09 ENCOUNTER — HOSPITAL ENCOUNTER (EMERGENCY)
Facility: HOSPITAL | Age: 34
Discharge: HOME | DRG: 391 | End: 2024-12-09
Attending: STUDENT IN AN ORGANIZED HEALTH CARE EDUCATION/TRAINING PROGRAM | Admitting: STUDENT IN AN ORGANIZED HEALTH CARE EDUCATION/TRAINING PROGRAM
Payer: MEDICAID

## 2024-12-09 ENCOUNTER — APPOINTMENT (OUTPATIENT)
Dept: GENERAL RADIOLOGY | Facility: HOSPITAL | Age: 34
DRG: 391 | End: 2024-12-09
Payer: MEDICAID

## 2024-12-09 VITALS
WEIGHT: 315 LBS | TEMPERATURE: 97.8 F | RESPIRATION RATE: 18 BRPM | BODY MASS INDEX: 39.17 KG/M2 | HEIGHT: 75 IN | DIASTOLIC BLOOD PRESSURE: 78 MMHG | HEART RATE: 80 BPM | OXYGEN SATURATION: 94 % | SYSTOLIC BLOOD PRESSURE: 134 MMHG

## 2024-12-09 DIAGNOSIS — J45.41 MODERATE PERSISTENT ASTHMA WITH EXACERBATION: Primary | ICD-10-CM

## 2024-12-09 DIAGNOSIS — K57.92 DIVERTICULITIS: ICD-10-CM

## 2024-12-09 LAB
ALBUMIN SERPL-MCNC: 4.1 G/DL (ref 3.5–5.2)
ALBUMIN/GLOB SERPL: 1.4 G/DL
ALP SERPL-CCNC: 76 U/L (ref 39–117)
ALT SERPL W P-5'-P-CCNC: 20 U/L (ref 1–41)
ANION GAP SERPL CALCULATED.3IONS-SCNC: 10.4 MMOL/L (ref 5–15)
AST SERPL-CCNC: 14 U/L (ref 1–40)
BASOPHILS # BLD AUTO: 0.05 10*3/MM3 (ref 0–0.2)
BASOPHILS NFR BLD AUTO: 0.3 % (ref 0–1.5)
BILIRUB SERPL-MCNC: 0.8 MG/DL (ref 0–1.2)
BILIRUB UR QL STRIP: NEGATIVE
BUN SERPL-MCNC: 12 MG/DL (ref 6–20)
BUN/CREAT SERPL: 10.5 (ref 7–25)
CALCIUM SPEC-SCNC: 9 MG/DL (ref 8.6–10.5)
CHLORIDE SERPL-SCNC: 102 MMOL/L (ref 98–107)
CLARITY UR: CLEAR
CO2 SERPL-SCNC: 24.6 MMOL/L (ref 22–29)
COLOR UR: ABNORMAL
CREAT SERPL-MCNC: 1.14 MG/DL (ref 0.76–1.27)
CRP SERPL-MCNC: 8.51 MG/DL (ref 0–0.5)
D-LACTATE SERPL-SCNC: 1.6 MMOL/L (ref 0.5–2)
DEPRECATED RDW RBC AUTO: 40.1 FL (ref 37–54)
EGFRCR SERPLBLD CKD-EPI 2021: 87.1 ML/MIN/1.73
EOSINOPHIL # BLD AUTO: 0.03 10*3/MM3 (ref 0–0.4)
EOSINOPHIL NFR BLD AUTO: 0.2 % (ref 0.3–6.2)
ERYTHROCYTE [DISTWIDTH] IN BLOOD BY AUTOMATED COUNT: 13.3 % (ref 12.3–15.4)
FLUAV SUBTYP SPEC NAA+PROBE: NOT DETECTED
FLUBV RNA ISLT QL NAA+PROBE: NOT DETECTED
GLOBULIN UR ELPH-MCNC: 2.9 GM/DL
GLUCOSE SERPL-MCNC: 133 MG/DL (ref 65–99)
GLUCOSE UR STRIP-MCNC: NEGATIVE MG/DL
HCT VFR BLD AUTO: 47.1 % (ref 37.5–51)
HGB BLD-MCNC: 15.6 G/DL (ref 13–17.7)
HGB UR QL STRIP.AUTO: NEGATIVE
IMM GRANULOCYTES # BLD AUTO: 0.1 10*3/MM3 (ref 0–0.05)
IMM GRANULOCYTES NFR BLD AUTO: 0.5 % (ref 0–0.5)
KETONES UR QL STRIP: NEGATIVE
LEUKOCYTE ESTERASE UR QL STRIP.AUTO: NEGATIVE
LIPASE SERPL-CCNC: 18 U/L (ref 13–60)
LYMPHOCYTES # BLD AUTO: 1.39 10*3/MM3 (ref 0.7–3.1)
LYMPHOCYTES NFR BLD AUTO: 7.3 % (ref 19.6–45.3)
MCH RBC QN AUTO: 27.5 PG (ref 26.6–33)
MCHC RBC AUTO-ENTMCNC: 33.1 G/DL (ref 31.5–35.7)
MCV RBC AUTO: 82.9 FL (ref 79–97)
MONOCYTES # BLD AUTO: 1.64 10*3/MM3 (ref 0.1–0.9)
MONOCYTES NFR BLD AUTO: 8.6 % (ref 5–12)
NEUTROPHILS NFR BLD AUTO: 15.87 10*3/MM3 (ref 1.7–7)
NEUTROPHILS NFR BLD AUTO: 83.1 % (ref 42.7–76)
NITRITE UR QL STRIP: NEGATIVE
NRBC BLD AUTO-RTO: 0 /100 WBC (ref 0–0.2)
PH UR STRIP.AUTO: 6 [PH] (ref 5–8)
PLATELET # BLD AUTO: 167 10*3/MM3 (ref 140–450)
PMV BLD AUTO: 11.5 FL (ref 6–12)
POTASSIUM SERPL-SCNC: 4.2 MMOL/L (ref 3.5–5.2)
PROT SERPL-MCNC: 7 G/DL (ref 6–8.5)
PROT UR QL STRIP: NEGATIVE
RBC # BLD AUTO: 5.68 10*6/MM3 (ref 4.14–5.8)
SARS-COV-2 RNA RESP QL NAA+PROBE: NOT DETECTED
SODIUM SERPL-SCNC: 137 MMOL/L (ref 136–145)
SP GR UR STRIP: >1.03 (ref 1–1.03)
UROBILINOGEN UR QL STRIP: ABNORMAL
WBC NRBC COR # BLD AUTO: 19.08 10*3/MM3 (ref 3.4–10.8)

## 2024-12-09 PROCEDURE — 87636 SARSCOV2 & INF A&B AMP PRB: CPT | Performed by: STUDENT IN AN ORGANIZED HEALTH CARE EDUCATION/TRAINING PROGRAM

## 2024-12-09 PROCEDURE — 81003 URINALYSIS AUTO W/O SCOPE: CPT | Performed by: STUDENT IN AN ORGANIZED HEALTH CARE EDUCATION/TRAINING PROGRAM

## 2024-12-09 PROCEDURE — 25010000002 ONDANSETRON PER 1 MG: Performed by: STUDENT IN AN ORGANIZED HEALTH CARE EDUCATION/TRAINING PROGRAM

## 2024-12-09 PROCEDURE — 83690 ASSAY OF LIPASE: CPT | Performed by: STUDENT IN AN ORGANIZED HEALTH CARE EDUCATION/TRAINING PROGRAM

## 2024-12-09 PROCEDURE — 25510000001 IOPAMIDOL 61 % SOLUTION: Performed by: STUDENT IN AN ORGANIZED HEALTH CARE EDUCATION/TRAINING PROGRAM

## 2024-12-09 PROCEDURE — 83605 ASSAY OF LACTIC ACID: CPT | Performed by: STUDENT IN AN ORGANIZED HEALTH CARE EDUCATION/TRAINING PROGRAM

## 2024-12-09 PROCEDURE — 25010000002 DEXAMETHASONE SODIUM PHOSPHATE 10 MG/ML SOLUTION: Performed by: STUDENT IN AN ORGANIZED HEALTH CARE EDUCATION/TRAINING PROGRAM

## 2024-12-09 PROCEDURE — 80053 COMPREHEN METABOLIC PANEL: CPT | Performed by: STUDENT IN AN ORGANIZED HEALTH CARE EDUCATION/TRAINING PROGRAM

## 2024-12-09 PROCEDURE — 94640 AIRWAY INHALATION TREATMENT: CPT

## 2024-12-09 PROCEDURE — 74177 CT ABD & PELVIS W/CONTRAST: CPT

## 2024-12-09 PROCEDURE — 85025 COMPLETE CBC W/AUTO DIFF WBC: CPT | Performed by: STUDENT IN AN ORGANIZED HEALTH CARE EDUCATION/TRAINING PROGRAM

## 2024-12-09 PROCEDURE — 71275 CT ANGIOGRAPHY CHEST: CPT

## 2024-12-09 PROCEDURE — 94760 N-INVAS EAR/PLS OXIMETRY 1: CPT

## 2024-12-09 PROCEDURE — 71045 X-RAY EXAM CHEST 1 VIEW: CPT

## 2024-12-09 PROCEDURE — 25010000002 MAGNESIUM SULFATE 2 GM/50ML SOLUTION: Performed by: STUDENT IN AN ORGANIZED HEALTH CARE EDUCATION/TRAINING PROGRAM

## 2024-12-09 PROCEDURE — 86140 C-REACTIVE PROTEIN: CPT | Performed by: STUDENT IN AN ORGANIZED HEALTH CARE EDUCATION/TRAINING PROGRAM

## 2024-12-09 PROCEDURE — 99285 EMERGENCY DEPT VISIT HI MDM: CPT | Performed by: STUDENT IN AN ORGANIZED HEALTH CARE EDUCATION/TRAINING PROGRAM

## 2024-12-09 PROCEDURE — 96365 THER/PROPH/DIAG IV INF INIT: CPT

## 2024-12-09 PROCEDURE — 94799 UNLISTED PULMONARY SVC/PX: CPT

## 2024-12-09 PROCEDURE — 96375 TX/PRO/DX INJ NEW DRUG ADDON: CPT

## 2024-12-09 RX ORDER — IOPAMIDOL 612 MG/ML
100 INJECTION, SOLUTION INTRAVASCULAR
Status: COMPLETED | OUTPATIENT
Start: 2024-12-09 | End: 2024-12-09

## 2024-12-09 RX ORDER — IPRATROPIUM BROMIDE AND ALBUTEROL SULFATE 2.5; .5 MG/3ML; MG/3ML
3 SOLUTION RESPIRATORY (INHALATION) ONCE
Status: COMPLETED | OUTPATIENT
Start: 2024-12-09 | End: 2024-12-09

## 2024-12-09 RX ORDER — MAGNESIUM SULFATE HEPTAHYDRATE 40 MG/ML
2 INJECTION, SOLUTION INTRAVENOUS ONCE
Status: COMPLETED | OUTPATIENT
Start: 2024-12-09 | End: 2024-12-09

## 2024-12-09 RX ORDER — ONDANSETRON 2 MG/ML
4 INJECTION INTRAMUSCULAR; INTRAVENOUS ONCE
Status: COMPLETED | OUTPATIENT
Start: 2024-12-09 | End: 2024-12-09

## 2024-12-09 RX ORDER — DEXAMETHASONE SODIUM PHOSPHATE 10 MG/ML
10 INJECTION, SOLUTION INTRAMUSCULAR; INTRAVENOUS ONCE
Status: COMPLETED | OUTPATIENT
Start: 2024-12-09 | End: 2024-12-09

## 2024-12-09 RX ORDER — ALBUTEROL SULFATE 90 UG/1
2 INHALANT RESPIRATORY (INHALATION) EVERY 4 HOURS PRN
Qty: 8 G | Refills: 0 | Status: SHIPPED | OUTPATIENT
Start: 2024-12-09

## 2024-12-09 RX ORDER — PREDNISONE 20 MG/1
40 TABLET ORAL DAILY
Qty: 10 TABLET | Refills: 0 | Status: SHIPPED | OUTPATIENT
Start: 2024-12-09 | End: 2024-12-14

## 2024-12-09 RX ADMIN — DEXAMETHASONE SODIUM PHOSPHATE 10 MG: 10 INJECTION INTRAMUSCULAR; INTRAVENOUS at 11:58

## 2024-12-09 RX ADMIN — ONDANSETRON 4 MG: 2 INJECTION INTRAMUSCULAR; INTRAVENOUS at 11:58

## 2024-12-09 RX ADMIN — AMOXICILLIN AND CLAVULANATE POTASSIUM 1 TABLET: 875; 125 TABLET, FILM COATED ORAL at 14:24

## 2024-12-09 RX ADMIN — IOPAMIDOL 100 ML: 612 INJECTION, SOLUTION INTRAVENOUS at 12:50

## 2024-12-09 RX ADMIN — IPRATROPIUM BROMIDE AND ALBUTEROL SULFATE 3 ML: 2.5; .5 SOLUTION RESPIRATORY (INHALATION) at 12:19

## 2024-12-09 RX ADMIN — MAGNESIUM SULFATE HEPTAHYDRATE 2 G: 40 INJECTION, SOLUTION INTRAVENOUS at 11:58

## 2024-12-09 NOTE — Clinical Note
[facility] EMERGENCY DEPARTMENT  [address] [address]  Phone: [phone]    Ozzie Le was seen and treated in our emergency department on 12/9/2024.  He may return to work on 12/13/2024.         Thank you for choosing [facility].    Giovani Jeffery MD

## 2024-12-09 NOTE — DISCHARGE INSTRUCTIONS
You were evaluated for abdominal pain.  We did a physical exam and also heard you wheezing.  We got a CT scan and a CT scan of your chest and gave you steroids a breathing treatment magnesium to help your breathing.  This improved while you are in the emergency department.  We found that you had diverticulitis on your CT scan and we gave you a dose of Augmentin in the emergency department.  Will continue to treat you with Augmentin in the outpatient setting we have also sent a course of steroids and an albuterol inhaler to help with your breathing at home.  Would follow-up with your primary care doctor to ensure that both things improve.  If you have worsening shortness of breath or chest pain in spite of this therapy or if you have worsening abdominal pain or fever in spite of your antibiotics, please come back to the to the emergency department further evaluation.  You are now stable for discharge.

## 2024-12-09 NOTE — ED PROVIDER NOTES
Subjective:  History of Present Illness:    Patient is a 34-year-old male history of hypertension, polycythemia vera, COPD who presents today with abdominal pain.  Reports onset of symptoms over the last 2 to 3 days with worsening abdominal pain over the last 24 hours.  Endorsing nausea as well as diffuse abdominal pain worse in the left side.  Does state history of diverticulitis.  Denies any dysuria or hematuria.  Denies any chest pain.  Does endorse shortness of breath is came on this morning.  Wheezing throughout on my auscultation.  Denies any productive cough.  Has had congestion and rhinorrhea.  Chills prior to arrival.  Denies any unilateral leg swelling or leg pain.  No personal history of PE/DVT.      Nurses Notes reviewed and agree, including vitals, allergies, social history and prior medical history.     REVIEW OF SYSTEMS: All systems reviewed and not pertinent unless noted.  Review of Systems   Constitutional:  Positive for activity change and appetite change. Negative for chills, fatigue and fever.   HENT:  Negative for congestion, sinus pressure, sneezing and trouble swallowing.    Eyes:  Negative for discharge and itching.   Respiratory:  Negative for cough and shortness of breath.    Cardiovascular:  Negative for chest pain and palpitations.   Gastrointestinal:  Positive for abdominal pain and nausea. Negative for abdominal distention, blood in stool, constipation, diarrhea and vomiting.   Endocrine: Negative for cold intolerance and heat intolerance.   Genitourinary:  Negative for decreased urine volume, dysuria and urgency.   Musculoskeletal:  Negative for gait problem, neck pain and neck stiffness.   Skin:  Negative for color change and rash.   Allergic/Immunologic: Negative for immunocompromised state.   Neurological:  Negative for facial asymmetry and headaches.   Hematological:  Negative for adenopathy.   Psychiatric/Behavioral:  Negative for self-injury and suicidal ideas.        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      Past Surgical History:   Procedure Laterality Date    DENTAL PROCEDURE      HERNIA REPAIR      TONSILLECTOMY           Social History     Socioeconomic History    Marital status: Legally    Tobacco Use    Smoking status: Every Day     Current packs/day: 1.00     Average packs/day: 1 pack/day for 15.0 years (15.0 ttl pk-yrs)     Types: Cigarettes    Smokeless tobacco: Never   Vaping Use    Vaping status: Never Used   Substance and Sexual Activity    Alcohol use: Not Currently    Drug use: Never    Sexual activity: Defer         Family History   Problem Relation Age of Onset    Cancer Mother     Kidney disease Mother     Heart disease Father     Polycythemia Father        Objective  Physical Exam:  /78   Pulse 80   Temp 97.8 °F (36.6 °C) (Oral)   Resp 18   Ht 190.5 cm (75\")   Wt (!) 145 kg (320 lb)   SpO2 94%   BMI 40.00 kg/m²      Physical Exam  Constitutional:       General: He is not in acute distress.     Appearance: Normal appearance. He is obese. He is not ill-appearing.   HENT:      Head: Normocephalic and atraumatic.      Nose: Nose normal. No congestion or rhinorrhea.      Mouth/Throat:      Mouth: Mucous membranes are moist.      Pharynx: Oropharynx is clear.   Eyes:      Extraocular Movements: Extraocular movements intact.      Conjunctiva/sclera: Conjunctivae normal.      Pupils: Pupils are equal, round, and reactive to light.   Cardiovascular:      Rate and Rhythm: Normal rate and regular rhythm.      Pulses: Normal pulses.   Pulmonary:      Effort: Pulmonary effort is normal. No respiratory distress.      Breath sounds: Wheezing present.   Abdominal:      General: Abdomen is flat. Bowel sounds are normal. There is no " distension.      Palpations: Abdomen is soft.      Tenderness: There is generalized abdominal tenderness. There is no right CVA tenderness, left CVA tenderness, guarding or rebound.   Musculoskeletal:         General: No swelling or tenderness. Normal range of motion.      Cervical back: Normal range of motion and neck supple. No rigidity or tenderness.   Skin:     General: Skin is warm and dry.      Capillary Refill: Capillary refill takes less than 2 seconds.   Neurological:      General: No focal deficit present.      Mental Status: He is alert and oriented to person, place, and time. Mental status is at baseline.      Cranial Nerves: No cranial nerve deficit.      Sensory: No sensory deficit.      Motor: No weakness.   Psychiatric:         Mood and Affect: Mood normal.         Behavior: Behavior normal.         Thought Content: Thought content normal.         Judgment: Judgment normal.         Procedures    ED Course:    ED Course as of 12/09/24 1506   Mon Dec 09, 2024   1205 Chest x-ray independently interpreted by me showing no acute process [JE]      ED Course User Index  [JE] Giovani Jeffery MD       Lab Results (last 24 hours)       Procedure Component Value Units Date/Time    COVID-19 and FLU A/B PCR, 1 HR TAT - Swab, Nasopharynx [[id number]]  (Normal) Collected: 12/09/24 1130    Specimen: Swab from Nasopharynx Updated: 12/09/24 1215     COVID19 Not Detected     Influenza A PCR Not Detected     Influenza B PCR Not Detected    Narrative:      Fact sheet for providers: https://www.fda.gov/media/103151/download    Fact sheet for patients: https://www.fda.gov/media/076679/download    Test performed by PCR.    CBC & Differential [[id number]]  (Abnormal) Collected: 12/09/24 1130    Specimen: Blood Updated: 12/09/24 1138    Narrative:      The following orders were created for panel order CBC & Differential.  Procedure                               Abnormality         Status                     ---------                                -----------         ------                     CBC Auto Differential[[id number]]        Abnormal            Final result                 Please view results for these tests on the individual orders.    Comprehensive Metabolic Panel [[id number]]  (Abnormal) Collected: 12/09/24 1130    Specimen: Blood Updated: 12/09/24 1157     Glucose 133 mg/dL      BUN 12 mg/dL      Creatinine 1.14 mg/dL      Sodium 137 mmol/L      Potassium 4.2 mmol/L      Chloride 102 mmol/L      CO2 24.6 mmol/L      Calcium 9.0 mg/dL      Total Protein 7.0 g/dL      Albumin 4.1 g/dL      ALT (SGPT) 20 U/L      AST (SGOT) 14 U/L      Alkaline Phosphatase 76 U/L      Total Bilirubin 0.8 mg/dL      Globulin 2.9 gm/dL      A/G Ratio 1.4 g/dL      BUN/Creatinine Ratio 10.5     Anion Gap 10.4 mmol/L      eGFR 87.1 mL/min/1.73     Narrative:      GFR Normal >60  Chronic Kidney Disease <60  Kidney Failure <15      Lipase [[id number]]  (Normal) Collected: 12/09/24 1130    Specimen: Blood Updated: 12/09/24 1157     Lipase 18 U/L     C-reactive Protein [[id number]]  (Abnormal) Collected: 12/09/24 1130    Specimen: Blood Updated: 12/09/24 1157     C-Reactive Protein 8.51 mg/dL     Lactic Acid, Plasma [[id number]]  (Normal) Collected: 12/09/24 1130    Specimen: Blood Updated: 12/09/24 1153     Lactate 1.6 mmol/L     CBC Auto Differential [[id number]]  (Abnormal) Collected: 12/09/24 1130    Specimen: Blood Updated: 12/09/24 1138     WBC 19.08 10*3/mm3      RBC 5.68 10*6/mm3      Hemoglobin 15.6 g/dL      Hematocrit 47.1 %      MCV 82.9 fL      MCH 27.5 pg      MCHC 33.1 g/dL      RDW 13.3 %      RDW-SD 40.1 fl      MPV 11.5 fL      Platelets 167 10*3/mm3      Neutrophil % 83.1 %      Lymphocyte % 7.3 %      Monocyte % 8.6 %      Eosinophil % 0.2 %      Basophil % 0.3 %      Immature Grans % 0.5 %      Neutrophils, Absolute 15.87 10*3/mm3      Lymphocytes, Absolute 1.39 10*3/mm3      Monocytes, Absolute 1.64 10*3/mm3      Eosinophils, Absolute  0.03 10*3/mm3      Basophils, Absolute 0.05 10*3/mm3      Immature Grans, Absolute 0.10 10*3/mm3      nRBC 0.0 /100 WBC     Urinalysis With Culture If Indicated - Urine, Clean Catch [[id number]]  (Abnormal) Collected: 12/09/24 1411    Specimen: Urine, Clean Catch Updated: 12/09/24 1420     Color, UA Dark Yellow     Appearance, UA Clear     pH, UA 6.0     Specific Gravity, UA >1.030     Glucose, UA Negative     Ketones, UA Negative     Bilirubin, UA Negative     Blood, UA Negative     Protein, UA Negative     Leuk Esterase, UA Negative     Nitrite, UA Negative     Urobilinogen, UA 1.0 E.U./dL    Narrative:      In absence of clinical symptoms, the presence of pyuria, bacteria, and/or nitrites on the urinalysis result does not correlate with infection.  Urine microscopic not indicated.             CT Abdomen Pelvis With Contrast    Result Date: 12/9/2024  PROCEDURE: CT ABDOMEN PELVIS W CONTRAST-  HISTORY:  Diffuse abdominal pain, history of diverticulitis, eval diverticulitis  COMPARISON: November 9, 2024.  TECHNIQUE: Multiple axial CT images were obtained from the lung bases through the pubic symphysis following the administration of Isovue 300 contrast.  FINDINGS:  ABDOMEN: The liver shows fatty infiltration. The gallbladder is present. There is no CT evidence of gallstones. The spleen is unremarkable. No adrenal mass is present.  The pancreas is normal. The kidneys are normal. The aorta is normal in caliber.  PELVIS: The appendix is retrocecal and appears unremarkable. There are multiple diverticuli. There is wall thickening and significant infiltration of surrounding fat of the mid sigmoid colon. A loop of ileum seen located superior and to the right demonstrates significant wall thickening and significant infiltration of the surrounding fat, likely reactive. Air-fluid levels are seen in nondistended small bowel loops likely due to ileus from inflammatory process. A few bubbles of extraluminal air are seen between  the sigmoid colon and ileum on image #49 of series 3 and image #82 of series 2. No rim-enhancing fluid collection is seen to suggest abscess. There is a minimal amount of free fluid in the pelvis. Prostate is normal in size. The urinary bladder is unremarkable.         Impression: 1. Mid sigmoid diverticulitis with small bubbles of extraluminal air. No juvenal abscess formation. 2. Inflammatory changes seen in an adjacent loop of ileum. 3. Bowel gas pattern suggestive of ileus.   CTDI: 17.05 mGy DLP: 1444.41 mGy.cm   This study was performed with techniques to keep radiation doses as low as reasonably achievable (ALARA). Individualized dose reduction techniques using automated exposure control or adjustment of mA and/or kV according to the patient size were employed.      Images were reviewed, interpreted, and dictated by Dr. Juanita Agosto MD Transcribed by Torri Chiang PA-C.  This report was signed and finalized on 12/9/2024 2:16 PM by Juanita Agosto MD.      CT Angiogram Chest Pulmonary Embolism    Result Date: 12/9/2024  PROCEDURE: CT ANGIOGRAM CHEST PULMONARY EMBOLISM-  HISTORY: Shortness of breath, tachycardic, eval PE, bacterial pneumonia  COMPARISON: None .  TECHNIQUE: Multiple axial CT images were obtained from the thoracic inlet through the upper abdomen following the administration of Isovue 300 per the CT PE protocol. Coronal and oblique 3D MIP images were reconstructed from the original axial data set.  FINDINGS: There are no filling defects within the pulmonary arteries to suggest an embolus. The thoracic aorta is normal in caliber with no evidence of dissection. The heart is normal in size. There are no pleural or pericardial effusions. There is no adenopathy.  Lung windows reveal no focal opacities or suspicious pulmonary nodules.      Impression: No evidence of pulmonary embolus or dissection.     DLP: 1444.41 mGy.cm CTDI: 17.05 mGy   This study was performed with techniques to keep radiation doses as  low as reasonably achievable (ALARA). Individualized dose reduction techniques using automated exposure control or adjustment of mA and/or kV according to the patient size were employed.     Images were reviewed, interpreted, and dictated by Dr. Juanita Agosto MD Transcribed by Torri Chiang PA-C.  This report was signed and finalized on 12/9/2024 2:03 PM by Juanita Agosto MD.      XR Chest 1 View    Result Date: 12/9/2024  PROCEDURE: XR CHEST 1 VW-    HISTORY: Shortness of breath  COMPARISON: July 2021.  FINDINGS: Apical lordotic view. The heart is normal in size. The mediastinum is unremarkable. The lungs are clear. There is no pneumothorax. There are no acute osseous abnormalities.      Impression: No acute cardiopulmonary process.        Images were reviewed, interpreted, and dictated by Dr. Juanita Agosto MD Transcribed by Torri Chiang PA-C.  This report was signed and finalized on 12/9/2024 12:35 PM by Juanita Agosto MD.          MDM      Initial impression of presenting illness: Abdominal pain, shortness of breath    DDX: includes but is not limited to: Viral URI, gastroenteritis, bacterial pneumonia, PE, ACS, MI, diverticulitis    Patient arrives stable with vitals interpreted by myself.     Pertinent features from physical exam: Wheezing throughout on exam with no increased work of breathing, regular rate and rhythm with no murmur, endorsing diffuse tenderness palpation with no guarding or rebound.    Initial diagnostic plan: CBC, CMP, troponin, EKG, chest x-ray, lipase, CRP, lactic acid, magnesium, CT abdomen pelvis, CT PE    Results from initial plan were reviewed and interpreted by me revealing patient with diverticulitis on CT abdomen pelvis, no concern for PE or bacterial pneumonia on CT chest    Diagnostic information from other sources: Discussed with significant other bedside reviewed past medical records    Interventions / Re-evaluation: Given Decadron, DuoNeb, magnesium due to concerns for reactive  airway disease, given concerns for diverticulitis given Augmentin    Results/clinical rationale were discussed with patient at bedside    Consultations/Discussion of results with other physicians: Discussed my concern for possible viral illness causing reactive airway disease exacerbation and diverticulitis seen on CT.  I prescribed patient Augmentin and encouraged him to follow his primary care doctor to ensure resolution of symptoms and given strict return precaution for any significant increase in abdominal pain, fever in spite of antibiotic therapy.  Patient is able to be weaned back to room air after treatment for reactive airway disease and I prescribed patient oral steroids and an albuterol inhaler for home use.  Encouraged him to follow-up with his primary care doctor to discuss this moving forward and to ensure adequate healing.    Disposition plan: Discharge  -----        Final diagnoses:   Moderate persistent asthma with exacerbation   Diverticulitis          Giovani Jeffery MD  12/09/24 6937

## 2024-12-11 ENCOUNTER — TRANSCRIBE ORDERS (OUTPATIENT)
Facility: HOSPITAL | Age: 34
End: 2024-12-11
Payer: MEDICAID

## 2024-12-12 ENCOUNTER — HOSPITAL ENCOUNTER (INPATIENT)
Facility: HOSPITAL | Age: 34
LOS: 1 days | Discharge: HOME | DRG: 391 | End: 2024-12-13
Attending: STUDENT IN AN ORGANIZED HEALTH CARE EDUCATION/TRAINING PROGRAM | Admitting: INTERNAL MEDICINE
Payer: MEDICAID

## 2024-12-12 ENCOUNTER — APPOINTMENT (OUTPATIENT)
Dept: CT IMAGING | Facility: HOSPITAL | Age: 34
DRG: 391 | End: 2024-12-12
Payer: MEDICAID

## 2024-12-12 VITALS
HEART RATE: 64 BPM | TEMPERATURE: 97.9 F | OXYGEN SATURATION: 94 % | HEIGHT: 75 IN | BODY MASS INDEX: 39.17 KG/M2 | RESPIRATION RATE: 16 BRPM | DIASTOLIC BLOOD PRESSURE: 76 MMHG | SYSTOLIC BLOOD PRESSURE: 129 MMHG | WEIGHT: 315 LBS

## 2024-12-12 DIAGNOSIS — K57.92 DIVERTICULITIS: Primary | ICD-10-CM

## 2024-12-12 DIAGNOSIS — K65.1 INTRA-ABDOMINAL ABSCESS: ICD-10-CM

## 2024-12-12 PROBLEM — E83.110 HEREDITARY HEMOCHROMATOSIS: Status: ACTIVE | Noted: 2024-12-12

## 2024-12-12 LAB
ALBUMIN SERPL-MCNC: 3.7 G/DL (ref 3.5–5.2)
ALBUMIN/GLOB SERPL: 1.2 G/DL
ALP SERPL-CCNC: 71 U/L (ref 39–117)
ALT SERPL W P-5'-P-CCNC: 18 U/L (ref 1–41)
ANION GAP SERPL CALCULATED.3IONS-SCNC: 13.3 MMOL/L (ref 5–15)
AST SERPL-CCNC: 13 U/L (ref 1–40)
BASOPHILS # BLD AUTO: 0.04 10*3/MM3 (ref 0–0.2)
BASOPHILS NFR BLD AUTO: 0.3 % (ref 0–1.5)
BILIRUB SERPL-MCNC: 0.4 MG/DL (ref 0–1.2)
BILIRUB UR QL STRIP: NEGATIVE
BUN SERPL-MCNC: 10 MG/DL (ref 6–20)
BUN/CREAT SERPL: 9.9 (ref 7–25)
CALCIUM SPEC-SCNC: 8.7 MG/DL (ref 8.6–10.5)
CHLORIDE SERPL-SCNC: 101 MMOL/L (ref 98–107)
CLARITY UR: CLEAR
CO2 SERPL-SCNC: 24.7 MMOL/L (ref 22–29)
COLOR UR: YELLOW
CREAT SERPL-MCNC: 1.01 MG/DL (ref 0.76–1.27)
DEPRECATED RDW RBC AUTO: 39.9 FL (ref 37–54)
EGFRCR SERPLBLD CKD-EPI 2021: 100.7 ML/MIN/1.73
EOSINOPHIL # BLD AUTO: 0.4 10*3/MM3 (ref 0–0.4)
EOSINOPHIL NFR BLD AUTO: 2.7 % (ref 0.3–6.2)
ERYTHROCYTE [DISTWIDTH] IN BLOOD BY AUTOMATED COUNT: 13.2 % (ref 12.3–15.4)
GLOBULIN UR ELPH-MCNC: 3.1 GM/DL
GLUCOSE SERPL-MCNC: 128 MG/DL (ref 65–99)
GLUCOSE UR STRIP-MCNC: NEGATIVE MG/DL
HCT VFR BLD AUTO: 46.3 % (ref 37.5–51)
HEMOCCULT STL QL: NEGATIVE
HGB BLD-MCNC: 15.3 G/DL (ref 13–17.7)
HGB UR QL STRIP.AUTO: NEGATIVE
HOLD SPECIMEN: NORMAL
HOLD SPECIMEN: NORMAL
IMM GRANULOCYTES # BLD AUTO: 0.05 10*3/MM3 (ref 0–0.05)
IMM GRANULOCYTES NFR BLD AUTO: 0.3 % (ref 0–0.5)
KETONES UR QL STRIP: ABNORMAL
LEUKOCYTE ESTERASE UR QL STRIP.AUTO: NEGATIVE
LIPASE SERPL-CCNC: 16 U/L (ref 13–60)
LYMPHOCYTES # BLD AUTO: 0.82 10*3/MM3 (ref 0.7–3.1)
LYMPHOCYTES NFR BLD AUTO: 5.5 % (ref 19.6–45.3)
MCH RBC QN AUTO: 27.4 PG (ref 26.6–33)
MCHC RBC AUTO-ENTMCNC: 33 G/DL (ref 31.5–35.7)
MCV RBC AUTO: 83 FL (ref 79–97)
MONOCYTES # BLD AUTO: 0.65 10*3/MM3 (ref 0.1–0.9)
MONOCYTES NFR BLD AUTO: 4.4 % (ref 5–12)
NEUTROPHILS NFR BLD AUTO: 12.9 10*3/MM3 (ref 1.7–7)
NEUTROPHILS NFR BLD AUTO: 86.8 % (ref 42.7–76)
NITRITE UR QL STRIP: NEGATIVE
NRBC BLD AUTO-RTO: 0 /100 WBC (ref 0–0.2)
PH UR STRIP.AUTO: 6 [PH] (ref 5–8)
PLATELET # BLD AUTO: 224 10*3/MM3 (ref 140–450)
PMV BLD AUTO: 11 FL (ref 6–12)
POTASSIUM SERPL-SCNC: 4.1 MMOL/L (ref 3.5–5.2)
PROT SERPL-MCNC: 6.8 G/DL (ref 6–8.5)
PROT UR QL STRIP: NEGATIVE
RBC # BLD AUTO: 5.58 10*6/MM3 (ref 4.14–5.8)
SODIUM SERPL-SCNC: 139 MMOL/L (ref 136–145)
SP GR UR STRIP: 1.02 (ref 1–1.03)
UROBILINOGEN UR QL STRIP: ABNORMAL
WBC NRBC COR # BLD AUTO: 14.86 10*3/MM3 (ref 3.4–10.8)
WHOLE BLOOD HOLD COAG: NORMAL
WHOLE BLOOD HOLD SPECIMEN: NORMAL

## 2024-12-12 PROCEDURE — 25810000003 SODIUM CHLORIDE 0.9 % SOLUTION: Performed by: NURSE PRACTITIONER

## 2024-12-12 PROCEDURE — 25510000001 IOPAMIDOL 61 % SOLUTION: Performed by: STUDENT IN AN ORGANIZED HEALTH CARE EDUCATION/TRAINING PROGRAM

## 2024-12-12 PROCEDURE — 99285 EMERGENCY DEPT VISIT HI MDM: CPT | Performed by: STUDENT IN AN ORGANIZED HEALTH CARE EDUCATION/TRAINING PROGRAM

## 2024-12-12 PROCEDURE — 83690 ASSAY OF LIPASE: CPT | Performed by: STUDENT IN AN ORGANIZED HEALTH CARE EDUCATION/TRAINING PROGRAM

## 2024-12-12 PROCEDURE — 74177 CT ABD & PELVIS W/CONTRAST: CPT

## 2024-12-12 PROCEDURE — 81003 URINALYSIS AUTO W/O SCOPE: CPT | Performed by: STUDENT IN AN ORGANIZED HEALTH CARE EDUCATION/TRAINING PROGRAM

## 2024-12-12 PROCEDURE — 80053 COMPREHEN METABOLIC PANEL: CPT | Performed by: STUDENT IN AN ORGANIZED HEALTH CARE EDUCATION/TRAINING PROGRAM

## 2024-12-12 PROCEDURE — 87040 BLOOD CULTURE FOR BACTERIA: CPT | Performed by: STUDENT IN AN ORGANIZED HEALTH CARE EDUCATION/TRAINING PROGRAM

## 2024-12-12 PROCEDURE — 25010000002 MORPHINE PER 10 MG: Performed by: STUDENT IN AN ORGANIZED HEALTH CARE EDUCATION/TRAINING PROGRAM

## 2024-12-12 PROCEDURE — 85025 COMPLETE CBC W/AUTO DIFF WBC: CPT | Performed by: STUDENT IN AN ORGANIZED HEALTH CARE EDUCATION/TRAINING PROGRAM

## 2024-12-12 PROCEDURE — 99222 1ST HOSP IP/OBS MODERATE 55: CPT | Performed by: INTERNAL MEDICINE

## 2024-12-12 PROCEDURE — 82272 OCCULT BLD FECES 1-3 TESTS: CPT | Performed by: NURSE PRACTITIONER

## 2024-12-12 PROCEDURE — 25010000002 PIPERACILLIN SOD-TAZOBACTAM PER 1 G

## 2024-12-12 PROCEDURE — 25010000002 ONDANSETRON PER 1 MG: Performed by: STUDENT IN AN ORGANIZED HEALTH CARE EDUCATION/TRAINING PROGRAM

## 2024-12-12 RX ORDER — SODIUM CHLORIDE 0.9 % (FLUSH) 0.9 %
10 SYRINGE (ML) INJECTION AS NEEDED
Status: DISCONTINUED | OUTPATIENT
Start: 2024-12-12 | End: 2024-12-13 | Stop reason: HOSPADM

## 2024-12-12 RX ORDER — SODIUM CHLORIDE 9 MG/ML
500 INJECTION, SOLUTION INTRAVENOUS ONCE
OUTPATIENT
Start: 2024-12-13

## 2024-12-12 RX ORDER — NICOTINE 21 MG/24HR
1 PATCH, TRANSDERMAL 24 HOURS TRANSDERMAL
Status: DISCONTINUED | OUTPATIENT
Start: 2024-12-12 | End: 2024-12-13 | Stop reason: HOSPADM

## 2024-12-12 RX ORDER — ONDANSETRON 2 MG/ML
4 INJECTION INTRAMUSCULAR; INTRAVENOUS EVERY 6 HOURS PRN
Status: DISCONTINUED | OUTPATIENT
Start: 2024-12-12 | End: 2024-12-13 | Stop reason: HOSPADM

## 2024-12-12 RX ORDER — SODIUM CHLORIDE 9 MG/ML
40 INJECTION, SOLUTION INTRAVENOUS AS NEEDED
Status: DISCONTINUED | OUTPATIENT
Start: 2024-12-12 | End: 2024-12-13 | Stop reason: HOSPADM

## 2024-12-12 RX ORDER — BUDESONIDE AND FORMOTEROL FUMARATE DIHYDRATE 160; 4.5 UG/1; UG/1
2 AEROSOL RESPIRATORY (INHALATION)
Status: DISCONTINUED | OUTPATIENT
Start: 2024-12-12 | End: 2024-12-13 | Stop reason: HOSPADM

## 2024-12-12 RX ORDER — SODIUM CHLORIDE 0.9 % (FLUSH) 0.9 %
10 SYRINGE (ML) INJECTION EVERY 12 HOURS SCHEDULED
Status: DISCONTINUED | OUTPATIENT
Start: 2024-12-12 | End: 2024-12-13 | Stop reason: HOSPADM

## 2024-12-12 RX ORDER — ONDANSETRON 2 MG/ML
4 INJECTION INTRAMUSCULAR; INTRAVENOUS ONCE
Status: COMPLETED | OUTPATIENT
Start: 2024-12-12 | End: 2024-12-12

## 2024-12-12 RX ORDER — IOPAMIDOL 612 MG/ML
100 INJECTION, SOLUTION INTRAVASCULAR
Status: COMPLETED | OUTPATIENT
Start: 2024-12-12 | End: 2024-12-12

## 2024-12-12 RX ORDER — MORPHINE SULFATE 2 MG/ML
2 INJECTION, SOLUTION INTRAMUSCULAR; INTRAVENOUS EVERY 4 HOURS PRN
Status: DISCONTINUED | OUTPATIENT
Start: 2024-12-12 | End: 2024-12-13 | Stop reason: HOSPADM

## 2024-12-12 RX ORDER — ENOXAPARIN SODIUM 100 MG/ML
40 INJECTION SUBCUTANEOUS EVERY 12 HOURS
Status: DISCONTINUED | OUTPATIENT
Start: 2024-12-12 | End: 2024-12-13 | Stop reason: HOSPADM

## 2024-12-12 RX ORDER — SODIUM CHLORIDE 9 MG/ML
50 INJECTION, SOLUTION INTRAVENOUS CONTINUOUS
Status: DISCONTINUED | OUTPATIENT
Start: 2024-12-12 | End: 2024-12-13 | Stop reason: HOSPADM

## 2024-12-12 RX ADMIN — SODIUM CHLORIDE 1000 ML: 9 INJECTION, SOLUTION INTRAVENOUS at 16:06

## 2024-12-12 RX ADMIN — IOPAMIDOL 100 ML: 612 INJECTION, SOLUTION INTRAVENOUS at 17:49

## 2024-12-12 RX ADMIN — MORPHINE SULFATE 4 MG: 4 INJECTION, SOLUTION INTRAMUSCULAR; INTRAVENOUS at 20:46

## 2024-12-12 RX ADMIN — PIPERACILLIN AND TAZOBACTAM 4.5 G: 4; .5 INJECTION, POWDER, FOR SOLUTION INTRAVENOUS at 19:49

## 2024-12-12 RX ADMIN — ONDANSETRON 4 MG: 2 INJECTION INTRAMUSCULAR; INTRAVENOUS at 20:45

## 2024-12-12 RX ADMIN — NICOTINE 1 PATCH: 21 PATCH TRANSDERMAL at 20:20

## 2024-12-12 NOTE — Clinical Note
Patient will be admitted to [facility].  Dr. Herrera is admitting physician

## 2024-12-12 NOTE — ED PROVIDER NOTES
Pt Name: Ozzie Le  MRN: [id number]  : 1990  Date of Encounter: 2024    PCP: Sean Fernandez MD      Subjective    History of Present Illness:    Chief Complaint: Abdominal pain, low back pain, dark tarry stools.    History of Present Illness: Ozzie Le is a 34 y.o. male who presents to the ER complaining of abdominal pain, low back pain, dark tarry stools that started 2 to 3 days ago and is progressively worsened over the interval.  Patient was seen and examined diagnosed with persistent asthma with exacerbation and diverticulitis on 2024.  Patient states he is gotten his antibiotic and is taking is appropriately prescribed but states that he is laying in bed trying to get his stomach to stop hurting and is now starting to have low back pain has had an episode of dark tarry stools earlier today..  Pain is described as Dull, Constant, and  Radiates to low back   Patient rates pain as a 7 on a ten scale.    Triage Vitals:    ED Triage Vitals [24 1534]   Temp Heart Rate Resp BP SpO2   97.6 °F (36.4 °C) 83 18 132/82 95 %      Temp src Heart Rate Source Patient Position BP Location FiO2 (%)   Oral Monitor -- Left arm --       Nurses Notes reviewed and agree, including vitals, allergies, social history and prior medical history.     Patient has no known allergies.    Past Medical History:   Diagnosis Date    Abscess of sigmoid colon due to diverticulitis 10/30/2023    Depression     Diverticulitis     Hypertension     Polycythemia vera     Primary erythrocytosis 2023       Past Surgical History:   Procedure Laterality Date    DENTAL PROCEDURE      HERNIA REPAIR      TONSILLECTOMY         Social History     Socioeconomic History    Marital status: Legally    Tobacco Use    Smoking status: Every Day     Current packs/day: 1.00     Average packs/day: 1 pack/day for 15.0 years (15.0 ttl pk-yrs)     Types: Cigarettes    Smokeless tobacco: Never   Vaping Use     Vaping status: Never Used   Substance and Sexual Activity    Alcohol use: Not Currently    Drug use: Never    Sexual activity: Defer       Family History   Problem Relation Age of Onset    Cancer Mother     Kidney disease Mother     Heart disease Father     Polycythemia Father        REVIEW OF SYSTEMS:     All systems reviewed and not pertinent unless noted.    Review of Systems   Gastrointestinal:  Positive for abdominal pain, blood in stool and nausea.   Musculoskeletal:  Positive for back pain.       Objective    Physical Exam  Vitals and nursing note reviewed.   Constitutional:       Appearance: Normal appearance.   HENT:      Head: Normocephalic and atraumatic.   Eyes:      Extraocular Movements: Extraocular movements intact.      Pupils: Pupils are equal, round, and reactive to light.   Cardiovascular:      Rate and Rhythm: Normal rate and regular rhythm.      Pulses: Normal pulses.      Heart sounds: Normal heart sounds.   Pulmonary:      Effort: Pulmonary effort is normal.      Breath sounds: Normal breath sounds.   Abdominal:      General: Bowel sounds are normal.      Palpations: Abdomen is soft.      Tenderness:  in the right lower quadrant, suprapubic area and left lower quadrant   Musculoskeletal:         General: Normal range of motion.      Cervical back: Normal range of motion and neck supple.   Skin:     General: Skin is warm and dry.      Capillary Refill: Capillary refill takes less than 2 seconds.   Neurological:      Mental Status: He is alert.      GCS: GCS eye subscore is 4. GCS verbal subscore is 5. GCS motor subscore is 6.      Sensory: Sensation is intact.      Motor: Motor function is intact.   Psychiatric:         Attention and Perception: Attention and perception normal.         Mood and Affect: Mood and affect normal.         Speech: Speech normal.                           Procedures    ED Course:    No orders to display            Orders placed during this visit:    Orders Placed This  Encounter   Procedures    Blood Culture With DRE - Blood,    Blood Culture With DRE - Blood,    CT Abdomen Pelvis With Contrast    [place] Draw    Comprehensive Metabolic Panel    Lipase    Urinalysis With Microscopic If Indicated (No Culture) - Urine, Clean Catch    CBC Auto Differential    Occult Blood X 1, Stool - Stool, Per Rectum    NPO Diet NPO Type: Strict NPO    Undress & Gown    Insert Peripheral IV    Insert Peripheral IV    CBC & Differential    Green Top (Gel)    Lavender Top    Gold Top - SST    Light Blue Top       LAB Results:    Lab Results (last 24 hours)       Procedure Component Value Units Date/Time    CBC & Differential [[id number]]  (Abnormal) Collected: 12/12/24 1550    Specimen: Blood Updated: 12/12/24 1558    Narrative:      The following orders were created for panel order CBC & Differential.  Procedure                               Abnormality         Status                     ---------                               -----------         ------                     CBC Auto Differential[[id number]]        Abnormal            Final result                 Please view results for these tests on the individual orders.    Comprehensive Metabolic Panel [[id number]]  (Abnormal) Collected: 12/12/24 1550    Specimen: Blood Updated: 12/12/24 1616     Glucose 128 mg/dL      BUN 10 mg/dL      Creatinine 1.01 mg/dL      Sodium 139 mmol/L      Potassium 4.1 mmol/L      Chloride 101 mmol/L      CO2 24.7 mmol/L      Calcium 8.7 mg/dL      Total Protein 6.8 g/dL      Albumin 3.7 g/dL      ALT (SGPT) 18 U/L      AST (SGOT) 13 U/L      Alkaline Phosphatase 71 U/L      Total Bilirubin 0.4 mg/dL      Globulin 3.1 gm/dL      A/G Ratio 1.2 g/dL      BUN/Creatinine Ratio 9.9     Anion Gap 13.3 mmol/L      eGFR 100.7 mL/min/1.73     Narrative:      GFR Categories in Chronic Kidney Disease (CKD)      GFR Category          GFR (mL/min/1.73)    Interpretation  G1                     90 or greater         Normal or high  (1)  G2                      60-89                Mild decrease (1)  G3a                   45-59                Mild to moderate decrease  G3b                   30-44                Moderate to severe decrease  G4                    15-29                Severe decrease  G5                    14 or less           Kidney failure          (1)In the absence of evidence of kidney disease, neither GFR category G1 or G2 fulfill the criteria for CKD.    eGFR calculation 2021 CKD-EPI creatinine equation, which does not include race as a factor    Lipase [[id number]]  (Normal) Collected: 12/12/24 1550    Specimen: Blood Updated: 12/12/24 1616     Lipase 16 U/L     CBC Auto Differential [[id number]]  (Abnormal) Collected: 12/12/24 1550    Specimen: Blood Updated: 12/12/24 1558     WBC 14.86 10*3/mm3      RBC 5.58 10*6/mm3      Hemoglobin 15.3 g/dL      Hematocrit 46.3 %      MCV 83.0 fL      MCH 27.4 pg      MCHC 33.0 g/dL      RDW 13.2 %      RDW-SD 39.9 fl      MPV 11.0 fL      Platelets 224 10*3/mm3      Neutrophil % 86.8 %      Lymphocyte % 5.5 %      Monocyte % 4.4 %      Eosinophil % 2.7 %      Basophil % 0.3 %      Immature Grans % 0.3 %      Neutrophils, Absolute 12.90 10*3/mm3      Lymphocytes, Absolute 0.82 10*3/mm3      Monocytes, Absolute 0.65 10*3/mm3      Eosinophils, Absolute 0.40 10*3/mm3      Basophils, Absolute 0.04 10*3/mm3      Immature Grans, Absolute 0.05 10*3/mm3      nRBC 0.0 /100 WBC     Occult Blood X 1, Stool - Stool, Per Rectum [[id number]]  (Normal) Collected: 12/12/24 1607    Specimen: Stool from Per Rectum Updated: 12/12/24 1611     Fecal Occult Blood Negative    Urinalysis With Microscopic If Indicated (No Culture) - Urine, Clean Catch [[id number]]  (Abnormal) Collected: 12/12/24 1642    Specimen: Urine, Clean Catch Updated: 12/12/24 1654     Color, UA Yellow     Appearance, UA Clear     pH, UA 6.0     Specific Gravity, UA 1.022     Glucose, UA Negative     Ketones, UA Trace     Bilirubin, UA  Negative     Blood, UA Negative     Protein, UA Negative     Leuk Esterase, UA Negative     Nitrite, UA Negative     Urobilinogen, UA 1.0 E.U./dL    Narrative:      Urine microscopic not indicated.    Blood Culture With DRE - Blood, Hand, Left [[id number]] Collected: 12/12/24 1938    Specimen: Blood from Hand, Left Updated: 12/12/24 1951    Blood Culture With DRE - Blood, Arm, Left [[id number]] Collected: 12/12/24 1948    Specimen: Blood from Arm, Left Updated: 12/12/24 1951             If labs were ordered, I have independently reviewed the results and considered them in the diagnosis and treatment plan for the patient    RADIOLOGY    CT Abdomen Pelvis With Contrast    Addendum Date: 12/12/2024    ADDENDUM REPORT ADDENDUM: Receipt of this report by the clinical staff was confirmed with Estelita English RN on Dec 12, 2024 19:28:00 EST. Authenticated and Electronically Signed by Dorie Stevens MD on 12/12/2024 07:28:43 PM    Result Date: 12/12/2024  FINAL REPORT TECHNIQUE: null CLINICAL HISTORY: Abdominal pain COMPARISON: null FINDINGS: CT abdomen and pelvis with contrast Comparison: None Findings: No consolidation at the lung bases. Unremarkable gallbladder and bladder. Subcentimeter low attenuating lesion in the liver which is too small to characterize. The other solid organs are unremarkable. There are dilated loops of small bowel which measure up to 3.5 cm in the mid abdomen. The dilated loops of small bowel are thick-walled and appear to be tethered to one another into a rim enhancing fluid collection which measures 5.1 x 2.7 x 3.4 cm (measured on series 2, image 65 and series 3, image 54). A normal appendix is identified. Colonic diverticulosis. Inferior to the abnormal loops of small bowel and rim enhancing fluid collection there is a collection of fluid and air with peripheral enhancement measuring 3.7 x 2.3 x 3.8 cm (series 2 images 71 through 78 and series 3 images 46 through 52). This abscess is likely  in continuity with the more superior abscess adjacent to the abnormal small bowel. This is adjacent to the sigmoid colon. There is inflammation of the diverticula with pericolonic stranding. No remote free air. Normal vasculature. No lymphadenopathy. Small ascites. No acute osseous abnormality.     Impression: Impression: Suspect acute to subacute sigmoid diverticulitis with an adjacent abscess measuring 3.8 cm. The abscess contains air which may indicate a contained microperforation. No remote free air. The pericolonic abscess is likely in continuity with an abscess in the mid abdomen which is adjacent to abnormal loops of small bowel. The mid abdominal abscess measures 5.1 cm. The small bowel is dilated and thick-walled; favor ileus with reactive wall thickening. Authenticated and Electronically Signed by Dorie Stevens MD on 12/12/2024 07:05:19 PM      If I have ordered, I have independently reviewed the above noted radiographic studies.  Please see the radiologist dictation for the official interpretation    Medications given to patient in the ER    Medications   sodium chloride 0.9 % flush 10 mL (has no administration in time range)   sodium chloride 0.9 % flush 10 mL (has no administration in time range)   piperacillin-tazobactam (ZOSYN) IVPB 4.5 g IVPB in 100 mL NS (VTB) (4.5 g Intravenous New Bag 12/12/24 1949)   nicotine (NICODERM CQ) 21 MG/24HR patch 1 patch (has no administration in time range)   sodium chloride 0.9 % bolus 1,000 mL (0 mL Intravenous Stopped 12/12/24 1832)   iopamidol (ISOVUE-300) 61 % injection 100 mL (100 mL Intravenous Given 12/12/24 1749)   Pharmacy to Dose Zosyn ( Not Applicable Given 12/12/24 1934)       AS OF 20:13 EST VITALS:    BP - 127/69  HR - 69  TEMP - 97.6 °F (36.4 °C) (Oral)  O2 SATS - 96%         Shared Decision Making: After my consideration of the clinical presentation and laboratory/radiology studies obtained, I have discussed the findings with the patient/patient  representative who is in agreement with the treatment plan and final disposition. Risks and benefits of discharge and/or observation admission were discussed.  Final disposition of the patient will be admission.  Patient is requested to follow-up with primary care provider and specialist in 1 week following final discharge.      Medical Decision Making    Patient's care was transferred to me from [facility] at 5:30 PM pending CT abdomen and pelvis read.  Below is my medical decision making based on laboratory workup and CT scans.  -Melecio Dolan PA-C    Patient is a 34-year-old male that presented to the ER for evaluation of continued lower abdominal pain with radiation of pain to his low back.  On arrival patient's vitals and pulse oximetry as independently interpreted by me are all stable and within normal limits he has generalized bilateral lower abdominal tenderness to my exam, no CVA tenderness bilaterally.  Plan was for laboratory workup and CT abdomen and pelvis scan.  Of note patient diagnosed with diverticulitis and had a CT abdomen and pelvis performed 3 days ago that showed diverticulitis and small area of free air but no abscess.    CBC did today shows a leukocytosis of 15,000 which is actually an improvement from 19,003 days ago.  Hemoccult test negative.  Lipase normal lowering suspicion for pancreatitis CMP clinically unremarkable and urinalysis no evidence of infection.  However CT abdomen and pelvis scan performed today did show worsening intra-abdominal condition with 2 intra-abdominal abscesses noted and some nearby air.  I consulted with general surgeon on-call Dr. Bloom who recommended admission, IV antibiotics and general surgery consult while admitted.  Patient was admitted to [facility] after communicating with hospital medicine Dr. Herrera.        Final diagnoses:   Diverticulitis   Intra-abdominal abscess       Please note that portions of this document  were completed using voice recognition dictation software.       Melecio Dolan PA-C  12/12/24 2016       Melecio Dolan PA-C  12/12/24 2255

## 2024-12-13 ENCOUNTER — READMISSION MANAGEMENT (OUTPATIENT)
Dept: CALL CENTER | Facility: HOSPITAL | Age: 34
End: 2024-12-13
Payer: MEDICAID

## 2024-12-13 NOTE — PAYOR COMM NOTE
"To:  [facility]  From: Edda Jaime RN  Phone: [phone]  Fax: [phone]  NPI: [id number]  TIN: [id number]  Member ID: [id number]   MRN: [id number]    Mario Alberto Ferrera (34 y.o. Male)       Date of Birth   1990    Social Security Number       Address   [address] [place], [place] [address]    Home Phone   [phone]    MRN   [id number]       D[place]ze   None    Marital Status   Legally                             Admission Date   24    Admission Type   Emergency    Admitting Provider   Hair Herrera DO    Attending Provider       Department, Room/Bed   [facility] TELEMETRY SDS OVERFLOW, T0       Discharge Date   2024    Discharge Disposition   Home or Self Care    Discharge Destination   Home                              Attending Provider: (none)   Allergies: No Known Allergies    Isolation: None   Infection: None   Code Status: Prior    Ht: 190.5 cm (75\")   Wt: 145 kg (320 lb)    Admission Cmt: None   Principal Problem: Diverticulitis [K57.92]                   Active Insurance as of 2024       Primary Coverage       Payor Plan Insurance Group Employer/Plan Group    [facility] COMMUNITY PLAN [facility] COMMUNITY PLAN [facility]       Payor Plan Address Payor Plan Phone Number Payor Plan Fax Number Effective Dates    [address]   2021 - None Entered    [facility] [id number]         Subscriber Name Subscriber Birth Date Member ID       MARIO ALBERTO FERRERA 1990 [id number]                     Emergency Contacts        (Rel.) Home Phone Work Phone Mobile Phone    Damaris Ferrera (Spouse) [phone] -- [phone]                 History & Physical        Hair Herrera DO at 24 2031            [facility] HOSPITALIST   HISTORY AND PHYSICAL      Name:  Mario Alberto Ferrera   Age:  34 y.o.  Sex:  male  :  1990  MRN:  [id number]   Visit Number:  [id number]  Admission Date:  2024  Date Of " Service:  12/12/24  Primary Care Physician:  Sean Fernandez MD    Chief Complaint:     Abdominal pain    History Of Presenting Illness:      Patient is an obese 34-year-old male with history significant for polycythemia vera who presents to the emergency room with worsening abdominal pain.  Patient initially presented 12/9/2024 and diagnosed with diverticulitis, discharged home with Augmentin.  Patient states however that pain has continued to worsen prompting him to return.    ED summary: Patient afebrile, hemodynamically stable, nonhypoxic on room air.  CMP remarkable for glucose 128.  WBC 14.  UA unremarkable.  CT abdomen pelvisSuspect acute to subacute sigmoid diverticulitis with an adjacent abscess measuring 3.8 cm. The abscess contains air which may indicate a contained microperforation. No remote free air. The pericolonic abscess is likely in continuity with an abscess in the mid abdomen which is adjacent to abnormal loops of small bowel. The mid abdominal abscess measures 5.1 cm. The small bowel is dilated and thick-walled; favor ileus with reactive wall thickening.  General surgeon, Dr. Bloom agree to consult.  Hospitalist asked to admit.    Review Of Systems:    All systems were reviewed and negative except as mentioned in history of presenting illness, assessment and plan.    Past Medical History: Patient  has a past medical history of Abscess of sigmoid colon due to diverticulitis (10/30/2023), Depression, Diverticulitis, Hypertension, Polycythemia vera, and Primary erythrocytosis (04/27/2023).    Past Surgical History: Patient  has a past surgical history that includes Dental surgery; Tonsillectomy; and Hernia repair.    Social History: Patient  reports that he has been smoking cigarettes. He has a 15 pack-year smoking history. He has never used smokeless tobacco. He reports that he does not currently use alcohol. He reports that he does not use drugs.    Family History:  Patient's family history has  "been reviewed and found to be noncontributory.     Allergies:      Patient has no known allergies.    Home Medications:    Prior to Admission Medications       Prescriptions Last Dose Informant Patient Reported? Taking?    albuterol sulfate  (90 Base) MCG/ACT inhaler   No No    Inhale 2 puffs Every 4 (Four) Hours As Needed for Wheezing or Shortness of Air.    amoxicillin-clavulanate (AUGMENTIN) 875-125 MG per tablet   No No    Take 1 tablet by mouth 2 (Two) Times a Day for 7 days.    clopidogrel (PLAVIX) 75 MG tablet   Yes No    Take 1 tablet by mouth Daily.    Fluticasone-Umeclidin-Vilant (TRELEGY ELLIPTA) 200-62.5-25 MCG/ACT inhaler   No No    Inhale 1 puff Daily. Rinse mouth with water after use.    predniSONE (DELTASONE) 20 MG tablet   No No    Take 2 tablets by mouth Daily for 5 days.          ED Medications:    Medications   sodium chloride 0.9 % flush 10 mL (has no administration in time range)   sodium chloride 0.9 % flush 10 mL (has no administration in time range)   nicotine (NICODERM CQ) 21 MG/24HR patch 1 patch (1 patch Transdermal Medication Applied 12/12/24 2020)   sodium chloride 0.9 % bolus 1,000 mL (0 mL Intravenous Stopped 12/12/24 1832)   iopamidol (ISOVUE-300) 61 % injection 100 mL (100 mL Intravenous Given 12/12/24 1749)   Pharmacy to Dose Zosyn ( Not Applicable Given 12/12/24 1934)   piperacillin-tazobactam (ZOSYN) IVPB 4.5 g IVPB in 100 mL NS (VTB) (0 g Intravenous Stopped 12/12/24 2021)     Vital Signs:  Temp:  [97.6 °F (36.4 °C)] 97.6 °F (36.4 °C)  Heart Rate:  [69-83] 69  Resp:  [18] 18  BP: (117-132)/(68-82) 117/73        12/12/24  1534   Weight: (!) 145 kg (320 lb)     Body mass index is 40 kg/m².    Physical Exam:     Most recent vital Signs: /73   Pulse 69   Temp 97.6 °F (36.4 °C) (Oral)   Resp 18   Ht 190.5 cm (75\")   Wt (!) 145 kg (320 lb)   SpO2 95%   BMI 40.00 kg/m²     Physical Exam  Vitals reviewed.   Constitutional:       General: He is not in acute " distress.     Appearance: He is obese.   HENT:      Head: Normocephalic and atraumatic.      Right Ear: External ear normal.      Left Ear: External ear normal.      Mouth/Throat:      Mouth: Mucous membranes are moist.      Pharynx: Oropharynx is clear.   Eyes:      Extraocular Movements: Extraocular movements intact.      Conjunctiva/sclera: Conjunctivae normal.   Cardiovascular:      Rate and Rhythm: Normal rate and regular rhythm.      Pulses: Normal pulses.      Heart sounds: Normal heart sounds.   Pulmonary:      Effort: Pulmonary effort is normal.      Breath sounds: Normal breath sounds.   Abdominal:      General: Bowel sounds are normal.      Palpations: Abdomen is soft.      Tenderness: There is abdominal tenderness.   Musculoskeletal:         General: Normal range of motion.   Skin:     General: Skin is warm and dry.   Neurological:      General: No focal deficit present.      Mental Status: He is alert and oriented to person, place, and time.         Laboratory data:    I have reviewed the labs done in the emergency room.    Results from last 7 days   Lab Units 12/12/24  1550 12/09/24  1130   SODIUM mmol/L 139 137   POTASSIUM mmol/L 4.1 4.2   CHLORIDE mmol/L 101 102   CO2 mmol/L 24.7 24.6   BUN mg/dL 10 12   CREATININE mg/dL 1.01 1.14   CALCIUM mg/dL 8.7 9.0   BILIRUBIN mg/dL 0.4 0.8   ALK PHOS U/L 71 76   ALT (SGPT) U/L 18 20   AST (SGOT) U/L 13 14   GLUCOSE mg/dL 128* 133*     Results from last 7 days   Lab Units 12/12/24  1550 12/09/24  1130   WBC 10*3/mm3 14.86* 19.08*   HEMOGLOBIN g/dL 15.3 15.6   HEMATOCRIT % 46.3 47.1   PLATELETS 10*3/mm3 224 167                     Results from last 7 days   Lab Units 12/12/24  1550   LIPASE U/L 16         Results from last 7 days   Lab Units 12/12/24  1642   COLOR UA  Yellow   GLUCOSE UA  Negative   KETONES UA  Trace*   BLOOD UA  Negative   LEUKOCYTES UA  Negative   PH, URINE  6.0   BILIRUBIN UA  Negative   UROBILINOGEN UA  1.0 E.U./dL       Pain Management  Panel           No data to display                EKG:          Radiology:    CT Abdomen Pelvis With Contrast    Addendum Date: 12/12/2024    ADDENDUM REPORT ADDENDUM: Receipt of this report by the clinical staff was confirmed with Estelita English RN on Dec 12, 2024 19:28:00 EST. Authenticated and Electronically Signed by Dorie Stevens MD on 12/12/2024 07:28:43 PM    Result Date: 12/12/2024  FINAL REPORT TECHNIQUE: null CLINICAL HISTORY: Abdominal pain COMPARISON: null FINDINGS: CT abdomen and pelvis with contrast Comparison: None Findings: No consolidation at the lung bases. Unremarkable gallbladder and bladder. Subcentimeter low attenuating lesion in the liver which is too small to characterize. The other solid organs are unremarkable. There are dilated loops of small bowel which measure up to 3.5 cm in the mid abdomen. The dilated loops of small bowel are thick-walled and appear to be tethered to one another into a rim enhancing fluid collection which measures 5.1 x 2.7 x 3.4 cm (measured on series 2, image 65 and series 3, image 54). A normal appendix is identified. Colonic diverticulosis. Inferior to the abnormal loops of small bowel and rim enhancing fluid collection there is a collection of fluid and air with peripheral enhancement measuring 3.7 x 2.3 x 3.8 cm (series 2 images 71 through 78 and series 3 images 46 through 52). This abscess is likely in continuity with the more superior abscess adjacent to the abnormal small bowel. This is adjacent to the sigmoid colon. There is inflammation of the diverticula with pericolonic stranding. No remote free air. Normal vasculature. No lymphadenopathy. Small ascites. No acute osseous abnormality.     Impression: Suspect acute to subacute sigmoid diverticulitis with an adjacent abscess measuring 3.8 cm. The abscess contains air which may indicate a contained microperforation. No remote free air. The pericolonic abscess is likely in continuity with an abscess in  the mid abdomen which is adjacent to abnormal loops of small bowel. The mid abdominal abscess measures 5.1 cm. The small bowel is dilated and thick-walled; favor ileus with reactive wall thickening. Authenticated and Electronically Signed by Dorie Stevens MD on 12/12/2024 07:05:19 PM    CT Abdomen Pelvis With Contrast    Result Date: 12/9/2024  PROCEDURE: CT ABDOMEN PELVIS W CONTRAST-  HISTORY:  Diffuse abdominal pain, history of diverticulitis, eval diverticulitis  COMPARISON: November 9, 2024.  TECHNIQUE: Multiple axial CT images were obtained from the lung bases through the pubic symphysis following the administration of Isovue 300 contrast.  FINDINGS:  ABDOMEN: The liver shows fatty infiltration. The gallbladder is present. There is no CT evidence of gallstones. The spleen is unremarkable. No adrenal mass is present.  The pancreas is normal. The kidneys are normal. The aorta is normal in caliber.  PELVIS: The appendix is retrocecal and appears unremarkable. There are multiple diverticuli. There is wall thickening and significant infiltration of surrounding fat of the mid sigmoid colon. A loop of ileum seen located superior and to the right demonstrates significant wall thickening and significant infiltration of the surrounding fat, likely reactive. Air-fluid levels are seen in nondistended small bowel loops likely due to ileus from inflammatory process. A few bubbles of extraluminal air are seen between the sigmoid colon and ileum on image #49 of series 3 and image #82 of series 2. No rim-enhancing fluid collection is seen to suggest abscess. There is a minimal amount of free fluid in the pelvis. Prostate is normal in size. The urinary bladder is unremarkable.         1. Mid sigmoid diverticulitis with small bubbles of extraluminal air. No juvenal abscess formation. 2. Inflammatory changes seen in an adjacent loop of ileum. 3. Bowel gas pattern suggestive of ileus.   CTDI: 17.05 mGy DLP: 1444.41 mGy.cm   This  study was performed with techniques to keep radiation doses as low as reasonably achievable (ALARA). Individualized dose reduction techniques using automated exposure control or adjustment of mA and/or kV according to the patient size were employed.      Images were reviewed, interpreted, and dictated by Dr. Juanita Agosto MD Transcribed by Torri Chiang PA-C.  This report was signed and finalized on 12/9/2024 2:16 PM by Juanita Agosto MD.      CT Angiogram Chest Pulmonary Embolism    Result Date: 12/9/2024  PROCEDURE: CT ANGIOGRAM CHEST PULMONARY EMBOLISM-  HISTORY: Shortness of breath, tachycardic, eval PE, bacterial pneumonia  COMPARISON: None .  TECHNIQUE: Multiple axial CT images were obtained from the thoracic inlet through the upper abdomen following the administration of Isovue 300 per the CT PE protocol. Coronal and oblique 3D MIP images were reconstructed from the original axial data set.  FINDINGS: There are no filling defects within the pulmonary arteries to suggest an embolus. The thoracic aorta is normal in caliber with no evidence of dissection. The heart is normal in size. There are no pleural or pericardial effusions. There is no adenopathy.  Lung windows reveal no focal opacities or suspicious pulmonary nodules.      No evidence of pulmonary embolus or dissection.     DLP: 1444.41 mGy.cm CTDI: 17.05 mGy   This study was performed with techniques to keep radiation doses as low as reasonably achievable (ALARA). Individualized dose reduction techniques using automated exposure control or adjustment of mA and/or kV according to the patient size were employed.     Images were reviewed, interpreted, and dictated by Dr. Juanita Agosto MD Transcribed by Torri Chiang PA-C.  This report was signed and finalized on 12/9/2024 2:03 PM by Juanita Agosto MD.      XR Chest 1 View    Result Date: 12/9/2024  PROCEDURE: XR CHEST 1 VW-    HISTORY: Shortness of breath  COMPARISON: July 2021.  FINDINGS: Apical lordotic  view. The heart is normal in size. The mediastinum is unremarkable. The lungs are clear. There is no pneumothorax. There are no acute osseous abnormalities.      No acute cardiopulmonary process.        Images were reviewed, interpreted, and dictated by Dr. Juanita Agosto MD Transcribed by Torri Chiang PA-C.  This report was signed and finalized on 2024 12:35 PM by Juanita Agosto MD.       Assessment:    Acute diverticulitis with abscesses  Polycythemia vera  Obesity    Plan:    Acute diverticulitis with abscesses  Dr. Bloom, general surgery consulted  Zosyn  Antiemetics and pain control as needed  Polycythemia vera  Holding Plavix  Patient scheduled to undergo therapeutic phlebotomy .  May need to reschedule as I am not certain that we can do this while inpatient.    Risk Assessment: High  DVT Prophylaxis: Lovenox  Code Status: Full  Diet: Clears               Hair Herrera DO  24  20:31 EST    Dictated utilizing Dragon dictation.      Electronically signed by Hair Herrera DO at 24 2226          Emergency Department Notes        Melecio Dolan PA-C at 24 1609          Pt Name: Ozzie Le  MRN: [id number]  : 1990  Date of Encounter: 2024    PCP: Sean Fernandez MD      Subjective    History of Present Illness:    Chief Complaint: Abdominal pain, low back pain, dark tarry stools.    History of Present Illness: Ozzie Le is a 34 y.o. male who presents to the ER complaining of abdominal pain, low back pain, dark tarry stools that started 2 to 3 days ago and is progressively worsened over the interval.  Patient was seen and examined diagnosed with persistent asthma with exacerbation and diverticulitis on 2024.  Patient states he is gotten his antibiotic and is taking is appropriately prescribed but states that he is laying in bed trying to get his stomach to stop hurting and is now starting to have low back pain has had an episode of dark tarry  stools earlier today..  Pain is described as Dull, Constant, and  Radiates to low back   Patient rates pain as a 7 on a ten scale.    Triage Vitals:    ED Triage Vitals [12/12/24 1534]   Temp Heart Rate Resp BP SpO2   97.6 °F (36.4 °C) 83 18 132/82 95 %      Temp src Heart Rate Source Patient Position BP Location FiO2 (%)   Oral Monitor -- Left arm --       Nurses Notes reviewed and agree, including vitals, allergies, social history and prior medical history.     Patient has no known allergies.    Past Medical History:   Diagnosis Date    Abscess of sigmoid colon due to diverticulitis 10/30/2023    Depression     Diverticulitis     Hypertension     Polycythemia vera     Primary erythrocytosis 04/27/2023       Past Surgical History:   Procedure Laterality Date    DENTAL PROCEDURE      HERNIA REPAIR      TONSILLECTOMY         Social History     Socioeconomic History    Marital status: Legally    Tobacco Use    Smoking status: Every Day     Current packs/day: 1.00     Average packs/day: 1 pack/day for 15.0 years (15.0 ttl pk-yrs)     Types: Cigarettes    Smokeless tobacco: Never   Vaping Use    Vaping status: Never Used   Substance and Sexual Activity    Alcohol use: Not Currently    Drug use: Never    Sexual activity: Defer       Family History   Problem Relation Age of Onset    Cancer Mother     Kidney disease Mother     Heart disease Father     Polycythemia Father        REVIEW OF SYSTEMS:     All systems reviewed and not pertinent unless noted.    Review of Systems   Gastrointestinal:  Positive for abdominal pain, blood in stool and nausea.   Musculoskeletal:  Positive for back pain.       Objective    Physical Exam  Vitals and nursing note reviewed.   Constitutional:       Appearance: Normal appearance.   HENT:      Head: Normocephalic and atraumatic.   Eyes:      Extraocular Movements: Extraocular movements intact.      Pupils: Pupils are equal, round, and reactive to light.   Cardiovascular:      Rate  and Rhythm: Normal rate and regular rhythm.      Pulses: Normal pulses.      Heart sounds: Normal heart sounds.   Pulmonary:      Effort: Pulmonary effort is normal.      Breath sounds: Normal breath sounds.   Abdominal:      General: Bowel sounds are normal.      Palpations: Abdomen is soft.      Tenderness:  in the right lower quadrant, suprapubic area and left lower quadrant   Musculoskeletal:         General: Normal range of motion.      Cervical back: Normal range of motion and neck supple.   Skin:     General: Skin is warm and dry.      Capillary Refill: Capillary refill takes less than 2 seconds.   Neurological:      Mental Status: He is alert.      GCS: GCS eye subscore is 4. GCS verbal subscore is 5. GCS motor subscore is 6.      Sensory: Sensation is intact.      Motor: Motor function is intact.   Psychiatric:         Attention and Perception: Attention and perception normal.         Mood and Affect: Mood and affect normal.         Speech: Speech normal.                           Procedures    ED Course:    No orders to display            Orders placed during this visit:    Orders Placed This Encounter   Procedures    Blood Culture With DRE - Blood,    Blood Culture With DRE - Blood,    CT Abdomen Pelvis With Contrast    [place] Draw    Comprehensive Metabolic Panel    Lipase    Urinalysis With Microscopic If Indicated (No Culture) - Urine, Clean Catch    CBC Auto Differential    Occult Blood X 1, Stool - Stool, Per Rectum    NPO Diet NPO Type: Strict NPO    Undress & Gown    Insert Peripheral IV    Insert Peripheral IV    CBC & Differential    Green Top (Gel)    Lavender Top    Gold Top - SST    Light Blue Top       LAB Results:    Lab Results (last 24 hours)       Procedure Component Value Units Date/Time    CBC & Differential [[id number]]  (Abnormal) Collected: 12/12/24 1550    Specimen: Blood Updated: 12/12/24 8108    Narrative:      The following orders were created for panel order CBC &  Differential.  Procedure                               Abnormality         Status                     ---------                               -----------         ------                     CBC Auto Differential[[id number]]        Abnormal            Final result                 Please view results for these tests on the individual orders.    Comprehensive Metabolic Panel [[id number]]  (Abnormal) Collected: 12/12/24 1550    Specimen: Blood Updated: 12/12/24 1616     Glucose 128 mg/dL      BUN 10 mg/dL      Creatinine 1.01 mg/dL      Sodium 139 mmol/L      Potassium 4.1 mmol/L      Chloride 101 mmol/L      CO2 24.7 mmol/L      Calcium 8.7 mg/dL      Total Protein 6.8 g/dL      Albumin 3.7 g/dL      ALT (SGPT) 18 U/L      AST (SGOT) 13 U/L      Alkaline Phosphatase 71 U/L      Total Bilirubin 0.4 mg/dL      Globulin 3.1 gm/dL      A/G Ratio 1.2 g/dL      BUN/Creatinine Ratio 9.9     Anion Gap 13.3 mmol/L      eGFR 100.7 mL/min/1.73     Narrative:      GFR Categories in Chronic Kidney Disease (CKD)      GFR Category          GFR (mL/min/1.73)    Interpretation  G1                     90 or greater         Normal or high (1)  G2                      60-89                Mild decrease (1)  G3a                   45-59                Mild to moderate decrease  G3b                   30-44                Moderate to severe decrease  G4                    15-29                Severe decrease  G5                    14 or less           Kidney failure          (1)In the absence of evidence of kidney disease, neither GFR category G1 or G2 fulfill the criteria for CKD.    eGFR calculation 2021 CKD-EPI creatinine equation, which does not include race as a factor    Lipase [[id number]]  (Normal) Collected: 12/12/24 1550    Specimen: Blood Updated: 12/12/24 1616     Lipase 16 U/L     CBC Auto Differential [[id number]]  (Abnormal) Collected: 12/12/24 1550    Specimen: Blood Updated: 12/12/24 1558     WBC 14.86 10*3/mm3      RBC 5.58  10*6/mm3      Hemoglobin 15.3 g/dL      Hematocrit 46.3 %      MCV 83.0 fL      MCH 27.4 pg      MCHC 33.0 g/dL      RDW 13.2 %      RDW-SD 39.9 fl      MPV 11.0 fL      Platelets 224 10*3/mm3      Neutrophil % 86.8 %      Lymphocyte % 5.5 %      Monocyte % 4.4 %      Eosinophil % 2.7 %      Basophil % 0.3 %      Immature Grans % 0.3 %      Neutrophils, Absolute 12.90 10*3/mm3      Lymphocytes, Absolute 0.82 10*3/mm3      Monocytes, Absolute 0.65 10*3/mm3      Eosinophils, Absolute 0.40 10*3/mm3      Basophils, Absolute 0.04 10*3/mm3      Immature Grans, Absolute 0.05 10*3/mm3      nRBC 0.0 /100 WBC     Occult Blood X 1, Stool - Stool, Per Rectum [[id number]]  (Normal) Collected: 12/12/24 1607    Specimen: Stool from Per Rectum Updated: 12/12/24 1611     Fecal Occult Blood Negative    Urinalysis With Microscopic If Indicated (No Culture) - Urine, Clean Catch [[id number]]  (Abnormal) Collected: 12/12/24 1642    Specimen: Urine, Clean Catch Updated: 12/12/24 1654     Color, UA Yellow     Appearance, UA Clear     pH, UA 6.0     Specific Gravity, UA 1.022     Glucose, UA Negative     Ketones, UA Trace     Bilirubin, UA Negative     Blood, UA Negative     Protein, UA Negative     Leuk Esterase, UA Negative     Nitrite, UA Negative     Urobilinogen, UA 1.0 E.U./dL    Narrative:      Urine microscopic not indicated.    Blood Culture With DRE - Blood, Hand, Left [[id number]] Collected: 12/12/24 1938    Specimen: Blood from Hand, Left Updated: 12/12/24 1951    Blood Culture With DRE - Blood, Arm, Left [[id number]] Collected: 12/12/24 1948    Specimen: Blood from Arm, Left Updated: 12/12/24 1951             If labs were ordered, I have independently reviewed the results and considered them in the diagnosis and treatment plan for the patient    RADIOLOGY    CT Abdomen Pelvis With Contrast    Addendum Date: 12/12/2024    ADDENDUM REPORT ADDENDUM: Receipt of this report by the clinical staff was confirmed with Estelita English RN on  Dec 12, 2024 19:28:00 EST. Authenticated and Electronically Signed by Dorie Stevens MD on 12/12/2024 07:28:43 PM    Result Date: 12/12/2024  FINAL REPORT TECHNIQUE: null CLINICAL HISTORY: Abdominal pain COMPARISON: null FINDINGS: CT abdomen and pelvis with contrast Comparison: None Findings: No consolidation at the lung bases. Unremarkable gallbladder and bladder. Subcentimeter low attenuating lesion in the liver which is too small to characterize. The other solid organs are unremarkable. There are dilated loops of small bowel which measure up to 3.5 cm in the mid abdomen. The dilated loops of small bowel are thick-walled and appear to be tethered to one another into a rim enhancing fluid collection which measures 5.1 x 2.7 x 3.4 cm (measured on series 2, image 65 and series 3, image 54). A normal appendix is identified. Colonic diverticulosis. Inferior to the abnormal loops of small bowel and rim enhancing fluid collection there is a collection of fluid and air with peripheral enhancement measuring 3.7 x 2.3 x 3.8 cm (series 2 images 71 through 78 and series 3 images 46 through 52). This abscess is likely in continuity with the more superior abscess adjacent to the abnormal small bowel. This is adjacent to the sigmoid colon. There is inflammation of the diverticula with pericolonic stranding. No remote free air. Normal vasculature. No lymphadenopathy. Small ascites. No acute osseous abnormality.     Impression: Impression: Suspect acute to subacute sigmoid diverticulitis with an adjacent abscess measuring 3.8 cm. The abscess contains air which may indicate a contained microperforation. No remote free air. The pericolonic abscess is likely in continuity with an abscess in the mid abdomen which is adjacent to abnormal loops of small bowel. The mid abdominal abscess measures 5.1 cm. The small bowel is dilated and thick-walled; favor ileus with reactive wall thickening. Authenticated and Electronically Signed by  Dorie Stevens MD on 12/12/2024 07:05:19 PM      If I have ordered, I have independently reviewed the above noted radiographic studies.  Please see the radiologist dictation for the official interpretation    Medications given to patient in the ER    Medications   sodium chloride 0.9 % flush 10 mL (has no administration in time range)   sodium chloride 0.9 % flush 10 mL (has no administration in time range)   piperacillin-tazobactam (ZOSYN) IVPB 4.5 g IVPB in 100 mL NS (VTB) (4.5 g Intravenous New Bag 12/12/24 1949)   nicotine (NICODERM CQ) 21 MG/24HR patch 1 patch (has no administration in time range)   sodium chloride 0.9 % bolus 1,000 mL (0 mL Intravenous Stopped 12/12/24 1832)   iopamidol (ISOVUE-300) 61 % injection 100 mL (100 mL Intravenous Given 12/12/24 1749)   Pharmacy to Dose Zosyn ( Not Applicable Given 12/12/24 1934)       AS OF 20:13 EST VITALS:    BP - 127/69  HR - 69  TEMP - 97.6 °F (36.4 °C) (Oral)  O2 SATS - 96%         Shared Decision Making: After my consideration of the clinical presentation and laboratory/radiology studies obtained, I have discussed the findings with the patient/patient representative who is in agreement with the treatment plan and final disposition. Risks and benefits of discharge and/or observation admission were discussed.  Final disposition of the patient will be admission.  Patient is requested to follow-up with primary care provider and specialist in 1 week following final discharge.      Medical Decision Making    Patient's care was transferred to me from [facility] at 5:30 PM pending CT abdomen and pelvis read.  Below is my medical decision making based on laboratory workup and CT scans.  -Melecio Dolan PA-C    Patient is a 34-year-old male that presented to the ER for evaluation of continued lower abdominal pain with radiation of pain to his low back.  On arrival patient's vitals and pulse oximetry as independently interpreted by me are all stable and  within normal limits he has generalized bilateral lower abdominal tenderness to my exam, no CVA tenderness bilaterally.  Plan was for laboratory workup and CT abdomen and pelvis scan.  Of note patient diagnosed with diverticulitis and had a CT abdomen and pelvis performed 3 days ago that showed diverticulitis and small area of free air but no abscess.    CBC did today shows a leukocytosis of 15,000 which is actually an improvement from 19,003 days ago.  Hemoccult test negative.  Lipase normal lowering suspicion for pancreatitis CMP clinically unremarkable and urinalysis no evidence of infection.  However CT abdomen and pelvis scan performed today did show worsening intra-abdominal condition with 2 intra-abdominal abscesses noted and some nearby air.  I consulted with general surgeon on-call Dr. Bloom who recommended admission, IV antibiotics and general surgery consult while admitted.  Patient was admitted to [facility] after communicating with hospital medicine Dr. Herrera.        Final diagnoses:   Diverticulitis   Intra-abdominal abscess       Please note that portions of this document were completed using voice recognition dictation software.       Melecio Dolan PA-C  12/12/24 2016       Melecio Dolan PA-C  12/12/24 2036      Electronically signed by Melecio Dolan PA-C at 12/12/24 2036       Vital Signs (last day) before discharge       Date/Time Temp Temp src Pulse Resp BP Patient Position SpO2    12/12/24 2145 97.9 (36.6) Oral 64 16 129/76 Lying 94    12/12/24 2059 -- -- -- -- 125/90 -- 93    12/12/24 20:47:34 -- -- -- -- 129/83 -- --    12/12/24 2001 -- -- -- -- 117/73 -- 95    12/12/24 1951 -- -- -- -- 127/69 -- 95    12/12/24 19:50:20 -- -- 69 -- 127/69 -- 96    12/12/24 18:54:23 -- -- -- -- -- -- 95    12/12/24 1702 -- -- -- -- 120/69 -- 94    12/12/24 1642 -- -- -- -- 130/74 -- 96    12/12/24 1602 -- -- -- -- 122/73 -- 95    12/12/24 1547 -- -- -- -- 120/68 -- --     12/12/24 1534 97.6 (36.4) Oral 83 18 132/82 -- 95          No current facility-administered medications for this encounter.     Current Outpatient Medications   Medication Sig Dispense Refill    albuterol sulfate  (90 Base) MCG/ACT inhaler Inhale 2 puffs Every 4 (Four) Hours As Needed for Wheezing or Shortness of Air. 8 g 0    amoxicillin-clavulanate (AUGMENTIN) 875-125 MG per tablet Take 1 tablet by mouth 2 (Two) Times a Day for 7 days. 14 tablet 0    clopidogrel (PLAVIX) 75 MG tablet Take 1 tablet by mouth Daily.      Fluticasone-Umeclidin-Vilant (TRELEGY ELLIPTA) 200-62.5-25 MCG/ACT inhaler Inhale 1 puff Daily. Rinse mouth with water after use. 60 each 5    predniSONE (DELTASONE) 20 MG tablet Take 2 tablets by mouth Daily for 5 days. 10 tablet 0     Lab Results (last 24 hours)       Procedure Component Value Units Date/Time    [place] Draw [[id number]] Collected: 12/12/24 1550    Specimen: Blood Updated: 12/12/24 2001    Narrative:      The following orders were created for panel order [place] Draw.  Procedure                               Abnormality         Status                     ---------                               -----------         ------                     Green Top (Gel)[[id number]]                                  Final result               Lavender Top[[id number]]                                     Final result               Gold Top - SST[[id number]]                                   Final result               Light Blue Top[[id number]]                                   Final result                 Please view results for these tests on the individual orders.    Gold Top - SST [[id number]] Collected: 12/12/24 1938    Specimen: Blood from Hand, Left Updated: 12/12/24 2001     Extra Tube Hold for add-ons.     Comment: Auto resulted.       Urinalysis With Microscopic If Indicated (No Culture) - Urine, Clean Catch [[id number]]  (Abnormal) Collected: 12/12/24 1642    Specimen: Urine, Clean Catch  Updated: 12/12/24 1654     Color, UA Yellow     Appearance, UA Clear     pH, UA 6.0     Specific Gravity, UA 1.022     Glucose, UA Negative     Ketones, UA Trace     Bilirubin, UA Negative     Blood, UA Negative     Protein, UA Negative     Leuk Esterase, UA Negative     Nitrite, UA Negative     Urobilinogen, UA 1.0 E.U./dL    Narrative:      Urine microscopic not indicated.    Comprehensive Metabolic Panel [[id number]]  (Abnormal) Collected: 12/12/24 1550    Specimen: Blood Updated: 12/12/24 1616     Glucose 128 mg/dL      BUN 10 mg/dL      Creatinine 1.01 mg/dL      Sodium 139 mmol/L      Potassium 4.1 mmol/L      Chloride 101 mmol/L      CO2 24.7 mmol/L      Calcium 8.7 mg/dL      Total Protein 6.8 g/dL      Albumin 3.7 g/dL      ALT (SGPT) 18 U/L      AST (SGOT) 13 U/L      Alkaline Phosphatase 71 U/L      Total Bilirubin 0.4 mg/dL      Globulin 3.1 gm/dL      A/G Ratio 1.2 g/dL      BUN/Creatinine Ratio 9.9     Anion Gap 13.3 mmol/L      eGFR 100.7 mL/min/1.73     Narrative:      GFR Categories in Chronic Kidney Disease (CKD)      GFR Category          GFR (mL/min/1.73)    Interpretation  G1                     90 or greater         Normal or high (1)  G2                      60-89                Mild decrease (1)  G3a                   45-59                Mild to moderate decrease  G3b                   30-44                Moderate to severe decrease  G4                    15-29                Severe decrease  G5                    14 or less           Kidney failure          (1)In the absence of evidence of kidney disease, neither GFR category G1 or G2 fulfill the criteria for CKD.    eGFR calculation 2021 CKD-EPI creatinine equation, which does not include race as a factor    Lipase [[id number]]  (Normal) Collected: 12/12/24 1550    Specimen: Blood Updated: 12/12/24 1616     Lipase 16 U/L     Occult Blood X 1, Stool - Stool, Per Rectum [[id number]]  (Normal) Collected: 12/12/24 1607    Specimen: Stool from  Per Rectum Updated: 12/12/24 1611     Fecal Occult Blood Negative    Green Top (Gel) [[id number]] Collected: 12/12/24 1550    Specimen: Blood Updated: 12/12/24 1601     Extra Tube Hold for add-ons.     Comment: Auto resulted.       Light Blue Top [[id number]] Collected: 12/12/24 1550    Specimen: Blood Updated: 12/12/24 1601     Extra Tube Hold for add-ons.     Comment: Auto resulted       Lavender Top [[id number]] Collected: 12/12/24 1550    Specimen: Blood Updated: 12/12/24 1601     Extra Tube hold for add-on     Comment: Auto resulted       CBC & Differential [[id number]]  (Abnormal) Collected: 12/12/24 1550    Specimen: Blood Updated: 12/12/24 1558    Narrative:      The following orders were created for panel order CBC & Differential.  Procedure                               Abnormality         Status                     ---------                               -----------         ------                     CBC Auto Differential[[id number]]        Abnormal            Final result                 Please view results for these tests on the individual orders.    CBC Auto Differential [[id number]]  (Abnormal) Collected: 12/12/24 1550    Specimen: Blood Updated: 12/12/24 1558     WBC 14.86 10*3/mm3      RBC 5.58 10*6/mm3      Hemoglobin 15.3 g/dL      Hematocrit 46.3 %      MCV 83.0 fL      MCH 27.4 pg      MCHC 33.0 g/dL      RDW 13.2 %      RDW-SD 39.9 fl      MPV 11.0 fL      Platelets 224 10*3/mm3      Neutrophil % 86.8 %      Lymphocyte % 5.5 %      Monocyte % 4.4 %      Eosinophil % 2.7 %      Basophil % 0.3 %      Immature Grans % 0.3 %      Neutrophils, Absolute 12.90 10*3/mm3      Lymphocytes, Absolute 0.82 10*3/mm3      Monocytes, Absolute 0.65 10*3/mm3      Eosinophils, Absolute 0.40 10*3/mm3      Basophils, Absolute 0.04 10*3/mm3      Immature Grans, Absolute 0.05 10*3/mm3      nRBC 0.0 /100 WBC           Imaging Results (Last 24 Hours)       Procedure Component Value Units Date/Time    CT Abdomen Pelvis With  Contrast [[id number]] Collected: 12/12/24 1905     Updated: 12/12/24 1930    Addenda:        ADDENDUM REPORT    ADDENDUM:  Receipt of this report by the clinical staff was confirmed with Estelita nEglish RN on Dec 12, 2024 19:28:00 EST.    Authenticated and Electronically Signed by Dorie Stevens MD  on 12/12/2024 07:28:43 PM  Signed: 12/12/24 1928 by Dorie Stevens MD    Narrative:      FINAL REPORT    TECHNIQUE:  null    CLINICAL HISTORY:  Abdominal pain    COMPARISON:  null    FINDINGS:  CT abdomen and pelvis with contrast    Comparison: None    Findings:    No consolidation at the lung bases.    Unremarkable gallbladder and bladder. Subcentimeter low attenuating lesion in the liver which is too small to characterize. The other solid organs are unremarkable.    There are dilated loops of small bowel which measure up to 3.5 cm in the mid abdomen. The dilated loops of small bowel are thick-walled and appear to be tethered to one another into a rim enhancing fluid collection which measures 5.1 x 2.7 x 3.4 cm   (measured on series 2, image 65 and series 3, image 54). A normal appendix is identified. Colonic diverticulosis. Inferior to the abnormal loops of small bowel and rim enhancing fluid collection there is a collection of fluid and air with peripheral   enhancement measuring 3.7 x 2.3 x 3.8 cm (series 2 images 71 through 78 and series 3 images 46 through 52). This abscess is likely in continuity with the more superior abscess adjacent to the abnormal small bowel. This is adjacent to the sigmoid colon.   There is inflammation of the diverticula with pericolonic stranding. No remote free air.    Normal vasculature.    No lymphadenopathy.    Small ascites.    No acute osseous abnormality.      Impression:      Impression:    Suspect acute to subacute sigmoid diverticulitis with an adjacent abscess measuring 3.8 cm. The abscess contains air which may indicate a contained microperforation. No remote free  "air. The pericolonic abscess is likely in continuity with an abscess in   the mid abdomen which is adjacent to abnormal loops of small bowel. The mid abdominal abscess measures 5.1 cm. The small bowel is dilated and thick-walled; favor ileus with reactive wall thickening.    Authenticated and Electronically Signed by Dorie Stevens MD  on 12/12/2024 07:05:19 PM          Orders (last 24 hrs)        Start     Ordered    12/13/24 0930  tiotropium (SPIRIVA RESPIMAT) 2.5 mcg/act aerosol solution inhaler  Daily - RT,   Status:  Discontinued        Placed in \"And\" Linked Group    12/12/24 2259 12/13/24 0800  Oral Care  2 Times Daily,   Status:  Canceled       12/12/24 2259 12/13/24 0600  Incentive Spirometry  Every 4 Hours While Awake,   Status:  Canceled       12/12/24 2259 12/13/24 0600  Basic Metabolic Panel  Morning Draw,   Status:  Canceled         12/12/24 2259 12/13/24 0600  CBC Auto Differential  Morning Draw,   Status:  Canceled         12/12/24 2259 12/13/24 0000  Vital Signs  Every 4 Hours,   Status:  Canceled       12/12/24 2259 12/12/24 2345  budesonide-formoterol (SYMBICORT) 160-4.5 MCG/ACT inhaler 2 puff  2 Times Daily - RT,   Status:  Discontinued        Placed in \"And\" Linked Group    12/12/24 2259 12/12/24 2345  sodium chloride 0.9 % flush 10 mL  Every 12 Hours Scheduled,   Status:  Discontinued         12/12/24 2259 12/12/24 2345  Enoxaparin Sodium (LOVENOX) syringe 40 mg  Every 12 Hours,   Status:  Discontinued         12/12/24 2259 12/12/24 2345  sodium chloride 0.9 % infusion  Continuous,   Status:  Discontinued         12/12/24 2259 12/12/24 2300  Intake & Output  Every Shift,   Status:  Canceled       12/12/24 2259 12/12/24 2300  Weigh Patient  Once,   Status:  Canceled         12/12/24 2259 12/12/24 2300  Insert Peripheral IV  Once,   Status:  Canceled         12/12/24 2259 12/12/24 2300  Saline Lock & Maintain IV Access  Continuous,   Status:  Canceled    "      12/12/24 2259 12/12/24 2300  Place Sequential Compression Device  Once,   Status:  Canceled         12/12/24 2259 12/12/24 2300  Maintain Sequential Compression Device  Continuous,   Status:  Canceled         12/12/24 2259 12/12/24 2300  Continuous Pulse Oximetry  Continuous,   Status:  Canceled         12/12/24 2259 12/12/24 2300  Diet: Liquid; Clear Liquid; Fluid Consistency: Thin (IDDSI 0)  Diet Effective Now,   Status:  Canceled         12/12/24 2259 12/12/24 2300  Inpatient General Surgery Consult  Once,   Status:  Canceled        Specialty:  General Surgery  Provider:  Ramón Bloom MD    12/12/24 2259 12/12/24 2300  Bowel Regimen Not Indicated  Once         12/12/24 2259 12/12/24 2259  sodium chloride 0.9 % flush 10 mL  As Needed,   Status:  Discontinued         12/12/24 2259 12/12/24 2259  sodium chloride 0.9 % infusion 40 mL  As Needed,   Status:  Discontinued         12/12/24 2259 12/12/24 2259  ondansetron (ZOFRAN) injection 4 mg  Every 6 Hours PRN,   Status:  Discontinued         12/12/24 2259 12/12/24 2259  Up With Assistance  As Needed,   Status:  Canceled       12/12/24 2259 12/12/24 2259  Potassium Replacement - Follow Nurse / BPA Driven Protocol  As Needed,   Status:  Discontinued         12/12/24 2259 12/12/24 2259  Magnesium Standard Dose Replacement - Follow Nurse / BPA Driven Protocol  As Needed,   Status:  Discontinued         12/12/24 2259 12/12/24 2259  Phosphorus Replacement - Follow Nurse / BPA Driven Protocol  As Needed,   Status:  Discontinued         12/12/24 2259 12/12/24 2259  Calcium Replacement - Follow Nurse / BPA Driven Protocol  As Needed,   Status:  Discontinued         12/12/24 2259 12/12/24 2259  melatonin tablet 5 mg  Nightly PRN,   Status:  Discontinued         12/12/24 2259 12/12/24 2259  Morphine sulfate (PF) injection 2 mg  Every 4 Hours PRN,   Status:  Discontinued         12/12/24 2259 12/12/24 2054  morphine  "injection 4 mg  Once         12/12/24 2038 12/12/24 2054  ondansetron (ZOFRAN) injection 4 mg  Once         12/12/24 2038 12/12/24 2029  Inpatient Admission  Once         12/12/24 2030 12/12/24 2016  nicotine (NICODERM CQ) 21 MG/24HR patch 1 patch  Every 24 Hours Scheduled,   Status:  Discontinued         12/12/24 2000 12/12/24 1936  Blood Culture With DRE - Blood, Hand, Left  Once         12/12/24 1936 12/12/24 1936  Blood Culture With DRE - Blood, Arm, Left  Once         12/12/24 1936 12/12/24 1933  piperacillin-tazobactam (ZOSYN) IVPB 4.5 g IVPB in 100 mL NS (VTB)  Once         12/12/24 1917 12/12/24 1931  Pharmacy to Dose Zosyn  Once         12/12/24 1915 12/12/24 1916  Blood Culture - Blood,  Once,   Status:  Canceled         12/12/24 1915 12/12/24 1916  Blood Culture - Blood,  Once,   Status:  Canceled         12/12/24 1915 12/12/24 1757  iopamidol (ISOVUE-300) 61 % injection 100 mL  Once in Imaging         12/12/24 1742    12/12/24 1611  sodium chloride 0.9 % bolus 1,000 mL  Once         12/12/24 1555    12/12/24 1556  CT Abdomen Pelvis With Contrast  1 Time Imaging        Comments: IV CONTRAST ONLY      12/12/24 1555    12/12/24 1556  Insert Peripheral IV  Once,   Status:  Canceled        Placed in \"And\" Linked Group    12/12/24 1555    12/12/24 1555  sodium chloride 0.9 % flush 10 mL  As Needed,   Status:  Discontinued        Placed in \"And\" Linked Group    12/12/24 1555    12/12/24 1555  Occult Blood X 1, Stool - Stool, Per Rectum  Once         12/12/24 1554    12/12/24 1549  NPO Diet NPO Type: Strict NPO  Diet Effective Now,   Status:  Canceled         12/12/24 1548    12/12/24 1549  Undress & Gown  Once         12/12/24 1548    12/12/24 1549  Insert Peripheral IV  Once,   Status:  Canceled         12/12/24 1548    12/12/24 1549  [place] Draw  Once         12/12/24 1548    12/12/24 1549  CBC & Differential  Once         12/12/24 1548    12/12/24 1549  Comprehensive Metabolic " Panel  Once         12/12/24 1548    12/12/24 1549  Lipase  Once         12/12/24 1548    12/12/24 1549  Urinalysis With Microscopic If Indicated (No Culture) - Urine, Clean Catch  Once         12/12/24 1548    12/12/24 1549  Green Top (Gel)  PROCEDURE ONCE         12/12/24 1548    12/12/24 1549  Lavender Top  PROCEDURE ONCE         12/12/24 1548    12/12/24 1549  Gold Top - SST  PROCEDURE ONCE         12/12/24 1548    12/12/24 1549  Light Blue Top  PROCEDURE ONCE         12/12/24 1548    12/12/24 1549  CBC Auto Differential  PROCEDURE ONCE         12/12/24 1548    12/12/24 1548  sodium chloride 0.9 % flush 10 mL  As Needed,   Status:  Discontinued         12/12/24 1548                  Physician Progress Notes (most recent note)    No notes of this type exist for this encounter.       Consult Notes (most recent note)    No notes of this type exist for this encounter.

## 2024-12-13 NOTE — H&P
[facility]      Name:  Ozzie Le   Age:  34 y.o.  Sex:  male  :  1990  MRN:  [id number]   Visit Number:  [id number]  Admission Date:  2024  Date Of Service:  24  Primary Care Physician:  Sean Fernandez MD    Chief Complaint:     Abdominal pain    History Of Presenting Illness:      Patient is an obese 34-year-old male with history significant for polycythemia vera who presents to the emergency room with worsening abdominal pain.  Patient initially presented 2024 and diagnosed with diverticulitis, discharged home with Augmentin.  Patient states however that pain has continued to worsen prompting him to return.    ED summary: Patient afebrile, hemodynamically stable, nonhypoxic on room air.  CMP remarkable for glucose 128.  WBC 14.  UA unremarkable.  CT abdomen pelvisSuspect acute to subacute sigmoid diverticulitis with an adjacent abscess measuring 3.8 cm. The abscess contains air which may indicate a contained microperforation. No remote free air. The pericolonic abscess is likely in continuity with an abscess in the mid abdomen which is adjacent to abnormal loops of small bowel. The mid abdominal abscess measures 5.1 cm. The small bowel is dilated and thick-walled; favor ileus with reactive wall thickening.  General surgeon, Dr. Bloom agree to consult.  Hospitalist asked to admit.    Review Of Systems:    All systems were reviewed and negative except as mentioned in history of presenting illness, assessment and plan.    Past Medical History: Patient  has a past medical history of Abscess of sigmoid colon due to diverticulitis (10/30/2023), Depression, Diverticulitis, Hypertension, Polycythemia vera, and Primary erythrocytosis (2023).    Past Surgical History: Patient  has a past surgical history that includes Dental surgery; Tonsillectomy; and Hernia repair.    Social History: Patient  reports that he has been smoking  cigarettes. He has a 15 pack-year smoking history. He has never used smokeless tobacco. He reports that he does not currently use alcohol. He reports that he does not use drugs.    Family History:  Patient's family history has been reviewed and found to be noncontributory.     Allergies:      Patient has no known allergies.    Home Medications:    Prior to Admission Medications       Prescriptions Last Dose Informant Patient Reported? Taking?    albuterol sulfate  (90 Base) MCG/ACT inhaler   No No    Inhale 2 puffs Every 4 (Four) Hours As Needed for Wheezing or Shortness of Air.    amoxicillin-clavulanate (AUGMENTIN) 875-125 MG per tablet   No No    Take 1 tablet by mouth 2 (Two) Times a Day for 7 days.    clopidogrel (PLAVIX) 75 MG tablet   Yes No    Take 1 tablet by mouth Daily.    Fluticasone-Umeclidin-Vilant (TRELEGY ELLIPTA) 200-62.5-25 MCG/ACT inhaler   No No    Inhale 1 puff Daily. Rinse mouth with water after use.    predniSONE (DELTASONE) 20 MG tablet   No No    Take 2 tablets by mouth Daily for 5 days.          ED Medications:    Medications   sodium chloride 0.9 % flush 10 mL (has no administration in time range)   sodium chloride 0.9 % flush 10 mL (has no administration in time range)   nicotine (NICODERM CQ) 21 MG/24HR patch 1 patch (1 patch Transdermal Medication Applied 12/12/24 2020)   sodium chloride 0.9 % bolus 1,000 mL (0 mL Intravenous Stopped 12/12/24 1832)   iopamidol (ISOVUE-300) 61 % injection 100 mL (100 mL Intravenous Given 12/12/24 1749)   Pharmacy to Dose Zosyn ( Not Applicable Given 12/12/24 1934)   piperacillin-tazobactam (ZOSYN) IVPB 4.5 g IVPB in 100 mL NS (VTB) (0 g Intravenous Stopped 12/12/24 2021)     Vital Signs:  Temp:  [97.6 °F (36.4 °C)] 97.6 °F (36.4 °C)  Heart Rate:  [69-83] 69  Resp:  [18] 18  BP: (117-132)/(68-82) 117/73        12/12/24  1534   Weight: (!) 145 kg (320 lb)     Body mass index is 40 kg/m².    Physical Exam:     Most recent vital Signs: /73    "Pulse 69   Temp 97.6 °F (36.4 °C) (Oral)   Resp 18   Ht 190.5 cm (75\")   Wt (!) 145 kg (320 lb)   SpO2 95%   BMI 40.00 kg/m²     Physical Exam  Vitals reviewed.   Constitutional:       General: He is not in acute distress.     Appearance: He is obese.   HENT:      Head: Normocephalic and atraumatic.      Right Ear: External ear normal.      Left Ear: External ear normal.      Mouth/Throat:      Mouth: Mucous membranes are moist.      Pharynx: Oropharynx is clear.   Eyes:      Extraocular Movements: Extraocular movements intact.      Conjunctiva/sclera: Conjunctivae normal.   Cardiovascular:      Rate and Rhythm: Normal rate and regular rhythm.      Pulses: Normal pulses.      Heart sounds: Normal heart sounds.   Pulmonary:      Effort: Pulmonary effort is normal.      Breath sounds: Normal breath sounds.   Abdominal:      General: Bowel sounds are normal.      Palpations: Abdomen is soft.      Tenderness: There is abdominal tenderness.   Musculoskeletal:         General: Normal range of motion.   Skin:     General: Skin is warm and dry.   Neurological:      General: No focal deficit present.      Mental Status: He is alert and oriented to person, place, and time.         Laboratory data:    I have reviewed the labs done in the emergency room.    Results from last 7 days   Lab Units 12/12/24  1550 12/09/24  1130   SODIUM mmol/L 139 137   POTASSIUM mmol/L 4.1 4.2   CHLORIDE mmol/L 101 102   CO2 mmol/L 24.7 24.6   BUN mg/dL 10 12   CREATININE mg/dL 1.01 1.14   CALCIUM mg/dL 8.7 9.0   BILIRUBIN mg/dL 0.4 0.8   ALK PHOS U/L 71 76   ALT (SGPT) U/L 18 20   AST (SGOT) U/L 13 14   GLUCOSE mg/dL 128* 133*     Results from last 7 days   Lab Units 12/12/24  1550 12/09/24  1130   WBC 10*3/mm3 14.86* 19.08*   HEMOGLOBIN g/dL 15.3 15.6   HEMATOCRIT % 46.3 47.1   PLATELETS 10*3/mm3 224 167                     Results from last 7 days   Lab Units 12/12/24  1550   LIPASE U/L 16         Results from last 7 days   Lab Units " 12/12/24  1642   COLOR UA  Yellow   GLUCOSE UA  Negative   KETONES UA  Trace*   BLOOD UA  Negative   LEUKOCYTES UA  Negative   PH, URINE  6.0   BILIRUBIN UA  Negative   UROBILINOGEN UA  1.0 E.U./dL       Pain Management Panel           No data to display                EKG:          Radiology:    CT Abdomen Pelvis With Contrast    Addendum Date: 12/12/2024    ADDENDUM REPORT ADDENDUM: Receipt of this report by the clinical staff was confirmed with Estelita English RN on Dec 12, 2024 19:28:00 EST. Authenticated and Electronically Signed by Dorie Stevens MD on 12/12/2024 07:28:43 PM    Result Date: 12/12/2024  FINAL REPORT TECHNIQUE: null CLINICAL HISTORY: Abdominal pain COMPARISON: null FINDINGS: CT abdomen and pelvis with contrast Comparison: None Findings: No consolidation at the lung bases. Unremarkable gallbladder and bladder. Subcentimeter low attenuating lesion in the liver which is too small to characterize. The other solid organs are unremarkable. There are dilated loops of small bowel which measure up to 3.5 cm in the mid abdomen. The dilated loops of small bowel are thick-walled and appear to be tethered to one another into a rim enhancing fluid collection which measures 5.1 x 2.7 x 3.4 cm (measured on series 2, image 65 and series 3, image 54). A normal appendix is identified. Colonic diverticulosis. Inferior to the abnormal loops of small bowel and rim enhancing fluid collection there is a collection of fluid and air with peripheral enhancement measuring 3.7 x 2.3 x 3.8 cm (series 2 images 71 through 78 and series 3 images 46 through 52). This abscess is likely in continuity with the more superior abscess adjacent to the abnormal small bowel. This is adjacent to the sigmoid colon. There is inflammation of the diverticula with pericolonic stranding. No remote free air. Normal vasculature. No lymphadenopathy. Small ascites. No acute osseous abnormality.     Impression: Suspect acute to subacute sigmoid  diverticulitis with an adjacent abscess measuring 3.8 cm. The abscess contains air which may indicate a contained microperforation. No remote free air. The pericolonic abscess is likely in continuity with an abscess in the mid abdomen which is adjacent to abnormal loops of small bowel. The mid abdominal abscess measures 5.1 cm. The small bowel is dilated and thick-walled; favor ileus with reactive wall thickening. Authenticated and Electronically Signed by Dorie Stevens MD on 12/12/2024 07:05:19 PM    CT Abdomen Pelvis With Contrast    Result Date: 12/9/2024  PROCEDURE: CT ABDOMEN PELVIS W CONTRAST-  HISTORY:  Diffuse abdominal pain, history of diverticulitis, eval diverticulitis  COMPARISON: November 9, 2024.  TECHNIQUE: Multiple axial CT images were obtained from the lung bases through the pubic symphysis following the administration of Isovue 300 contrast.  FINDINGS:  ABDOMEN: The liver shows fatty infiltration. The gallbladder is present. There is no CT evidence of gallstones. The spleen is unremarkable. No adrenal mass is present.  The pancreas is normal. The kidneys are normal. The aorta is normal in caliber.  PELVIS: The appendix is retrocecal and appears unremarkable. There are multiple diverticuli. There is wall thickening and significant infiltration of surrounding fat of the mid sigmoid colon. A loop of ileum seen located superior and to the right demonstrates significant wall thickening and significant infiltration of the surrounding fat, likely reactive. Air-fluid levels are seen in nondistended small bowel loops likely due to ileus from inflammatory process. A few bubbles of extraluminal air are seen between the sigmoid colon and ileum on image #49 of series 3 and image #82 of series 2. No rim-enhancing fluid collection is seen to suggest abscess. There is a minimal amount of free fluid in the pelvis. Prostate is normal in size. The urinary bladder is unremarkable.         1. Mid sigmoid  diverticulitis with small bubbles of extraluminal air. No juvenal abscess formation. 2. Inflammatory changes seen in an adjacent loop of ileum. 3. Bowel gas pattern suggestive of ileus.   CTDI: 17.05 mGy DLP: 1444.41 mGy.cm   This study was performed with techniques to keep radiation doses as low as reasonably achievable (ALARA). Individualized dose reduction techniques using automated exposure control or adjustment of mA and/or kV according to the patient size were employed.      Images were reviewed, interpreted, and dictated by Dr. Juanita Agosto MD Transcribed by Torri Chiang PA-C.  This report was signed and finalized on 12/9/2024 2:16 PM by Juanita Agosto MD.      CT Angiogram Chest Pulmonary Embolism    Result Date: 12/9/2024  PROCEDURE: CT ANGIOGRAM CHEST PULMONARY EMBOLISM-  HISTORY: Shortness of breath, tachycardic, eval PE, bacterial pneumonia  COMPARISON: None .  TECHNIQUE: Multiple axial CT images were obtained from the thoracic inlet through the upper abdomen following the administration of Isovue 300 per the CT PE protocol. Coronal and oblique 3D MIP images were reconstructed from the original axial data set.  FINDINGS: There are no filling defects within the pulmonary arteries to suggest an embolus. The thoracic aorta is normal in caliber with no evidence of dissection. The heart is normal in size. There are no pleural or pericardial effusions. There is no adenopathy.  Lung windows reveal no focal opacities or suspicious pulmonary nodules.      No evidence of pulmonary embolus or dissection.     DLP: 1444.41 mGy.cm CTDI: 17.05 mGy   This study was performed with techniques to keep radiation doses as low as reasonably achievable (ALARA). Individualized dose reduction techniques using automated exposure control or adjustment of mA and/or kV according to the patient size were employed.     Images were reviewed, interpreted, and dictated by Dr. Juanita Agosto MD Transcribed by Torri Chiang PA-C.  This  report was signed and finalized on 12/9/2024 2:03 PM by Juanita Agosto MD.      XR Chest 1 View    Result Date: 12/9/2024  PROCEDURE: XR CHEST 1 VW-    HISTORY: Shortness of breath  COMPARISON: July 2021.  FINDINGS: Apical lordotic view. The heart is normal in size. The mediastinum is unremarkable. The lungs are clear. There is no pneumothorax. There are no acute osseous abnormalities.      No acute cardiopulmonary process.        Images were reviewed, interpreted, and dictated by Dr. Juanita Agosto MD Transcribed by Torri Chiang PA-C.  This report was signed and finalized on 12/9/2024 12:35 PM by Juanita Agosto MD.       Assessment:    Acute diverticulitis with abscesses  Polycythemia vera  Obesity    Plan:    Acute diverticulitis with abscesses  Dr. Bloom, general surgery consulted  Zosyn  Antiemetics and pain control as needed  Polycythemia vera  Holding Plavix  Patient scheduled to undergo therapeutic phlebotomy 12/13.  May need to reschedule as I am not certain that we can do this while inpatient.    Risk Assessment: High  DVT Prophylaxis: Lovenox  Code Status: Full  Diet: Claudia Herrera DO  12/12/24  20:31 EST    Dictated utilizing Dragon dictation.

## 2024-12-13 NOTE — OUTREACH NOTE
Prep Survey      Flowsheet Row Responses   [facility] patient discharged from? [place]   Is LACE score < 7 ? Yes   Eligibility [facility]   Date of Admission 12/12/24   Date of Discharge 12/13/24   Discharge diagnosis Diverticulitis   Does the patient have one of the following disease processes/diagnoses(primary or secondary)? Other   Prep survey completed? Yes            Tamra DIXON - Registered Nurse

## 2024-12-13 NOTE — PROGRESS NOTES
Pharmacokinetic Consult - Zosyn Dosing  Ozzie Le is a 34 y.o. male who has been consulted to dose Zosyn for  Intra-Abdominal Infection  for one dose.    Current Antimicrobial Therapy    Anti-Infectives (From admission, onward)      Ordered     Dose/Rate Route Frequency Start Stop    12/12/24 1915  Pharmacy to Dose Zosyn        Ordering Provider: Melecio Dolan PA-C     Not Applicable Once 12/12/24 1931              Microbiology Results (last 10 days)       Procedure Component Value - Date/Time    COVID-19 and FLU A/B PCR, 1 HR TAT - Swab, Nasopharynx [[id number]]  (Normal) Collected: 12/09/24 1130    Lab Status: Final result Specimen: Swab from Nasopharynx Updated: 12/09/24 1215     COVID19 Not Detected     Influenza A PCR Not Detected     Influenza B PCR Not Detected    Narrative:      Fact sheet for providers: https://www.fda.gov/media/815601/download    Fact sheet for patients: https://www.fda.gov/media/490847/download    Test performed by PCR.             Allergies  Patient has no known allergies.    Relevant clinical data and objective history reviewed:  Creatinine   Date Value Ref Range Status   12/12/2024 1.01 0.76 - 1.27 mg/dL Final     Estimated Creatinine Clearance: 160.4 mL/min (by C-G formula based on SCr of 1.01 mg/dL).  No intake/output data recorded.  Patient weight: (!) 145 kg (320 lb)    Asessment/Plan  Initiate Zosyn 4.5g IV x 1 dose  Pharmacy will monitor Mr. Le's renal function and clinical status and adjust the Zosyn dose and/or frequency as needed.    Thanks,   Estelle Ortega, PharmD  12/12/2024  19:16 EST

## 2024-12-13 NOTE — DISCHARGE SUMMARY
"    [facility]    Name:  Ozzie Le   Age:  34 y.o.  Sex:  male  :  1990  MRN:  [id number]   Visit Number:  [id number]  Admission Date:  2024  Date Of Service:  24  Primary Care Physician:  Sean Fernandez MD    Patient was seen and examined. Pertinent laboratory and radiology data were reviewed.    Vital signs:    Vital Signs (last 24 hours)          0700   0659  0700   1818   Most Recent      Temp (°F) 97.6 -  97.9       97.9 (36.6) 2145    Heart Rate 64 -  83       64 2145    Resp 16 -  18       16 2145    /73 -  132/82       129/76 2145    SpO2 (%) 93 -  96       94 2145            Nursing staff informed me that patient no longer wanted to stay in the hospital and was requesting to leave AGAINST MEDICAL ADVICE.  I discussed with patient and his wife at bedside.  Patient told me that he \"had a bad experience at our hospital\".  I explained to him the importance of remaining to receive treatment.  Patient and wife told me \"I do not want to talk to you anymore about this. Hand me my papers\".  Patient was informed of potential consequences including worsening illness and death.  Patient signed out AMA.    Hair Herrear DO  24  18:18 EST    Dictated utilizing Dragon dictation.    "

## 2024-12-15 NOTE — PAYOR COMM NOTE
"To:  [facility]  From: Edda Jaime RN  Phone: [phone]  Fax: [phone]  NPI: [id number]  TIN: [id number]  Member ID: [id number]   MRN: [id number]    Mario Alberto Ferrera (34 y.o. Male)       Date of Birth   1990    Social Security Number       Address   [address] [place], [place] [address]    Home Phone   [phone]    MRN   [id number]       Jew   None    Marital Status   Legally                             Admission Date   24    Admission Type   Emergency    Admitting Provider   Hair Herrera DO    Attending Provider       Department, Room/Bed   [facility] TELEMETRY SDS OVERFLOW, T05       Discharge Date   2024    Discharge Disposition   Home or Self Care    Discharge Destination   Home                              Attending Provider: (none)   Allergies: No Known Allergies    Isolation: None   Infection: None   Code Status: Prior    Ht: 190.5 cm (75\")   Wt: 145 kg (320 lb)    Admission Cmt: None   Principal Problem: Diverticulitis [K57.92]                   Active Insurance as of 2024       Primary Coverage       Payor Plan Insurance Group Employer/Plan Group    [facility] COMMUNITY PLAN [facility] COMMUNITY PLAN Levine, Susan. \Hospital Has a New Name and Outlook.\""       Payor Plan Address Payor Plan Phone Number Payor Plan Fax Number Effective Dates    [address]   2021 - None Entered    [facility] [address]         Subscriber Name Subscriber Birth Date Member ID       MARIO ALBERTO FERRERA 1990 [id number]                     Emergency Contacts        (Rel.) Home Phone Work Phone Mobile Phone    Damaris Ferrera (Spouse) [phone] -- [phone]                 Discharge Summary        Hair Herrera DO at [address] [place] [address] HOSPITALIST   FOLLOW UP NOTE    Name:  Mario Alberto Ferrera   Age:  34 y.o.  Sex:  male  :  1990  MRN:  [id number]   Visit Number:  [id number]  Admission Date:  2024  Date Of Service:  " "12/13/24  Primary Care Physician:  Sean Fernandez MD    Patient was seen and examined. Pertinent laboratory and radiology data were reviewed.    Vital signs:    Vital Signs (last 24 hours)         12/12 0700  12/13 0659 12/13 0700  12/13 1818   Most Recent      Temp (°F) 97.6 -  97.9       97.9 (36.6) 12/12 2145    Heart Rate 64 -  83       64 12/12 2145    Resp 16 -  18       16 12/12 2145    /73 -  132/82       129/76 12/12 2145    SpO2 (%) 93 -  96       94 12/12 2145            Nursing staff informed me that patient no longer wanted to stay in the hospital and was requesting to leave AGAINST MEDICAL ADVICE.  I discussed with patient and his wife at bedside.  Patient told me that he \"had a bad experience at our hospital\".  I explained to him the importance of remaining to receive treatment.  Patient and wife told me \"I do not want to talk to you anymore about this. Hand me my papers\".  Patient was informed of potential consequences including worsening illness and death.  Patient signed out BRISEYDA.    Hair Herrera DO  12/13/24  18:18 EST    Dictated utilizing Dragon dictation.      Electronically signed by Hair Herrera DO at 12/13/24 1819       "

## 2024-12-16 ENCOUNTER — TRANSITIONAL CARE MANAGEMENT TELEPHONE ENCOUNTER (OUTPATIENT)
Dept: CALL CENTER | Facility: HOSPITAL | Age: 34
End: 2024-12-16
Payer: MEDICAID

## 2024-12-16 NOTE — OUTREACH NOTE
Call Center TCM Note      Flowsheet Row Responses   [facility] patient discharged from? [place]   Does the patient have one of the following disease processes/diagnoses(primary or secondary)? Other   TCM attempt successful? No  [No call,  patient left AM]   Unsuccessful attempts Attempt 1   Revoked Reason Other  [Patient left [facility] AM. Patient not eligible for TCM.]            Estelle Son RN    12/16/2024, 10:19 EST

## 2024-12-17 LAB
BACTERIA SPEC AEROBE CULT: NORMAL
BACTERIA SPEC AEROBE CULT: NORMAL

## 2025-01-03 ENCOUNTER — READMISSION MANAGEMENT (OUTPATIENT)
Dept: CALL CENTER | Facility: HOSPITAL | Age: 35
End: 2025-01-03
Payer: MEDICAID

## 2025-01-04 NOTE — OUTREACH NOTE
Prep Survey      Flowsheet Row Responses   [facility]   Is LACE score < 7 ? Non-BH Discharge   Eligibility [facility]   Date of Admission 12/30/24   Date of Discharge 01/03/25   Discharge Disposition Home or Self Care   Discharge diagnosis Sepsis   Does the patient have one of the following disease processes/diagnoses(primary or secondary)? Sepsis   Does the patient have Home health ordered? No   Prep survey completed? Yes            YOMI ALTAMIRANO - Registered Nurse

## 2025-01-06 ENCOUNTER — TRANSITIONAL CARE MANAGEMENT TELEPHONE ENCOUNTER (OUTPATIENT)
Dept: CALL CENTER | Facility: HOSPITAL | Age: 35
End: 2025-01-06
Payer: MEDICAID

## 2025-01-06 NOTE — OUTREACH NOTE
Call Center TCM Note      Flowsheet Row Responses   [facility] patient discharged from? Non-BH  [[facility]]   Does the patient have one of the following disease processes/diagnoses(primary or secondary)? Other   TCM attempt successful? Yes  [VR- Spouse]   Call start time 1300   Call end time 1301   Discharge diagnosis Sepsis   Meds reviewed with patient/caregiver? Yes   Is the patient having any side effects they believe may be caused by any medication additions or changes? No   Does the patient have all medications ordered at discharge? Yes   Is the patient taking all medications as directed (includes completed medication regime)? Yes   Comments Declines HOSP DC FU appt at this time and states he will call office for appt.   Does the patient have an appointment with their PCP within 7-14 days of discharge? No   Nursing Interventions Routed TCM call to PCP office, Patient declined scheduling/rescheduling appointment at this time   Has home health visited the patient within 72 hours of discharge? N/A   Psychosocial issues? No   Did the patient receive a copy of their discharge instructions? Yes   Nursing interventions Reviewed instructions with patient   What is the patient's perception of their health status since discharge? Improving   Is the patient/caregiver able to teach back the hierarchy of who to call/visit for symptoms/problems? PCP, Specialist, Home health nurse, Urgent Care, ED, 911 Yes   TCM call completed? Yes   Wrap up additional comments pt reports improvement. Declines HOSP DC FU appt at this time and states he will call office for appt.   Call end time 1301            Jennifer Britton RN    1/6/2025, 13:02 EST

## 2025-01-22 ENCOUNTER — HOSPITAL ENCOUNTER (OUTPATIENT)
Dept: INFUSION THERAPY | Facility: HOSPITAL | Age: 35
Discharge: HOME | End: 2025-01-22
Admitting: INTERNAL MEDICINE
Payer: MEDICAID

## 2025-01-22 ENCOUNTER — TRANSCRIBE ORDERS (OUTPATIENT)
Dept: ADMINISTRATIVE | Facility: HOSPITAL | Age: 35
End: 2025-01-22
Payer: MEDICAID

## 2025-01-22 VITALS
WEIGHT: 310 LBS | DIASTOLIC BLOOD PRESSURE: 71 MMHG | HEIGHT: 75 IN | SYSTOLIC BLOOD PRESSURE: 115 MMHG | RESPIRATION RATE: 22 BRPM | BODY MASS INDEX: 38.54 KG/M2 | HEART RATE: 70 BPM

## 2025-01-22 DIAGNOSIS — K57.20 DIVERTICULITIS OF LARGE INTESTINE WITH ABSCESS WITHOUT BLEEDING: Primary | ICD-10-CM

## 2025-01-22 PROCEDURE — C1751 CATH, INF, PER/CENT/MIDLINE: HCPCS

## 2025-01-22 PROCEDURE — C1894 INTRO/SHEATH, NON-LASER: HCPCS

## 2025-01-22 RX ORDER — SODIUM CHLORIDE 0.9 % (FLUSH) 0.9 %
10 SYRINGE (ML) INJECTION EVERY 12 HOURS SCHEDULED
Status: DISCONTINUED | OUTPATIENT
Start: 2025-01-22 | End: 2025-01-24 | Stop reason: HOSPADM

## 2025-01-22 RX ORDER — SODIUM CHLORIDE 0.9 % (FLUSH) 0.9 %
10 SYRINGE (ML) INJECTION AS NEEDED
Status: DISCONTINUED | OUTPATIENT
Start: 2025-01-22 | End: 2025-01-24 | Stop reason: HOSPADM

## 2025-01-22 RX ORDER — SODIUM CHLORIDE 9 MG/ML
40 INJECTION, SOLUTION INTRAVENOUS AS NEEDED
Status: DISCONTINUED | OUTPATIENT
Start: 2025-01-22 | End: 2025-01-24 | Stop reason: HOSPADM

## 2025-01-22 RX ORDER — SODIUM CHLORIDE 0.9 % (FLUSH) 0.9 %
20 SYRINGE (ML) INJECTION AS NEEDED
Status: DISCONTINUED | OUTPATIENT
Start: 2025-01-22 | End: 2025-01-24 | Stop reason: HOSPADM

## 2025-01-22 NOTE — NURSING NOTE
Single lumen right bicep PICC in place. No hematoma noted. Patient denies discomfort. All belongings with patient and wife. Ambulatory patient.

## 2025-03-19 ENCOUNTER — OFFICE VISIT (OUTPATIENT)
Dept: FAMILY MEDICINE CLINIC | Facility: CLINIC | Age: 35
End: 2025-03-19
Payer: MEDICAID

## 2025-03-19 VITALS
SYSTOLIC BLOOD PRESSURE: 118 MMHG | BODY MASS INDEX: 38.64 KG/M2 | TEMPERATURE: 98.4 F | DIASTOLIC BLOOD PRESSURE: 64 MMHG | HEART RATE: 88 BPM | HEIGHT: 75 IN | WEIGHT: 310.8 LBS | OXYGEN SATURATION: 95 %

## 2025-03-19 DIAGNOSIS — M54.42 ACUTE MIDLINE LOW BACK PAIN WITH LEFT-SIDED SCIATICA: Primary | ICD-10-CM

## 2025-03-19 DIAGNOSIS — E66.812 CLASS 2 SEVERE OBESITY DUE TO EXCESS CALORIES WITH SERIOUS COMORBIDITY AND BODY MASS INDEX (BMI) OF 38.0 TO 38.9 IN ADULT: ICD-10-CM

## 2025-03-19 DIAGNOSIS — M51.369 BULGING LUMBAR DISC: ICD-10-CM

## 2025-03-19 DIAGNOSIS — M79.18 LUMBAR MUSCLE PAIN: ICD-10-CM

## 2025-03-19 DIAGNOSIS — E66.01 CLASS 2 SEVERE OBESITY DUE TO EXCESS CALORIES WITH SERIOUS COMORBIDITY AND BODY MASS INDEX (BMI) OF 38.0 TO 38.9 IN ADULT: ICD-10-CM

## 2025-03-19 DIAGNOSIS — Z23 NEED FOR VACCINATION: ICD-10-CM

## 2025-03-19 PROCEDURE — 3078F DIAST BP <80 MM HG: CPT | Performed by: FAMILY MEDICINE

## 2025-03-19 PROCEDURE — 1125F AMNT PAIN NOTED PAIN PRSNT: CPT | Performed by: FAMILY MEDICINE

## 2025-03-19 PROCEDURE — 99214 OFFICE O/P EST MOD 30 MIN: CPT | Performed by: FAMILY MEDICINE

## 2025-03-19 PROCEDURE — 1159F MED LIST DOCD IN RCRD: CPT | Performed by: FAMILY MEDICINE

## 2025-03-19 PROCEDURE — 3074F SYST BP LT 130 MM HG: CPT | Performed by: FAMILY MEDICINE

## 2025-03-19 PROCEDURE — 1160F RVW MEDS BY RX/DR IN RCRD: CPT | Performed by: FAMILY MEDICINE

## 2025-03-19 RX ORDER — KETOROLAC TROMETHAMINE 30 MG/ML
60 INJECTION, SOLUTION INTRAMUSCULAR; INTRAVENOUS ONCE
Status: SHIPPED | OUTPATIENT
Start: 2025-03-19 | End: 2025-03-24

## 2025-03-19 RX ORDER — METHOCARBAMOL 750 MG/1
1 TABLET, FILM COATED ORAL 3 TIMES DAILY
COMMUNITY
Start: 2025-03-17 | End: 2025-03-19

## 2025-03-19 RX ORDER — PREDNISONE 20 MG/1
2 TABLET ORAL DAILY
COMMUNITY
Start: 2025-03-17

## 2025-03-19 RX ORDER — IBUPROFEN 800 MG/1
800 TABLET, FILM COATED ORAL EVERY 6 HOURS PRN
Qty: 60 TABLET | Refills: 0 | Status: SHIPPED | OUTPATIENT
Start: 2025-03-19

## 2025-03-19 RX ORDER — LIDOCAINE 50 MG/G
PATCH TOPICAL
COMMUNITY
Start: 2025-03-17

## 2025-03-19 RX ORDER — CYCLOBENZAPRINE HCL 10 MG
10 TABLET ORAL 3 TIMES DAILY PRN
Qty: 30 TABLET | Refills: 0 | Status: SHIPPED | OUTPATIENT
Start: 2025-03-19

## 2025-03-19 NOTE — PROGRESS NOTES
"    Office Note     Name: Ozzie Le II  : 1990   MRN: [id number]     Chief Complaint:  Follow-up ([facility] ER F/U, went on 25. His back was hurting over a couple day period and the pain got so bad that he went to the ER. They done a CT and found 3 bulging disc's. /Wants tetanus shot )    Subjective     History of Present Illness:  Ozzie Le II is a 35 y.o. male who presents today for low back pain.  Was seen at the ER 2 days ago (Psychiatric).  At that time had CT lumbar done which found 3 bulging discs.  No falls or trauma. No known injury mechanism.  Patient has been off work recovering from his surgery that occurred on 2025.    I have reviewed the following portions of the patient's history and these were updated and discussed with the patient as appropriate: past family history, past medical history, past social history, past surgical history, and problem list.     Objective     Vital Signs  /64 (BP Location: Right arm, Patient Position: Sitting, Cuff Size: Adult)   Pulse 88   Temp 98.4 °F (36.9 °C) (Oral)   Ht 190.5 cm (75\")   Wt (!) 141 kg (310 lb 12.8 oz)   SpO2 95%   BMI 38.85 kg/m²   Estimated body mass index is 38.85 kg/m² as calculated from the following:    Height as of this encounter: 190.5 cm (75\").    Weight as of this encounter: 141 kg (310 lb 12.8 oz).    Physical Exam  Vitals reviewed.   Constitutional:       General: He is not in acute distress.     Appearance: Normal appearance. He is obese. He is not ill-appearing.   HENT:      Head: Normocephalic.   Eyes:      Extraocular Movements: Extraocular movements intact.   Cardiovascular:      Rate and Rhythm: Normal rate.   Pulmonary:      Effort: Pulmonary effort is normal.      Breath sounds: Normal breath sounds.   Musculoskeletal:      Lumbar back: Spasms and tenderness present. No swelling, edema, deformity, signs of trauma, lacerations or bony tenderness. Decreased range of " motion. Positive left straight leg raise test. Negative right straight leg raise test. No scoliosis.   Neurological:      Mental Status: He is alert and oriented to person, place, and time.   Psychiatric:         Mood and Affect: Mood normal.         Behavior: Behavior normal.               Assessment and Plan     Diagnoses and all orders for this visit:    1. Acute midline low back pain with left-sided sciatica (Primary)  -     ketorolac (TORADOL) injection 60 mg  -     cyclobenzaprine (FLEXERIL) 10 MG tablet; Take 1 tablet by mouth 3 (Three) Times a Day As Needed for Muscle Spasms.  Dispense: 30 tablet; Refill: 0  -     ibuprofen (ADVIL,MOTRIN) 800 MG tablet; Take 1 tablet by mouth Every 6 (Six) Hours As Needed for Mild Pain.  Dispense: 60 tablet; Refill: 0  -     Ambulatory Referral to Physical Therapy for Evaluation & Treatment    2. Lumbar muscle pain    3. Bulging lumbar disc    4. Class 2 severe obesity due to excess calories with serious comorbidity and body mass index (BMI) of 38.0 to 38.9 in adult    Unfortunately unable to view ER note or imaging  Will start conservative treatment for patient's acute lumbar pain likely due to muscle strain and bulging disc.  IM Toradol today  Robaxin not helping, lidocaine patch is not helping.  Will start Flexeril.  Advised patient regarding sedation effects  Will start ibuprofen 800 mg every 6 hours as needed.  Patient advised to start tomorrow  Increase water intake  Start formal physical therapy.  Referral placed  PT exercises on AVS to do at home  Patient requested tetanus vaccine, however, insurance would not cover it so not given today.      Discussion Summary:     Discussed plan of care in detail with patient today. Patient was encouraged to keep me informed of any acute changes, lack of improvement, or any new concerning symptoms.  Patient is also aware of reasons to seek emergent care. Patient verbalized understanding and agrees with plan of care.    This visit  was billed based on time.  I spent 30 minutes caring for Ozzie Le II on this date of service. This time includes time spent by me in the following activities:preparing for the visit, reviewing tests, obtaining and/or reviewing a separately obtained history, performing a medically appropriate examination and/or evaluation , counseling and educating the patient/family/caregiver, ordering medications, tests, or procedures, referring and communicating with other health care professionals , documenting information in the medical record, independently interpreting results and communicating that information with the patient/family/caregiver, and care coordination.    Follow Up  Return in about 2 months (around 5/19/2025) for Annual physical.    Please note that portions of this note may have been completed with a voice recognition program.     Sean Fernandez MD, MPH  [facility] [place]

## 2025-03-24 ENCOUNTER — OFFICE VISIT (OUTPATIENT)
Dept: BEHAVIORAL HEALTH | Facility: CLINIC | Age: 35
End: 2025-03-24
Payer: MEDICAID

## 2025-03-24 DIAGNOSIS — F43.9 TRAUMA AND STRESSOR-RELATED DISORDER: ICD-10-CM

## 2025-03-24 DIAGNOSIS — F43.23 ADJUSTMENT DISORDER WITH MIXED ANXIETY AND DEPRESSED MOOD: Primary | ICD-10-CM

## 2025-03-24 PROCEDURE — 90834 PSYTX W PT 45 MINUTES: CPT | Performed by: COUNSELOR

## 2025-03-24 NOTE — TREATMENT PLAN
Multi-Disciplinary Problems (from Behavioral Health Treatment Plan)      Active Problems       Problem: Adjustment  Start Date: 03/24/25      Problem Details: The patient self-scales this problem as a 6 with 10 being the worst.          Goal Priority Start Date Expected End Date End Date    Patient will implement healthy coping strategies. Low 03/24/25 09/22/25 --    Goal Details: Progress toward goal:  The patient self-scales their progress related to this goal as a 5 with 10 being the worst.        Goal Intervention Frequency Start Date End Date    Assist patient to identify and develop healthy coping strategies PRN 03/24/25 --    Intervention Details: Duration of treatment until discharged.                Problem: Anger  Start Date: 03/24/25      Problem Details: The patient self-scales this problem as a 4 with 10 being the worst.          Goal Priority Start Date Expected End Date End Date    Patient will develop specific, socially acceptable way to manage anger. Low 03/24/25 09/22/25 --    Goal Details: Progress toward goal:  The patient self-scales their progress related to this goal as a 4 with 10 being the worst.        Goal Intervention Frequency Start Date End Date    Process patient's angry feelings or outbursts that have recently occurred and review alternative behaviors PRN 03/24/25 --    Intervention Details: Duration of treatment until discharged.        Goal Intervention Frequency Start Date End Date    Work with patient to develop constructive way to handle anger. PRN 03/24/25 --    Intervention Details: Duration of treatment until discharged.                Problem: Medical Issue  Start Date: 03/24/25      Problem Details: The patient self-scales this problem as a 8 with 10 being the worst.          Goal Priority Start Date Expected End Date End Date    Patient will verbalize emotional, cognitive and behavioral changes needed to address health issues. Medium 03/24/25 09/22/25 --    Goal Details:  Progress toward goal:  The patient self-scales their progress related to this goal as a 7 with 10 being the worst.        Goal Intervention Frequency Start Date End Date    Reinforce emotional stability, behavioral responsibility and positive self talk that reduces risk to health. PRN 03/24/25 --    Intervention Details: Duration of treatment until discharged.                        Reviewed By       Maida Heart, [facility] 03/24/25 9111                     I have discussed and reviewed this treatment plan with the patient.
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
## 2025-05-27 ENCOUNTER — OFFICE VISIT (OUTPATIENT)
Dept: BEHAVIORAL HEALTH | Facility: CLINIC | Age: 35
End: 2025-05-27
Payer: MEDICAID

## 2025-05-27 DIAGNOSIS — F43.9 TRAUMA AND STRESSOR-RELATED DISORDER: ICD-10-CM

## 2025-05-27 DIAGNOSIS — F43.23 ADJUSTMENT DISORDER WITH MIXED ANXIETY AND DEPRESSED MOOD: Primary | ICD-10-CM

## 2025-05-27 PROCEDURE — 90832 PSYTX W PT 30 MINUTES: CPT | Performed by: COUNSELOR

## 2025-05-27 NOTE — TREATMENT PLAN
Multi-Disciplinary Problems (from Behavioral Health Treatment Plan)      Active Problems       Problem: Ineffective Communication  Start Date: 03/24/25      Problem Details: The patient self-scales this problem as a 6 with 10 being the worst.          Goal Priority Start Date Expected End Date End Date    Patient will demonstrate the ability to initiate new communication patterns outside of sessions on a consistent basis. Medium 03/24/25 11/25/25 --    Goal Details: Progress toward goal:  The patient self-scales their progress related to this goal as a 5 with 10 being the worst.        Goal Intervention Frequency Start Date End Date    Encourage understanding of past experiences that affect current communication style and educate about healthy communication patterns. PRN 03/24/25 --    Intervention Details: Duration of treatment until discharged.                Problem: Trauma and Stressor Related Disorders  Start Date: 03/24/25      Problem Details: The patient self-scales this problem as a 6 with 10 being the worst.          Goal Priority Start Date Expected End Date End Date    Patient will process and move through trauma in a way that improves self regard and the patients ability to function optimally in the world around them. Medium 03/24/25 11/25/25 --    Goal Details: Progress toward goal:  The patient self-scales their progress related to this goal as a 6 with 10 being the worst.        Goal Intervention Frequency Start Date End Date    Assist patient in identifying ways that trauma has negatively impacted their view of themselves and the world. PRN 03/24/25 --    Intervention Details: Duration of treatment until discharged.        Goal Intervention Frequency Start Date End Date    Process trauma in the context of the safe session environment. PRN 03/24/25 --    Intervention Details: Duration of treatment until discharged.        Goal Intervention Frequency Start Date End Date    Develop a plan of behavior  changes that will reduce the stress of the trauma. PRN 03/24/25 --    Intervention Details: Duration of treatment until discharged.                Problem: Relationship Issues  Start Date: 03/24/25      Problem Details: The patient self-scales this problem as a 8 with 10 being the worst.          Goal Priority Start Date Expected End Date End Date    Patient will initiate personal change to improve relationships. High 03/24/25 11/25/25 --    Goal Details: Progress toward goal:  The patient self-scales their progress related to this goal as a 8 with 10 being the worst.        Goal Intervention Frequency Start Date End Date    Assist patient in identifying behaviors that focus on relationship building and process the changes needed to improve relationships. PRN 03/24/25 --    Intervention Details: Duration of treatment until discharged.                        Reviewed By       Maida Heart [facility] 05/27/25 0534                     I have discussed and reviewed this treatment plan with the patient.

## 2025-05-27 NOTE — PLAN OF CARE
The patient currently has moved out of his house for temporarily, but he is hurt his wife is packing her bags.  Patient would like to work it out with his wife but she has to be willing.  The patient discussed that he loves his wife and he wants to make it work, but she has to help herself.  The patient is trying to do better by leaving when he becomes angry.

## 2025-05-27 NOTE — PROGRESS NOTES
"     Follow Up Therapy Office Visit      Date: 2025     Patient Name: Ozzie Le II  : 1990   Time In: 8:05  Time Out:8:42    PCP: Sean Fernandez MD    Chief Complaint:     ICD-10-CM ICD-9-CM   1. Adjustment disorder with mixed anxiety and depressed mood  F43.23 309.28   2. Trauma and stressor-related disorder  F43.9 309.81     308.9             History of Present Illness: Ozzie Le II is a 35 y.o. male who presents today for follow up therapy session. Patient arrived for session on time, clean and casually dressed without evidence of intoxication, withdrawal, or perceptual disturbance. Patient was cooperative and agreeable to treatment and interacted with therapist.  Patient was seen at the [place] office location.  The patient states that he stayed at a friend's house last night.  The patient states he and his wife got into an argument last night.  The patient discussed that his wife's hormones are through the roof, and needs to get her implant for birth control replaced.  She was supposed to have it replaced in February.  The patient states that last night was his \"breaking point\".  The patient loves his wife and wants to make it work, but he cannot take her criticizing, and complaining about everything he does.  Patient now leaves when he gets into an argument with his wife.  The patient discussed the last he had heard his wife is packing, and she plans on moving to [place] to live with her mom.  The patient states that he will stay in their place and [facility].  The patient discussed his concern for his daughter that is 5, and her daughter that is 10 from prior relationship.    Subjective      Review of Systems:   The following portions of the patient's history were reviewed and updated as appropriate: allergies, current medications, past family history, past medical history, past social history, past surgical history and problem list.    Past Medical History:   Past " Medical History:   Diagnosis Date    Abscess of sigmoid colon due to diverticulitis 10/30/2023    Asthma     Depression     Diverticulitis     Hypertension     Polycythemia vera     Primary erythrocytosis 2023       Past Surgical History:   Past Surgical History:   Procedure Laterality Date    COLON SURGERY N/A 2025    Dr. Irene took 12 inches off his colon out, [facility] in [place]    DENTAL PROCEDURE      HERNIA REPAIR      TONSILLECTOMY         Family History:   Family History   Problem Relation Age of Onset    Cancer Mother     Kidney disease Mother     Heart disease Father     Polycythemia Father        Social History:   Social History     Socioeconomic History    Marital status:    Tobacco Use    Smoking status: Former     Current packs/day: 0.00     Average packs/day: 1 pack/day for 18.0 years (18.0 ttl pk-yrs)     Types: Cigarettes     Start date: 2007     Quit date: 2025     Years since quittin.3     Passive exposure: Past    Smokeless tobacco: Current    Tobacco comments:     Pt states he is using nicotine pouches    Vaping Use    Vaping status: Never Used   Substance and Sexual Activity    Alcohol use: Not Currently    Drug use: Never    Sexual activity: Defer       Trauma History:  yes    Spiritual:  unknown    Mental Illness and/or Substance Abuse: The patient has been diagnosed with depression.      History: No    Medication:     Current Outpatient Medications:     cyclobenzaprine (FLEXERIL) 10 MG tablet, Take 1 tablet by mouth 3 (Three) Times a Day As Needed for Muscle Spasms., Disp: 30 tablet, Rfl: 0    Fluticasone-Umeclidin-Vilant (TRELEGY ELLIPTA) 200-62.5-25 MCG/ACT inhaler, Inhale 1 puff Daily. Rinse mouth with water after use. (Patient not taking: Reported on 3/19/2025), Disp: 60 each, Rfl: 5    ibuprofen (ADVIL,MOTRIN) 800 MG tablet, Take 1 tablet by mouth Every 6 (Six) Hours As Needed for Mild Pain., Disp: 60 tablet, Rfl: 0    lidocaine (LIDODERM) 5 %,  APPLY 1 PATCH TO THE AFFECTED AREA ONCE DAILY . LEAVE ON FOR 12 HOURS THEN OFF FOR 12 HOURS, Disp: , Rfl:     predniSONE (DELTASONE) 20 MG tablet, Take 2 tablets by mouth Daily., Disp: , Rfl:     Allergies:   No Known Allergies    Educational/Work History:  Highest level of education obtained: 12th grade  Employment Status: The patient is currently is unemployed, but door dashes to make money.    Significant Life Events:   Patient been through or witnessed a divorce? no  None.     Patient experienced a death / loss of relationship? yes  The patients dad  from a heart attack in 2020.    Patient experienced a major accident or tragic events? yes  The patients friend became paralyzed in 2019.    Patient experienced any other significant life events or trauma (such as verbal, physical, sexual abuse)? no  None.    Legal History:  The patient has no significant history of legal issues.  Court-ordered: No    PHQ-9 Score:   PHQ-9 Total Score: 1    YOUNG 7: Total Score: 0    Objective     Physical Exam:   There were no vitals taken for this visit. There is no height or weight on file to calculate BMI.     Physical Exam    Patient's Support Network Includes:  wife    Prognosis: Good with Ongoing Treatment     Mental Status Exam:   Hygiene:   good  Cooperation:  Cooperative  Eye Contact:  Good  Psychomotor Behavior:  Appropriate  Affect: Appropriate  Mood: Normal  Hopelessness: Denies  Speech:  Normal  Thought Process:  Goal directed  Thought Content:  Normal  Suicidal:  None  Homicidal:  None  Hallucinations:  None  Delusion:  None  Memory:  Intact  Orientation:  Person , place, time, and situation  Reliability:  good  Insight:  Good  Judgement:  Good  Impulse Control:  Good  Physical/Medical Issues:  No    Nothing has changed  Assessment / Plan      Assessment/Plan:   Diagnoses and all orders for this visit:    1. Adjustment disorder with mixed anxiety and depressed mood (Primary)    2. Trauma and  stressor-related disorder              The therapist will continue to promote the therapeutic alliance, address the patients issues and concerns, strengthen her self awareness, insights, and positive coping skills. Continue to work with the patient on ways to build to his marital relationship.  The therapist encouraged the patient to separate himself if he can for a short time and discussed some relaxation methods that might help him.  Nothing has changed.  TREATMENT PLAN/GOALS: Continue supportive psychotherapy efforts and medications as indicated. Treatment and medication options discussed during today's visit. Patient ackowledged and verbally consented to continue with current treatment plan and was educated on the importance of compliance with treatment and follow-up appointments.     Counseled patient regarding multimodal approach with healthy nutrition, healthy sleep, regular physical activity, social activities, counseling, and medications.      Coping skills reviewed and encouraged positive framing of thoughts. No suicidal ideation or homicidal ideation at this time.      Assisted patient in processing above session content; acknowledged and normalized patient’s thoughts, feelings, and concerns.  Applied  positive coping skills and behavior management in session.    Allowed patient to freely discuss issues without interruption or judgment. Provided safe, confidential environment to facilitate the development of positive therapeutic relationship and encourage open, honest communication. Assisted patient in identifying risk factors which would indicate the need for higher level of care including thoughts to harm self or others and/or self-harming behavior and encouraged patient to contact this office, call 911, or present to the nearest emergency room should any of these events occur. Discussed crisis intervention services and means to access.     Follow Up:   No follow-ups on file.    Maida Heart [facility]  This  document has been electronically signed by Maida Heart [facility]  May 27, 2025 09:12 EDT    Please note that portions of this note were completed with a voice recognition program. Efforts were made to edit dictation, but occasionally words are mistranscribed.

## 2025-05-28 ENCOUNTER — OFFICE VISIT (OUTPATIENT)
Dept: FAMILY MEDICINE CLINIC | Facility: CLINIC | Age: 35
End: 2025-05-28
Payer: MEDICAID

## 2025-05-28 VITALS
TEMPERATURE: 98.6 F | BODY MASS INDEX: 38.82 KG/M2 | DIASTOLIC BLOOD PRESSURE: 86 MMHG | OXYGEN SATURATION: 95 % | RESPIRATION RATE: 16 BRPM | HEART RATE: 74 BPM | HEIGHT: 75 IN | SYSTOLIC BLOOD PRESSURE: 132 MMHG | WEIGHT: 312.2 LBS

## 2025-05-28 DIAGNOSIS — M62.830 LUMBAR PARASPINAL MUSCLE SPASM: ICD-10-CM

## 2025-05-28 DIAGNOSIS — Z00.00 WELL ADULT EXAM: Primary | ICD-10-CM

## 2025-05-28 DIAGNOSIS — E66.812 CLASS 2 SEVERE OBESITY DUE TO EXCESS CALORIES WITH SERIOUS COMORBIDITY AND BODY MASS INDEX (BMI) OF 39.0 TO 39.9 IN ADULT: ICD-10-CM

## 2025-05-28 DIAGNOSIS — J45.41 MODERATE PERSISTENT ASTHMA WITH ACUTE EXACERBATION: ICD-10-CM

## 2025-05-28 DIAGNOSIS — E66.01 CLASS 2 SEVERE OBESITY DUE TO EXCESS CALORIES WITH SERIOUS COMORBIDITY AND BODY MASS INDEX (BMI) OF 39.0 TO 39.9 IN ADULT: ICD-10-CM

## 2025-05-28 DIAGNOSIS — F17.210 CIGARETTE NICOTINE DEPENDENCE WITHOUT COMPLICATION: Chronic | ICD-10-CM

## 2025-05-28 DIAGNOSIS — M54.42 ACUTE MIDLINE LOW BACK PAIN WITH LEFT-SIDED SCIATICA: ICD-10-CM

## 2025-05-28 DIAGNOSIS — E78.1 HYPERTRIGLYCERIDEMIA: ICD-10-CM

## 2025-05-28 DIAGNOSIS — G47.33 OSA (OBSTRUCTIVE SLEEP APNEA): ICD-10-CM

## 2025-05-28 DIAGNOSIS — J30.9 CHRONIC ALLERGIC RHINITIS: ICD-10-CM

## 2025-05-28 RX ORDER — IBUPROFEN 800 MG/1
800 TABLET, FILM COATED ORAL EVERY 6 HOURS PRN
Qty: 60 TABLET | Refills: 0 | Status: SHIPPED | OUTPATIENT
Start: 2025-05-28

## 2025-05-28 RX ORDER — METHYLPREDNISOLONE ACETATE 80 MG/ML
80 INJECTION, SUSPENSION INTRA-ARTICULAR; INTRALESIONAL; INTRAMUSCULAR; SOFT TISSUE ONCE
Status: COMPLETED | OUTPATIENT
Start: 2025-05-28 | End: 2025-05-28

## 2025-05-28 RX ORDER — CYCLOBENZAPRINE HCL 10 MG
10 TABLET ORAL 3 TIMES DAILY PRN
Qty: 30 TABLET | Refills: 0 | Status: SHIPPED | OUTPATIENT
Start: 2025-05-28

## 2025-05-28 RX ORDER — PREDNISONE 10 MG/1
TABLET ORAL
Qty: 15 TABLET | Refills: 0 | Status: SHIPPED | OUTPATIENT
Start: 2025-05-29 | End: 2025-06-03

## 2025-05-28 RX ORDER — CEFTRIAXONE 1 G/1
1 INJECTION, POWDER, FOR SOLUTION INTRAMUSCULAR; INTRAVENOUS ONCE
Status: COMPLETED | OUTPATIENT
Start: 2025-05-28 | End: 2025-05-28

## 2025-05-28 RX ORDER — MONTELUKAST SODIUM 10 MG/1
10 TABLET ORAL NIGHTLY
Qty: 90 TABLET | Refills: 3 | Status: SHIPPED | OUTPATIENT
Start: 2025-05-28

## 2025-05-28 RX ORDER — CETIRIZINE HYDROCHLORIDE 10 MG/1
10 TABLET ORAL DAILY
Qty: 30 TABLET | Refills: 5 | Status: SHIPPED | OUTPATIENT
Start: 2025-05-28

## 2025-05-28 RX ORDER — IPRATROPIUM BROMIDE AND ALBUTEROL SULFATE 2.5; .5 MG/3ML; MG/3ML
SOLUTION RESPIRATORY (INHALATION)
Qty: 360 ML | Refills: 6 | Status: SHIPPED | OUTPATIENT
Start: 2025-05-28 | End: 2025-06-27

## 2025-05-28 RX ORDER — FLUTICASONE PROPIONATE 50 MCG
2 SPRAY, SUSPENSION (ML) NASAL DAILY
Qty: 16 G | Refills: 5 | Status: SHIPPED | OUTPATIENT
Start: 2025-05-28

## 2025-05-28 RX ORDER — AZITHROMYCIN 250 MG/1
TABLET, FILM COATED ORAL
Qty: 6 TABLET | Refills: 0 | Status: SHIPPED | OUTPATIENT
Start: 2025-05-28

## 2025-05-28 RX ORDER — ALBUTEROL SULFATE 90 UG/1
2 INHALANT RESPIRATORY (INHALATION) EVERY 4 HOURS PRN
Qty: 18 G | Refills: 1 | Status: SHIPPED | OUTPATIENT
Start: 2025-05-28

## 2025-05-28 RX ADMIN — CEFTRIAXONE 1 G: 1 INJECTION, POWDER, FOR SOLUTION INTRAMUSCULAR; INTRAVENOUS at 10:46

## 2025-05-28 RX ADMIN — METHYLPREDNISOLONE ACETATE 80 MG: 80 INJECTION, SUSPENSION INTRA-ARTICULAR; INTRALESIONAL; INTRAMUSCULAR; SOFT TISSUE at 10:47

## 2025-05-28 NOTE — PROGRESS NOTES
Male Physical Note      Date: 2025   Patient Name: Ozzie Le II  : 1990   MRN: [id number]     Chief Complaint:    Chief Complaint   Patient presents with    Annual Exam     Pt is here for annual physical. He has been having some issues with allergies and now may have a respiratory issue. Mucus, coughing and congestion present daily.      History of Present Illness  Ozzie Le II is a 35 y.o. male who is here today for their annual health maintenance and physical. He also presents for evaluation of a persistent cough and intermittent shortness of breath.    He reports a persistent cough with an infectious taste, decreasing in intensity over the past 1.5 weeks. He has not used Trelegy for the past few weeks due to being away from home and currently lacks inhalers, using DayQuil instead. He prefers a nebulizer, having vials at home but no machine. He reports a rattling sensation in his throat triggering coughing spells.     He smokes approximately one pack per day, acknowledging this may exacerbate his symptoms. He had previously quit smoking for 1.5 to 2 months but resumed due to stress. He plans to quit again, noting improvement during cessation.    He reports no abdominal pain and normal urination.    He has an appointment with Angelia Oswald next Thursday for hematology.    SOCIAL HISTORY  The patient admits to smoking about a pack a day.    Subjective      I have reviewed the following portions of the patient's history and these were updated and discussed with the patient as appropriate: past family history, past medical history, past social history, past surgical history, and problem list.      Medications:     Current Outpatient Medications:     cyclobenzaprine (FLEXERIL) 10 MG tablet, Take 1 tablet by mouth 3 (Three) Times a Day As Needed for Muscle Spasms., Disp: 30 tablet, Rfl: 0    Fluticasone-Umeclidin-Vilant (TRELEGY ELLIPTA) 200-62.5-25 MCG/ACT inhaler, Inhale 1 puff  "Daily. Rinse mouth with water after use., Disp: 60 each, Rfl: 5    ibuprofen (ADVIL,MOTRIN) 800 MG tablet, Take 1 tablet by mouth Every 6 (Six) Hours As Needed for Mild Pain., Disp: 60 tablet, Rfl: 0    albuterol sulfate  (90 Base) MCG/ACT inhaler, Inhale 2 puffs Every 4 (Four) Hours As Needed for Wheezing or Shortness of Air., Disp: 18 g, Rfl: 1    azithromycin (Zithromax Z-Cristi) 250 MG tablet, Take 2 tablets by mouth on day 1, then 1 tablet daily on days 2-5., Disp: 6 tablet, Rfl: 0    cetirizine (zyrTEC) 10 MG tablet, Take 1 tablet by mouth Daily., Disp: 30 tablet, Rfl: 5    fluticasone (FLONASE) 50 MCG/ACT nasal spray, Administer 2 sprays into the nostril(s) as directed by provider Daily., Disp: 16 g, Rfl: 5    ipratropium-albuterol (DUO-NEB) 0.5-2.5 mg/3 ml nebulizer, Take 3 mL by nebulization 3 times a day for 14 days, THEN 3 mL 4 (Four) Times a Day for 16 days., Disp: 360 mL, Rfl: 6    montelukast (Singulair) 10 MG tablet, Take 1 tablet by mouth Every Night., Disp: 90 tablet, Rfl: 3    Allergies:   No Known Allergies    Immunizations:  Immunization History   Administered Date(s) Administered    31-influenza Vac Quardvalent Preservativ 09/28/2016    Pneumococcal Conjugate 20-Valent (PCV20) 05/02/2024    Tdap 04/28/2008      Diet/Physical activity:Physical activity minimal. Diet poor.    PHQ-9 Depression Screening  The PHQ has not been completed during this encounter.     Objective     Physical Exam:  Vital Signs:   Vitals:    05/28/25 0834   BP: 132/86   Pulse: 74   Resp: 16   Temp: 98.6 °F (37 °C)   TempSrc: Oral   SpO2: 95%   Weight: (!) 142 kg (312 lb 3.2 oz)   Height: 190.5 cm (75\")     Body mass index is 39.02 kg/m².     Physical Exam  Vitals reviewed.   Constitutional:       General: He is not in acute distress.     Appearance: Normal appearance. He is obese. He is not ill-appearing.   HENT:      Head: Normocephalic and atraumatic.      Right Ear: Tympanic membrane, ear canal and external ear " normal.      Left Ear: Tympanic membrane, ear canal and external ear normal.      Nose: Nose normal. No congestion or rhinorrhea.      Mouth/Throat:      Mouth: Mucous membranes are moist.      Pharynx: No oropharyngeal exudate or posterior oropharyngeal erythema.   Eyes:      General: No scleral icterus.        Right eye: No discharge.         Left eye: No discharge.      Extraocular Movements: Extraocular movements intact.      Conjunctiva/sclera: Conjunctivae normal.   Cardiovascular:      Rate and Rhythm: Normal rate and regular rhythm.      Heart sounds: Normal heart sounds. No murmur heard.  Pulmonary:      Effort: Pulmonary effort is normal. No respiratory distress.      Breath sounds: No stridor. Wheezing and rales present. No rhonchi.   Chest:      Chest wall: No tenderness.   Abdominal:      General: Bowel sounds are normal. There is no distension.      Palpations: Abdomen is soft. There is no mass.      Tenderness: There is no abdominal tenderness. There is no guarding or rebound.      Hernia: No hernia is present.   Musculoskeletal:      Cervical back: Normal range of motion and neck supple. No rigidity or tenderness.      Lumbar back: Spasms and tenderness present. No swelling, edema, deformity, signs of trauma, lacerations or bony tenderness. Normal range of motion. Positive left straight leg raise test. Negative right straight leg raise test. No scoliosis.      Right lower leg: No edema.      Left lower leg: No edema.   Lymphadenopathy:      Cervical: No cervical adenopathy.   Skin:     General: Skin is warm and dry.   Neurological:      Mental Status: He is alert and oriented to person, place, and time. Mental status is at baseline.   Psychiatric:         Mood and Affect: Mood normal.         Behavior: Behavior normal.         Thought Content: Thought content normal.         Judgment: Judgment normal.              Assessment / Plan      Assessment/Plan:   Diagnoses and all orders for this  visit:    1. Well adult exam (Primary)  -     Basic metabolic panel    2. Class 2 severe obesity due to excess calories with serious comorbidity and body mass index (BMI) of 39.0 to 39.9 in adult    3. Moderate persistent asthma with acute exacerbation  -     methylPREDNISolone acetate (DEPO-medrol) injection 80 mg  -     cefTRIAXone (ROCEPHIN) injection 1 g  -     Home Nebulizer  -     ipratropium-albuterol (DUO-NEB) 0.5-2.5 mg/3 ml nebulizer; Take 3 mL by nebulization 3 times a day for 14 days, THEN 3 mL 4 (Four) Times a Day for 16 days.  Dispense: 360 mL; Refill: 6  -     albuterol sulfate  (90 Base) MCG/ACT inhaler; Inhale 2 puffs Every 4 (Four) Hours As Needed for Wheezing or Shortness of Air.  Dispense: 18 g; Refill: 1  -     Fluticasone-Umeclidin-Vilant (TRELEGY ELLIPTA) 200-62.5-25 MCG/ACT inhaler; Inhale 1 puff Daily. Rinse mouth with water after use.  Dispense: 60 each; Refill: 5  -     montelukast (Singulair) 10 MG tablet; Take 1 tablet by mouth Every Night.  Dispense: 90 tablet; Refill: 3  -     azithromycin (Zithromax Z-Cristi) 250 MG tablet; Take 2 tablets by mouth on day 1, then 1 tablet daily on days 2-5.  Dispense: 6 tablet; Refill: 0  -     predniSONE (DELTASONE) 10 MG tablet; Take 5 tablets by mouth Daily for 1 day, THEN 4 tablets Daily for 1 day, THEN 3 tablets Daily for 1 day, THEN 2 tablets Daily for 1 day, THEN 1 tablet Daily for 1 day. Start 5/29/25  Dispense: 15 tablet; Refill: 0    4. Acute midline low back pain with left-sided sciatica  -     cyclobenzaprine (FLEXERIL) 10 MG tablet; Take 1 tablet by mouth 3 (Three) Times a Day As Needed for Muscle Spasms.  Dispense: 30 tablet; Refill: 0  -     ibuprofen (ADVIL,MOTRIN) 800 MG tablet; Take 1 tablet by mouth Every 6 (Six) Hours As Needed for Mild Pain.  Dispense: 60 tablet; Refill: 0    5. BRIGIDA (obstructive sleep apnea)    6. Hypertriglyceridemia    7. Chronic allergic rhinitis  -     cetirizine (zyrTEC) 10 MG tablet; Take 1 tablet by  mouth Daily.  Dispense: 30 tablet; Refill: 5  -     fluticasone (FLONASE) 50 MCG/ACT nasal spray; Administer 2 sprays into the nostril(s) as directed by provider Daily.  Dispense: 16 g; Refill: 5       Assessment & Plan  - Patient is Current tobacco user not ready to quit  - Reviewed care gaps and recommended screening tests with patient.    Asthma exacerbation - uncontrolled  - Symptoms: cough, shortness of breath, wheezing  - Physical exam: tightness and wheezing, limited effectiveness of DayQuil  - Prescriptions: Singulair at night, Claritin/Zyrtec/Allegra during the day, Flonase nasal spray for 2-3 weeks (max 4 weeks), Trelegy refill  - Provided nebulizer for home use, instructed to perform treatments 3 times daily until symptoms improve (~1 week), increase frequency to every 2-4 hours if severe symptoms  - Administered steroid injection today, start oral steroid taper tomorrow, prescribed Z-Cristi  - Advised smoking cessation  - Seek immediate medical attention if >6 nebulizer treatments/day without improvement    Acute low back pain/Lumbar muscle spasm:  - Refills for ibuprofen and Flexeril provided  - Take Flexeril at night, avoid driving after taking it  - Only metabolic panel needed today  - Follow-up in 2 weeks for asthma control    Chronic AR:  - Prescribed Singulair for nighttime use  - Recommended Claritin/Zyrtec/Allegra during the day for next few weeks  - Advised Flonase nasal spray for 2-3 weeks, up to 4 weeks if needed  - Provided counseling on allergy management and medication adherence    Smoking Cessation:  - Patient smoking about a pack a day  - Advised to quit smoking to aid asthma recovery  - Provided counseling on benefits of smoking cessation and its impact on respiratory health  - Encouraged to reduce smoking during exacerbation symptoms      Class 2 Severe Obesity (BMI >=35 and <=39.9). Obesity-related health conditions include the following: obstructive sleep apnea, hypertension, and  dyslipidemias. Obesity is worsening. BMI is is above average; BMI management plan is completed. We discussed portion control, increasing exercise, and Information on healthy weight added to patient's after visit summary.       Follow Up:   Return in about 2 weeks (around 6/11/2025) for Recheck asthma.    Healthcare Maintenance:   Counseling provided on exercise, nutrition, age-appropriate screening test, and appropriate lab studies.   Ozzie Le II voices understanding and acceptance of this advice and will call back with any further questions or concerns. AVS with preventive healthcare tips printed for patient.     Patient or patient representative verbalized consent for the use of Ambient Listening during the visit with  Sean Fernandez MD for chart documentation. 6/5/2025  09:00 EDT     Sean Fernandez MD   [facility]

## 2025-05-28 NOTE — PATIENT INSTRUCTIONS
You have an appointment with Angelia Oswald PA-C. Hematology in [place] Thursday 6/5/25 at 12:00pm  Address:  [address] [place] [address]    Call Dr. Cates's office to set up appointment.

## 2025-05-29 ENCOUNTER — RESULTS FOLLOW-UP (OUTPATIENT)
Dept: FAMILY MEDICINE CLINIC | Facility: CLINIC | Age: 35
End: 2025-05-29
Payer: MEDICAID

## 2025-05-29 LAB
BUN SERPL-MCNC: 9 MG/DL (ref 6–20)
BUN/CREAT SERPL: 8.7 (ref 7–25)
CALCIUM SERPL-MCNC: 8.9 MG/DL (ref 8.6–10.5)
CHLORIDE SERPL-SCNC: 106 MMOL/L (ref 98–107)
CO2 SERPL-SCNC: 25 MMOL/L (ref 22–29)
CREAT SERPL-MCNC: 1.03 MG/DL (ref 0.76–1.27)
EGFRCR SERPLBLD CKD-EPI 2021: 97.8 ML/MIN/1.73
GLUCOSE SERPL-MCNC: 88 MG/DL (ref 65–99)
POTASSIUM SERPL-SCNC: 4.3 MMOL/L (ref 3.5–5.2)
SODIUM SERPL-SCNC: 142 MMOL/L (ref 136–145)

## 2025-05-29 NOTE — TELEPHONE ENCOUNTER
Spoke with pt, gave him results, he voiced understanding and thanked me for the call.     Results reviewed. Please call patient and let them know that BMP unremarkable.

## 2025-06-27 ENCOUNTER — OFFICE VISIT (OUTPATIENT)
Dept: BEHAVIORAL HEALTH | Facility: CLINIC | Age: 35
End: 2025-06-27
Payer: MEDICAID

## 2025-06-27 DIAGNOSIS — F43.23 ADJUSTMENT DISORDER WITH MIXED ANXIETY AND DEPRESSED MOOD: Primary | ICD-10-CM

## 2025-06-27 DIAGNOSIS — Z63.5 FAMILY DISRUPTION DUE TO DIVORCE OR LEGAL SEPARATION: ICD-10-CM

## 2025-06-27 SDOH — SOCIAL STABILITY - SOCIAL INSECURITY: DISRUPTION OF FAMILY BY SEPARATION AND DIVORCE: Z63.5

## 2025-06-27 NOTE — PROGRESS NOTES
Follow Up Therapy Office Visit      Date: 2025     Patient Name: Ozzie Le II  : 1990   Time In: 8:05  Time Out:8:50    PCP: Sean Fernandez MD    Chief Complaint:     ICD-10-CM ICD-9-CM   1. Adjustment disorder with mixed anxiety and depressed mood  F43.23 309.28   2. Family disruption due to divorce or legal separation  Z63.5 V61.03               History of Present Illness: Ozzie Le II is a 35 y.o. male who presents today for follow up therapy session. Patient arrived for session on time, clean and casually dressed without evidence of intoxication, withdrawal, or perceptual disturbance. Patient was cooperative and agreeable to treatment and interacted with therapist.  Patient was seen at the [place] office location.   The patient Is living at home, and his wife is living with her mother in [place]. The patient discussed that they are currently  now, and unsure of when they are getting back together, or if they are going to get back together.  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  The patient and the therapist discussed his relationship when they were together, and how she accused him of cheating and he accused her of cheating.  The patient discussed that his wife cheated on him in the past and he feels that she might be cheating on him again.     Subjective      Review of Systems:   The following portions of the patient's history were reviewed and updated as appropriate: allergies, current medications, past family history, past medical history, past social history, past surgical history and problem list.    Past Medical History:   Past Medical History:   Diagnosis Date    Abscess of sigmoid colon due to diverticulitis 10/30/2023    Asthma     Depression     Diverticulitis     Hypertension     Polycythemia vera     Primary erythrocytosis 2023       Past Surgical History:   Past Surgical History:   Procedure  Laterality Date    COLON SURGERY N/A 2025    Dr. Irene took 12 inches off his colon out, [facility] in [place]    DENTAL PROCEDURE      HERNIA REPAIR      TONSILLECTOMY         Family History:   Family History   Problem Relation Age of Onset    Cancer Mother     Kidney disease Mother     Heart disease Father     Polycythemia Father        Social History:   Social History     Socioeconomic History    Marital status:    Tobacco Use    Smoking status: Former     Current packs/day: 0.00     Average packs/day: 1 pack/day for 18.0 years (18.0 ttl pk-yrs)     Types: Cigarettes     Start date: 2007     Quit date: 2025     Years since quittin.4     Passive exposure: Past    Smokeless tobacco: Current    Tobacco comments:     Pt states he is using nicotine pouches    Vaping Use    Vaping status: Never Used   Substance and Sexual Activity    Alcohol use: Not Currently    Drug use: Never    Sexual activity: Defer       Trauma History:  yes    Spiritual:  unknown    Mental Illness and/or Substance Abuse: The patient has been diagnosed with depression.      History: No    Medication:     Current Outpatient Medications:     albuterol sulfate  (90 Base) MCG/ACT inhaler, Inhale 2 puffs Every 4 (Four) Hours As Needed for Wheezing or Shortness of Air., Disp: 18 g, Rfl: 1    azithromycin (Zithromax Z-Cristi) 250 MG tablet, Take 2 tablets by mouth on day 1, then 1 tablet daily on days 2-5., Disp: 6 tablet, Rfl: 0    cetirizine (zyrTEC) 10 MG tablet, Take 1 tablet by mouth Daily., Disp: 30 tablet, Rfl: 5    cyclobenzaprine (FLEXERIL) 10 MG tablet, Take 1 tablet by mouth 3 (Three) Times a Day As Needed for Muscle Spasms., Disp: 30 tablet, Rfl: 0    fluticasone (FLONASE) 50 MCG/ACT nasal spray, Administer 2 sprays into the nostril(s) as directed by provider Daily., Disp: 16 g, Rfl: 5    Fluticasone-Umeclidin-Vilant (TRELEGY ELLIPTA) 200-62.5-25 MCG/ACT inhaler, Inhale 1 puff Daily. Rinse mouth with  water after use., Disp: 60 each, Rfl: 5    ibuprofen (ADVIL,MOTRIN) 800 MG tablet, Take 1 tablet by mouth Every 6 (Six) Hours As Needed for Mild Pain., Disp: 60 tablet, Rfl: 0    ipratropium-albuterol (DUO-NEB) 0.5-2.5 mg/3 ml nebulizer, Take 3 mL by nebulization 3 times a day for 14 days, THEN 3 mL 4 (Four) Times a Day for 16 days., Disp: 360 mL, Rfl: 6    montelukast (Singulair) 10 MG tablet, Take 1 tablet by mouth Every Night., Disp: 90 tablet, Rfl: 3    Allergies:   No Known Allergies    Educational/Work History:  Highest level of education obtained: 12th grade  Employment Status: The patient is currently is unemployed, but door dashes to make money.    Significant Life Events:   Patient been through or witnessed a divorce? no  None.     Patient experienced a death / loss of relationship? yes  The patients dad  from a heart attack in 2020.    Patient experienced a major accident or tragic events? yes  The patients friend became paralyzed in 2019.    Patient experienced any other significant life events or trauma (such as verbal, physical, sexual abuse)? no  None.    Legal History:  The patient has no significant history of legal issues.  Court-ordered: No    PHQ-9 Score:   PHQ-9 Total Score:0    YOUNG 7: Total Score: 0    Objective     Physical Exam:   There were no vitals taken for this visit. There is no height or weight on file to calculate BMI.     Physical Exam    Patient's Support Network Includes:  wife    Prognosis: Good with Ongoing Treatment     Mental Status Exam:   Hygiene:   good  Cooperation:  Cooperative  Eye Contact:  Good  Psychomotor Behavior:  Appropriate  Affect: Appropriate  Mood: Normal  Hopelessness: Denies  Speech:  Normal  Thought Process:  Goal directed  Thought Content:  Normal  Suicidal:  None  Homicidal:  None  Hallucinations:  None  Delusion:  None  Memory:  Intact  Orientation:  Person , place, time, and situation  Reliability:  good  Insight:   Good  Judgement:  Good  Impulse Control:  Good  Physical/Medical Issues:  No    Nothing has changed  Assessment / Plan      Assessment/Plan:   Diagnoses and all orders for this visit:    1. Adjustment disorder with mixed anxiety and depressed mood (Primary)    2. Family disruption due to divorce or legal separation                The therapist will continue to promote the therapeutic alliance, address the patients issues and concerns, strengthen her self awareness, insights, and positive coping skills. Continue to work with the patient on ways to build to his marital relationship if he chooses to.  The therapist encouraged to discuss with his wife with the future holds in regards to their relationship.  Also understands that it is going to be hard but they need to both give a [employer] to each other, and go on dates.    TREATMENT PLAN/GOALS: Continue supportive psychotherapy efforts and medications as indicated. Treatment and medication options discussed during today's visit. Patient ackowledged and verbally consented to continue with current treatment plan and was educated on the importance of compliance with treatment and follow-up appointments.     Counseled patient regarding multimodal approach with healthy nutrition, healthy sleep, regular physical activity, social activities, counseling, and medications.      Coping skills reviewed and encouraged positive framing of thoughts. No suicidal ideation or homicidal ideation at this time.      Assisted patient in processing above session content; acknowledged and normalized patient’s thoughts, feelings, and concerns.  Applied  positive coping skills and behavior management in session.    Allowed patient to freely discuss issues without interruption or judgment. Provided safe, confidential environment to facilitate the development of positive therapeutic relationship and encourage open, honest communication. Assisted patient in identifying risk factors which would  indicate the need for higher level of care including thoughts to harm self or others and/or self-harming behavior and encouraged patient to contact this office, call 911, or present to the nearest emergency room should any of these events occur. Discussed crisis intervention services and means to access.     Follow Up:   No follow-ups on file.    JOANIE Santos  This document has been electronically signed by JOANIE Santos  June 27, 2025 16:48 EDT    Please note that portions of this note were completed with a voice recognition program. Efforts were made to edit dictation, but occasionally words are mistranscribed.

## 2025-06-27 NOTE — PLAN OF CARE
The patient continues to be  from his wife, and is unsure with her future will be like.  Discussed the importance of working on their relationship for getting the past and move forward.

## 2025-06-27 NOTE — TREATMENT PLAN
Multi-Disciplinary Problems (from Behavioral Health Treatment Plan)      Active Problems       Problem: Ineffective Communication  Start Date: 06/27/25      Problem Details: The patient self-scales this problem as a 6 with 10 being the worst.          Goal Priority Start Date Expected End Date End Date    Patient will demonstrate the ability to initiate new communication patterns outside of sessions on a consistent basis. Medium 06/27/25 11/25/25 --    Goal Details: Progress toward goal:  The patient self-scales their progress related to this goal as a 5 with 10 being the worst.        Goal Intervention Frequency Start Date End Date    Encourage understanding of past experiences that affect current communication style and educate about healthy communication patterns. PRN 06/27/25 --    Intervention Details: Duration of treatment until discharged.                Problem: Trauma and Stressor Related Disorders  Start Date: 06/27/25      Problem Details: The patient self-scales this problem as a 6 with 10 being the worst.          Goal Priority Start Date Expected End Date End Date    Patient will process and move through trauma in a way that improves self regard and the patients ability to function optimally in the world around them. Medium 06/27/25 11/25/25 --    Goal Details: Progress toward goal:  The patient self-scales their progress related to this goal as a 6 with 10 being the worst.        Goal Intervention Frequency Start Date End Date    Assist patient in identifying ways that trauma has negatively impacted their view of themselves and the world. PRN 06/27/25 --    Intervention Details: Duration of treatment until discharged.        Goal Intervention Frequency Start Date End Date    Process trauma in the context of the safe session environment. PRN 06/27/25 --    Intervention Details: Duration of treatment until discharged.        Goal Intervention Frequency Start Date End Date    Develop a plan of behavior  changes that will reduce the stress of the trauma. PRN 06/27/25 --    Intervention Details: Duration of treatment until discharged.                Problem: Relationship Issues  Start Date: 06/27/25      Problem Details: The patient self-scales this problem as a 8 with 10 being the worst.          Goal Priority Start Date Expected End Date End Date    Patient will initiate personal change to improve relationships. High 06/27/25 11/25/25 --    Goal Details: Progress toward goal:  The patient self-scales their progress related to this goal as a 8 with 10 being the worst.        Goal Intervention Frequency Start Date End Date    Assist patient in identifying behaviors that focus on relationship building and process the changes needed to improve relationships. PRN 06/27/25 --    Intervention Details: Duration of treatment until discharged.                        Reviewed By       Maida [facility] 06/27/25 [id number]                     I have discussed and reviewed this treatment plan with the patient.

## 2025-07-15 ENCOUNTER — OFFICE VISIT (OUTPATIENT)
Dept: FAMILY MEDICINE CLINIC | Facility: CLINIC | Age: 35
End: 2025-07-15
Payer: MEDICAID

## 2025-07-15 VITALS
HEART RATE: 94 BPM | BODY MASS INDEX: 39.17 KG/M2 | HEIGHT: 75 IN | RESPIRATION RATE: 16 BRPM | WEIGHT: 315 LBS | TEMPERATURE: 98.7 F | DIASTOLIC BLOOD PRESSURE: 88 MMHG | SYSTOLIC BLOOD PRESSURE: 158 MMHG | OXYGEN SATURATION: 96 %

## 2025-07-15 DIAGNOSIS — M54.42 ACUTE MIDLINE LOW BACK PAIN WITH LEFT-SIDED SCIATICA: ICD-10-CM

## 2025-07-15 DIAGNOSIS — K43.9 HERNIA OF ABDOMINAL WALL: Primary | ICD-10-CM

## 2025-07-15 DIAGNOSIS — R10.84 GENERALIZED ABDOMINAL PAIN: ICD-10-CM

## 2025-07-15 DIAGNOSIS — K59.00 CONSTIPATION, UNSPECIFIED CONSTIPATION TYPE: ICD-10-CM

## 2025-07-15 PROCEDURE — 3079F DIAST BP 80-89 MM HG: CPT | Performed by: FAMILY MEDICINE

## 2025-07-15 PROCEDURE — 3077F SYST BP >= 140 MM HG: CPT | Performed by: FAMILY MEDICINE

## 2025-07-15 PROCEDURE — 1125F AMNT PAIN NOTED PAIN PRSNT: CPT | Performed by: FAMILY MEDICINE

## 2025-07-15 PROCEDURE — 99214 OFFICE O/P EST MOD 30 MIN: CPT | Performed by: FAMILY MEDICINE

## 2025-07-15 PROCEDURE — 1160F RVW MEDS BY RX/DR IN RCRD: CPT | Performed by: FAMILY MEDICINE

## 2025-07-15 PROCEDURE — 1159F MED LIST DOCD IN RCRD: CPT | Performed by: FAMILY MEDICINE

## 2025-07-15 RX ORDER — IBUPROFEN 800 MG/1
800 TABLET, FILM COATED ORAL EVERY 6 HOURS PRN
Qty: 60 TABLET | Refills: 0 | Status: SHIPPED | OUTPATIENT
Start: 2025-07-15

## 2025-07-15 RX ORDER — POLYETHYLENE GLYCOL 3350 17 G/17G
17 POWDER, FOR SOLUTION ORAL DAILY
Qty: 510 G | Refills: 1 | Status: SHIPPED | OUTPATIENT
Start: 2025-07-15

## 2025-07-15 RX ORDER — CYCLOBENZAPRINE HCL 10 MG
10 TABLET ORAL 3 TIMES DAILY PRN
Qty: 30 TABLET | Refills: 0 | Status: SHIPPED | OUTPATIENT
Start: 2025-07-15

## 2025-07-15 NOTE — PROGRESS NOTES
"    Office Note     Name: Ozzie Le II  : 1990   MRN: [id number]     Chief Complaint:  Abdominal Pain (Pt has had pain and a bulge in middle abdomin. He thinks the chiropractor visit may have pulled a muscle or possible hernia.  He has had issues with his bowels lately as well. )    Subjective        History of Present Illness:  Ozzie Le II is a 35 y.o. male who presents today for evaluation of a hernia.    He reports a lump in his abdomen, noticeable when lying down or coughing, occasionally causing discomfort. He is considering a scan due to changes in bowel movements, feeling blocked. He has experienced pain on the opposite side of his abdomen for 1-2 days. No heavy lifting for several weeks. Chiropractic care for 2 weeks has improved leg pain but may have contributed to his current condition. Bowel movements are infrequent, small, and often resemble diarrhea. He drinks about four 32-ounce bottles of Gatorade daily but not much water. He acknowledges low fiber intake. Diet is poor, typically one meal per day, often eaten out. Home meals usually include chicken, fruits, and rice. History of colectomy and hernia repair.    Diet: Poor, typically one meal per day, often eaten out. Home meals usually include chicken, fruits, and rice.    PAST SURGICAL HISTORY:  - Colectomy  - Hernia repair    I have reviewed the following portions of the patient's history and these were updated and discussed with the patient as appropriate: past family history, past medical history, past social history, past surgical history, and problem list.     Objective     Vital Signs  /88   Pulse 94   Temp 98.7 °F (37.1 °C) (Oral)   Resp 16   Ht 190.5 cm (75\")   Wt (!) 145 kg (320 lb 3.2 oz)   SpO2 96%   BMI 40.02 kg/m²   Estimated body mass index is 40.02 kg/m² as calculated from the following:    Height as of this encounter: 190.5 cm (75\").    Weight as of this encounter: 145 kg (320 lb 3.2 " oz).    Physical Exam  Vitals reviewed.   Constitutional:       General: He is not in acute distress.     Appearance: Normal appearance. He is obese. He is not ill-appearing.   HENT:      Head: Normocephalic.   Eyes:      Extraocular Movements: Extraocular movements intact.   Cardiovascular:      Rate and Rhythm: Normal rate.   Pulmonary:      Effort: Pulmonary effort is normal.      Breath sounds: Normal breath sounds.   Abdominal:      General: There is no distension.      Palpations: Abdomen is soft. There is no mass.      Tenderness: There is no abdominal tenderness. There is no guarding or rebound.      Hernia: A hernia is present.   Skin:     General: Skin is warm and dry.      Comments: Surgical scars on abdomen   Neurological:      Mental Status: He is alert and oriented to person, place, and time.   Psychiatric:         Mood and Affect: Mood normal.         Behavior: Behavior normal.            Assessment and Plan     Diagnoses and all orders for this visit:    1. Hernia of abdominal wall (Primary)    2. Constipation, unspecified constipation type  -     polyethylene glycol (MIRALAX) 17 GM/SCOOP powder; Take 17 g by mouth Daily.  Dispense: 510 g; Refill: 1  -     Cancel: XR Abdomen KUB (In Office)  -     XR Abdomen KUB (In Office); Future    3. Acute midline low back pain with left-sided sciatica  -     cyclobenzaprine (FLEXERIL) 10 MG tablet; Take 1 tablet by mouth 3 (Three) Times a Day As Needed for Muscle Spasms.  Dispense: 30 tablet; Refill: 0  -     ibuprofen (ADVIL,MOTRIN) 800 MG tablet; Take 1 tablet by mouth Every 6 (Six) Hours As Needed for Mild Pain.  Dispense: 60 tablet; Refill: 0    4. Generalized abdominal pain  -     polyethylene glycol (MIRALAX) 17 GM/SCOOP powder; Take 17 g by mouth Daily.  Dispense: 510 g; Refill: 1  -     Cancel: XR Abdomen KUB (In Office)  -     XR Abdomen KUB (In Office); Future         Hernia  - Lump noticeable when lying down and coughing, suggesting a hernia  -  Palpable hernia on exam without tenderness  - Discussed potential need for surgery and use of a hernia belt  - Advised to monitor symptoms and consider surgery if they persist or worsen    Constipation  - Infrequent, small bowel movements indicating constipation  - Poor hydration and low fiber intake noted  - Ordered x-ray to assess severity  - Prescribed MiraLAX, one scoop daily  - Discussed potential need for more aggressive regimen if significant impaction is found    Back pain  - Stable  - Patient seeing chiropractor which has helped  - Refill flexeril and ibuprofen      Discussion Summary:     Discussed plan of care in detail with patient today. Patient was encouraged to keep me informed of any acute changes, lack of improvement, or any new concerning symptoms.  Patient is also aware of reasons to seek emergent care. Patient verbalized understanding and agrees with plan of care.    This visit was billed based on time.  I spent 30 minutes caring for Ozzie Le II on this date of service. This time includes time spent by me in the following activities:preparing for the visit, reviewing tests, obtaining and/or reviewing a separately obtained history, performing a medically appropriate examination and/or evaluation , counseling and educating the patient/family/caregiver, ordering medications, tests, or procedures, referring and communicating with other health care professionals , documenting information in the medical record, independently interpreting results and communicating that information with the patient/family/caregiver, and care coordination.    Follow Up  No follow-ups on file.    Patient or patient representative verbalized consent for the use of Ambient Listening during the visit with  Sean Fernandez MD for chart documentation. 7/15/2025  18:38 EDT    Please note that portions of this note may have been completed with a voice recognition program.     Sean Fernandez MD, MPH  [facility] [place] [facility] [place]

## 2025-08-29 ENCOUNTER — OFFICE VISIT (OUTPATIENT)
Dept: BEHAVIORAL HEALTH | Facility: CLINIC | Age: 35
End: 2025-08-29
Payer: MEDICAID

## 2025-08-29 DIAGNOSIS — F43.9 TRAUMA AND STRESSOR-RELATED DISORDER: ICD-10-CM

## 2025-08-29 DIAGNOSIS — F43.23 ADJUSTMENT DISORDER WITH MIXED ANXIETY AND DEPRESSED MOOD: Primary | ICD-10-CM
